# Patient Record
Sex: MALE | Race: WHITE | Employment: OTHER | URBAN - METROPOLITAN AREA
[De-identification: names, ages, dates, MRNs, and addresses within clinical notes are randomized per-mention and may not be internally consistent; named-entity substitution may affect disease eponyms.]

---

## 2017-02-14 RX ORDER — FLUOXETINE HYDROCHLORIDE 40 MG/1
CAPSULE ORAL
Qty: 90 CAPSULE | Refills: 0 | Status: SHIPPED | OUTPATIENT
Start: 2017-02-14 | End: 2017-10-31 | Stop reason: ALTCHOICE

## 2017-05-15 RX ORDER — LOSARTAN POTASSIUM AND HYDROCHLOROTHIAZIDE 12.5; 5 MG/1; MG/1
TABLET ORAL
Qty: 90 TABLET | Refills: 0 | Status: SHIPPED | OUTPATIENT
Start: 2017-05-15 | End: 2017-08-14 | Stop reason: SDUPTHER

## 2017-05-15 RX ORDER — BUPROPION HYDROCHLORIDE 300 MG/1
TABLET ORAL
Qty: 90 TABLET | Refills: 0 | Status: SHIPPED | OUTPATIENT
Start: 2017-05-15 | End: 2017-08-14 | Stop reason: SDUPTHER

## 2017-05-16 RX ORDER — FLUOXETINE HYDROCHLORIDE 40 MG/1
CAPSULE ORAL
Qty: 90 CAPSULE | Refills: 0 | Status: SHIPPED | OUTPATIENT
Start: 2017-05-16 | End: 2017-08-17 | Stop reason: SDUPTHER

## 2017-08-15 RX ORDER — LOSARTAN POTASSIUM AND HYDROCHLOROTHIAZIDE 12.5; 5 MG/1; MG/1
TABLET ORAL
Qty: 90 TABLET | Refills: 0 | Status: SHIPPED | OUTPATIENT
Start: 2017-08-15 | End: 2017-10-31 | Stop reason: SDUPTHER

## 2017-08-15 RX ORDER — BUPROPION HYDROCHLORIDE 300 MG/1
TABLET ORAL
Qty: 90 TABLET | Refills: 0 | Status: SHIPPED | OUTPATIENT
Start: 2017-08-15 | End: 2017-10-31 | Stop reason: ALTCHOICE

## 2017-08-17 RX ORDER — FLUOXETINE HYDROCHLORIDE 40 MG/1
CAPSULE ORAL
Qty: 90 CAPSULE | Refills: 0 | Status: SHIPPED | OUTPATIENT
Start: 2017-08-17 | End: 2017-10-31 | Stop reason: SDUPTHER

## 2017-10-26 ENCOUNTER — TELEPHONE (OUTPATIENT)
Dept: INTERNAL MEDICINE CLINIC | Age: 72
End: 2017-10-26

## 2017-10-26 DIAGNOSIS — Z12.5 PROSTATE CANCER SCREENING: ICD-10-CM

## 2017-10-26 DIAGNOSIS — E78.5 HYPERLIPIDEMIA, UNSPECIFIED HYPERLIPIDEMIA TYPE: ICD-10-CM

## 2017-10-26 DIAGNOSIS — I10 HYPERTENSION, UNSPECIFIED TYPE: Primary | ICD-10-CM

## 2017-10-26 NOTE — TELEPHONE ENCOUNTER
Patient called states that he has an appointment next week and would like to know if he needs blood work prior office visit.

## 2017-10-30 ENCOUNTER — TELEPHONE (OUTPATIENT)
Dept: INTERNAL MEDICINE CLINIC | Age: 72
End: 2017-10-30

## 2017-10-31 ENCOUNTER — OFFICE VISIT (OUTPATIENT)
Dept: INTERNAL MEDICINE CLINIC | Age: 72
End: 2017-10-31

## 2017-10-31 VITALS
RESPIRATION RATE: 16 BRPM | BODY MASS INDEX: 26.86 KG/M2 | SYSTOLIC BLOOD PRESSURE: 132 MMHG | HEART RATE: 80 BPM | DIASTOLIC BLOOD PRESSURE: 80 MMHG | WEIGHT: 203.6 LBS

## 2017-10-31 DIAGNOSIS — I10 ESSENTIAL HYPERTENSION: Primary | ICD-10-CM

## 2017-10-31 DIAGNOSIS — Z23 NEED FOR INFLUENZA VACCINATION: ICD-10-CM

## 2017-10-31 DIAGNOSIS — Z13.6 SCREENING FOR AAA (ABDOMINAL AORTIC ANEURYSM): ICD-10-CM

## 2017-10-31 DIAGNOSIS — F39 MOOD DISORDER (HCC): ICD-10-CM

## 2017-10-31 PROCEDURE — 1036F TOBACCO NON-USER: CPT | Performed by: INTERNAL MEDICINE

## 2017-10-31 PROCEDURE — 90662 IIV NO PRSV INCREASED AG IM: CPT | Performed by: INTERNAL MEDICINE

## 2017-10-31 PROCEDURE — G0008 ADMIN INFLUENZA VIRUS VAC: HCPCS | Performed by: INTERNAL MEDICINE

## 2017-10-31 PROCEDURE — G8482 FLU IMMUNIZE ORDER/ADMIN: HCPCS | Performed by: INTERNAL MEDICINE

## 2017-10-31 PROCEDURE — G8419 CALC BMI OUT NRM PARAM NOF/U: HCPCS | Performed by: INTERNAL MEDICINE

## 2017-10-31 PROCEDURE — 3017F COLORECTAL CA SCREEN DOC REV: CPT | Performed by: INTERNAL MEDICINE

## 2017-10-31 PROCEDURE — 99213 OFFICE O/P EST LOW 20 MIN: CPT | Performed by: INTERNAL MEDICINE

## 2017-10-31 PROCEDURE — 1123F ACP DISCUSS/DSCN MKR DOCD: CPT | Performed by: INTERNAL MEDICINE

## 2017-10-31 PROCEDURE — G8427 DOCREV CUR MEDS BY ELIG CLIN: HCPCS | Performed by: INTERNAL MEDICINE

## 2017-10-31 PROCEDURE — 4040F PNEUMOC VAC/ADMIN/RCVD: CPT | Performed by: INTERNAL MEDICINE

## 2017-10-31 RX ORDER — FLUOXETINE HYDROCHLORIDE 20 MG/1
20 CAPSULE ORAL DAILY
Qty: 50 CAPSULE | Refills: 3 | Status: SHIPPED | OUTPATIENT
Start: 2017-10-31 | End: 2017-10-31 | Stop reason: SDUPTHER

## 2017-10-31 RX ORDER — FLUOXETINE HYDROCHLORIDE 20 MG/1
20 CAPSULE ORAL DAILY
Qty: 90 CAPSULE | Refills: 3 | Status: SHIPPED | OUTPATIENT
Start: 2017-10-31 | End: 2018-03-06 | Stop reason: ALTCHOICE

## 2017-10-31 RX ORDER — DULOXETIN HYDROCHLORIDE 30 MG/1
30 CAPSULE, DELAYED RELEASE ORAL DAILY
Qty: 10 CAPSULE | Refills: 1 | Status: SHIPPED | OUTPATIENT
Start: 2017-10-31 | End: 2017-10-31 | Stop reason: SDUPTHER

## 2017-10-31 RX ORDER — DULOXETIN HYDROCHLORIDE 30 MG/1
30 CAPSULE, DELAYED RELEASE ORAL DAILY
Qty: 90 CAPSULE | Refills: 1 | Status: SHIPPED | OUTPATIENT
Start: 2017-10-31 | End: 2018-03-06 | Stop reason: ALTCHOICE

## 2017-10-31 RX ORDER — LOSARTAN POTASSIUM AND HYDROCHLOROTHIAZIDE 12.5; 5 MG/1; MG/1
TABLET ORAL
Qty: 90 TABLET | Refills: 1 | Status: SHIPPED | OUTPATIENT
Start: 2017-10-31 | End: 2018-06-11 | Stop reason: SDUPTHER

## 2017-10-31 RX ORDER — DULOXETIN HYDROCHLORIDE 60 MG/1
60 CAPSULE, DELAYED RELEASE ORAL DAILY
Qty: 90 CAPSULE | Refills: 1 | Status: SHIPPED | OUTPATIENT
Start: 2017-10-31 | End: 2018-03-06 | Stop reason: ALTCHOICE

## 2017-10-31 NOTE — PROGRESS NOTES
Sleep 90 tablet 0     No current facility-administered medications for this visit. Past Medical History:   Diagnosis Date    Adenomatous polyp of colon 1/2001    Mildly Dysplastic Tubular Adenoma    Basal cell carcinoma of cheek 12/2012    left temple; Moh's; [facility]    Basal cell carcinoma of skin of trunk 11/2010    front of neck; Kay Garduno & Juan Snow    Hyperlipidemia 2015    lipids are OK; but [place] 12%    Hypertension 10/2014    Melanoma in situ of scalp ([address] 12/2012    Vertex scalp; Van level III    Mood disorder ([facility]     Irritabilitiy    Ocular histoplasmosis syndrome of both eyes 2000    left eye greater than right eye-S/P laser treatment in [email or web address]; Avastin ; Dr Danna Grant Sleep disorder     chronic        Social History   Substance Use Topics    Smoking status: Former Smoker     Packs/day: 1.50     Years: 11.00     Start date: 7/15/1964     Quit date: 1/5/1975    Smokeless tobacco: Never Used    Alcohol use Yes      Comment: wine daily        ROS: The patient has had no headache, sore throat, fever or chills, cough, dyspnea, chest pain, nausea, vomiting or diarrhea, or edema. Objective:      /80   Pulse 80   Resp 16   Wt 203 lb 9.6 oz (92.4 kg)   BMI 26.86 kg/m²    General: in no apparent distress   The patient's neck is free of nodes. Lungs are clear. Heart is normal in rate and regular in rhythm. Legs are free of edema. No rash or erythema. labs from Oct on chart and rev'd    Assessment / Plan:      1. Essential hypertension    2.  Mood disorder ([facility] [address] [facility]    3. Screening for AAA (abdominal aortic aneurysm)            Plan   Get US of AAA       Wellbutrin 150 daily x 30 days then every other day x 10 doses    Cut prozac to 20 mg daily x 7 days, then stop    After stopping prozac completely begin cymbalta 30 mg /d x 10 d, then increase to 60 mg /d    He is leaving soon for winter in [place]  Will return in Spring

## 2017-10-31 NOTE — PATIENT INSTRUCTIONS
Wellbutrin 150 daily x 30 days then every other day x 10 doses    Cut prozac to 20 mg daily x 7 days, then stop    After stopping prozac completely begin cymbalta 30 mg /d x 10 d, then increase to 60 mg /d

## 2017-11-03 DIAGNOSIS — Z13.6 SCREENING FOR AAA (ABDOMINAL AORTIC ANEURYSM): ICD-10-CM

## 2017-12-04 RX ORDER — ZOLPIDEM TARTRATE 10 MG/1
10 TABLET ORAL NIGHTLY PRN
Qty: 90 TABLET | Refills: 0 | Status: SHIPPED | OUTPATIENT
Start: 2017-12-04 | End: 2018-09-12 | Stop reason: SDUPTHER

## 2017-12-06 DIAGNOSIS — Z12.5 PROSTATE CANCER SCREENING: ICD-10-CM

## 2018-03-06 ENCOUNTER — TELEPHONE (OUTPATIENT)
Dept: INTERNAL MEDICINE CLINIC | Age: 73
End: 2018-03-06

## 2018-03-06 RX ORDER — CITALOPRAM 20 MG/1
20 TABLET ORAL DAILY
Qty: 30 TABLET | Refills: 2 | Status: SHIPPED | OUTPATIENT
Start: 2018-03-06 | End: 2018-12-05

## 2018-03-06 RX ORDER — BUPROPION HYDROCHLORIDE 150 MG/1
150 TABLET ORAL EVERY MORNING
COMMUNITY
End: 2018-03-06 | Stop reason: SDUPTHER

## 2018-03-06 RX ORDER — CITALOPRAM 20 MG/1
20 TABLET ORAL DAILY
COMMUNITY
End: 2018-03-06 | Stop reason: SDUPTHER

## 2018-03-06 RX ORDER — BUPROPION HYDROCHLORIDE 150 MG/1
150 TABLET ORAL EVERY MORNING
Qty: 30 TABLET | Refills: 2 | Status: SHIPPED | OUTPATIENT
Start: 2018-03-06 | End: 2018-06-04 | Stop reason: SDUPTHER

## 2018-03-06 NOTE — TELEPHONE ENCOUNTER
Encounter Messages     Read Composed From To Subject   Y 3/6/2018  3:42 PM Safia Quiroz RN RE:Medication refills   Y 3/6/2018  3:41 PM Safia Quiroz, RN RE:Medication refills   Y 3/6/2018  3:37 PM Safia Quiroz, RN RE:Medication refills   Y 3/6/2018  3:31 PM SANJUANITA Leal Medication refills      Medication refills     Lissa Gomez 8 minutes ago (3:42 PM)         Part 3     I dont really want the go back on the [place] combination but hopefully you may have another suggestion. Best regards,   Alyse Coppola   [phone]   Email: [email or web address]     [id number]          Lissa Sissy   Jason 9 minutes ago (3:41 PM)               Part 2   2 There have been so many different looking capsules that it was not noticed by me.  Looked about the same.  I should have checked but didnt. I took the Cloretta [place] 150 for a little over a month until I caught the error.  I then stepped up the Cloretta Foxhome  2 capsules a day for 5 days and then restarted the DULOXETINE 60 MG   I have been back on it for about two weeks but just dont like the results.  When I was on it through the first 90 days, I saw some positive results.  My memory was much improved.  I think I took that as an improvement and didnt give enough attention to the other issues.  As I look back, I think some of the issues existing now were present then. I          Lissa Robertsonsssofy Gomez 12 minutes ago (3:37 PM)         Part 1     Valdo Garnica,   This is Zonia Singer am in [place] until the first week in April. I am sending this message to give detail to my situation to shorten and take less time when you respond. I am having some difficulty with the new anti-depressant.  I am experiencing swings and character change issues which are very concerning.    After taking it for the first prescription period, when I refilled it, either [facility] or I made a mistake and the refilled 50 of the 150 mg of WELLBUTRIN rather than the current dosage of DULOXETINE 60 MG.  I did not like the way I felt during that month.    There have been so many different looking capsules that it was not noticed by me

## 2018-06-01 ENCOUNTER — TELEPHONE (OUTPATIENT)
Dept: INTERNAL MEDICINE CLINIC | Age: 73
End: 2018-06-01

## 2018-06-05 RX ORDER — DULOXETIN HYDROCHLORIDE 30 MG/1
30 CAPSULE, DELAYED RELEASE ORAL DAILY
Qty: 90 CAPSULE | Refills: 1 | Status: SHIPPED | OUTPATIENT
Start: 2018-06-05 | End: 2018-12-05

## 2018-06-05 RX ORDER — BUPROPION HYDROCHLORIDE 150 MG/1
150 TABLET ORAL EVERY MORNING
Qty: 30 TABLET | Refills: 0 | Status: SHIPPED | OUTPATIENT
Start: 2018-06-05 | End: 2018-06-12 | Stop reason: SDUPTHER

## 2018-06-11 RX ORDER — LOSARTAN POTASSIUM AND HYDROCHLOROTHIAZIDE 12.5; 5 MG/1; MG/1
TABLET ORAL
Qty: 90 TABLET | Refills: 1 | Status: SHIPPED | OUTPATIENT
Start: 2018-06-11 | End: 2018-12-05 | Stop reason: SDUPTHER

## 2018-06-12 ENCOUNTER — TELEPHONE (OUTPATIENT)
Dept: INTERNAL MEDICINE CLINIC | Age: 73
End: 2018-06-12

## 2018-06-13 RX ORDER — BUPROPION HYDROCHLORIDE 150 MG/1
150 TABLET ORAL EVERY MORNING
Qty: 90 TABLET | Refills: 0 | Status: SHIPPED | OUTPATIENT
Start: 2018-06-13 | End: 2018-09-14 | Stop reason: SDUPTHER

## 2018-07-09 ENCOUNTER — TELEPHONE (OUTPATIENT)
Dept: INTERNAL MEDICINE CLINIC | Age: 73
End: 2018-07-09

## 2018-09-12 ENCOUNTER — TELEPHONE (OUTPATIENT)
Dept: INTERNAL MEDICINE CLINIC | Age: 73
End: 2018-09-12

## 2018-09-12 DIAGNOSIS — F51.01 PRIMARY INSOMNIA: Primary | ICD-10-CM

## 2018-09-12 RX ORDER — ZOLPIDEM TARTRATE 10 MG/1
TABLET ORAL
Qty: 90 TABLET | Refills: 0 | Status: SHIPPED | OUTPATIENT
Start: 2018-09-12 | End: 2018-12-05 | Stop reason: SDUPTHER

## 2018-09-14 RX ORDER — BUPROPION HYDROCHLORIDE 150 MG/1
150 TABLET ORAL EVERY MORNING
Qty: 90 TABLET | Refills: 0 | Status: SHIPPED | OUTPATIENT
Start: 2018-09-14 | End: 2018-12-05 | Stop reason: SDUPTHER

## 2018-11-29 ENCOUNTER — TELEPHONE (OUTPATIENT)
Dept: INTERNAL MEDICINE CLINIC | Age: 73
End: 2018-11-29

## 2018-11-29 DIAGNOSIS — I10 HYPERTENSION, UNSPECIFIED TYPE: Primary | ICD-10-CM

## 2018-11-29 DIAGNOSIS — E78.5 HYPERLIPIDEMIA, UNSPECIFIED HYPERLIPIDEMIA TYPE: ICD-10-CM

## 2018-11-29 DIAGNOSIS — Z12.5 PROSTATE CANCER SCREENING: ICD-10-CM

## 2018-11-29 NOTE — TELEPHONE ENCOUNTER
Yes:    CBC with diff  CMP  Lipid profile  PSA screening    DX htn , hyperlipidema , prostate cancer screening

## 2018-12-04 ENCOUNTER — HOSPITAL ENCOUNTER (OUTPATIENT)
Age: 73
Discharge: HOME | End: 2018-12-04
Payer: MEDICARE

## 2018-12-04 LAB
ALBUMIN SERPL-MCNC: 4.1 GM/DL (ref 3.4–5)
ALP BLD-CCNC: 59 IU/L (ref 40–128)
ALT SERPL-CCNC: 15 U/L (ref 10–40)
ANION GAP SERPL CALCULATED.3IONS-SCNC: 8 MMOL/L (ref 4–16)
AST SERPL-CCNC: 21 IU/L (ref 15–37)
BASOPHILS ABSOLUTE: 0.1 K/CU MM
BASOPHILS RELATIVE PERCENT: 1.1 % (ref 0–1)
BILIRUB SERPL-MCNC: 0.5 MG/DL (ref 0–1)
BUN BLDV-MCNC: 18 MG/DL (ref 6–23)
CALCIUM SERPL-MCNC: 9.2 MG/DL (ref 8.3–10.6)
CHLORIDE BLD-SCNC: 97 MMOL/L (ref 99–110)
CHOLESTEROL: 144 MG/DL
CO2: 28 MMOL/L (ref 21–32)
CREAT SERPL-MCNC: 1 MG/DL (ref 0.9–1.3)
DIFFERENTIAL TYPE: ABNORMAL
EOSINOPHILS ABSOLUTE: 0.3 K/CU MM
EOSINOPHILS RELATIVE PERCENT: 5.6 % (ref 0–3)
GFR AFRICAN AMERICAN: >60 ML/MIN/1.73M2
GFR NON-AFRICAN AMERICAN: >60 ML/MIN/1.73M2
GLUCOSE BLD-MCNC: 105 MG/DL (ref 70–99)
HCT VFR BLD CALC: 45.6 % (ref 42–52)
HDLC SERPL-MCNC: 73 MG/DL
HEMOGLOBIN: 14.6 GM/DL (ref 13.5–18)
IMMATURE NEUTROPHIL %: 0.4 % (ref 0–0.43)
LDL CHOLESTEROL DIRECT: 74 MG/DL
LYMPHOCYTES ABSOLUTE: 1.2 K/CU MM
LYMPHOCYTES RELATIVE PERCENT: 22.2 % (ref 24–44)
MCH RBC QN AUTO: 31.7 PG (ref 27–31)
MCHC RBC AUTO-ENTMCNC: 32 % (ref 32–36)
MCV RBC AUTO: 99.1 FL (ref 78–100)
MONOCYTES ABSOLUTE: 0.9 K/CU MM
MONOCYTES RELATIVE PERCENT: 16.8 % (ref 0–4)
NUCLEATED RBC %: 0 %
PDW BLD-RTO: 12.3 % (ref 11.7–14.9)
PLATELET # BLD: 183 K/CU MM (ref 140–440)
PMV BLD AUTO: 11.3 FL (ref 7.5–11.1)
POTASSIUM SERPL-SCNC: 5 MMOL/L (ref 3.5–5.1)
PROSTATE SPECIFIC ANTIGEN: 1.71 NG/ML (ref 0–4)
RBC # BLD: 4.6 M/CU MM (ref 4.6–6.2)
SEGMENTED NEUTROPHILS ABSOLUTE COUNT: 3 K/CU MM
SEGMENTED NEUTROPHILS RELATIVE PERCENT: 53.9 % (ref 36–66)
SODIUM BLD-SCNC: 133 MMOL/L (ref 135–145)
TOTAL IMMATURE NEUTOROPHIL: 0.02 K/CU MM
TOTAL NUCLEATED RBC: 0 K/CU MM
TOTAL PROTEIN: 6.4 GM/DL (ref 6.4–8.2)
TRIGL SERPL-MCNC: 34 MG/DL
WBC # BLD: 5.6 K/CU MM (ref 4–10.5)

## 2018-12-04 PROCEDURE — 80053 COMPREHEN METABOLIC PANEL: CPT

## 2018-12-04 PROCEDURE — 36415 COLL VENOUS BLD VENIPUNCTURE: CPT

## 2018-12-04 PROCEDURE — 80061 LIPID PANEL: CPT

## 2018-12-04 PROCEDURE — 85025 COMPLETE CBC W/AUTO DIFF WBC: CPT

## 2018-12-04 PROCEDURE — 83721 ASSAY OF BLOOD LIPOPROTEIN: CPT

## 2018-12-04 PROCEDURE — G0103 PSA SCREENING: HCPCS

## 2018-12-05 ENCOUNTER — ANESTHESIA EVENT (OUTPATIENT)
Dept: OPERATING ROOM | Age: 73
End: 2018-12-05
Payer: MEDICARE

## 2018-12-05 ENCOUNTER — OFFICE VISIT (OUTPATIENT)
Dept: INTERNAL MEDICINE CLINIC | Age: 73
End: 2018-12-05
Payer: MEDICARE

## 2018-12-05 VITALS
OXYGEN SATURATION: 98 % | SYSTOLIC BLOOD PRESSURE: 128 MMHG | WEIGHT: 206 LBS | RESPIRATION RATE: 16 BRPM | HEIGHT: 73 IN | HEART RATE: 93 BPM | BODY MASS INDEX: 27.3 KG/M2 | DIASTOLIC BLOOD PRESSURE: 80 MMHG

## 2018-12-05 DIAGNOSIS — H32 OCULAR HISTOPLASMOSIS SYNDROME OF BOTH EYES: ICD-10-CM

## 2018-12-05 DIAGNOSIS — I10 HYPERTENSION, UNSPECIFIED TYPE: Primary | ICD-10-CM

## 2018-12-05 DIAGNOSIS — F39 MOOD DISORDER (HCC): ICD-10-CM

## 2018-12-05 DIAGNOSIS — B39.9 OCULAR HISTOPLASMOSIS SYNDROME OF BOTH EYES: ICD-10-CM

## 2018-12-05 DIAGNOSIS — F51.01 PRIMARY INSOMNIA: ICD-10-CM

## 2018-12-05 PROCEDURE — 1101F PT FALLS ASSESS-DOCD LE1/YR: CPT | Performed by: INTERNAL MEDICINE

## 2018-12-05 PROCEDURE — 4040F PNEUMOC VAC/ADMIN/RCVD: CPT | Performed by: INTERNAL MEDICINE

## 2018-12-05 PROCEDURE — G8510 SCR DEP NEG, NO PLAN REQD: HCPCS | Performed by: INTERNAL MEDICINE

## 2018-12-05 PROCEDURE — 3017F COLORECTAL CA SCREEN DOC REV: CPT | Performed by: INTERNAL MEDICINE

## 2018-12-05 PROCEDURE — G8419 CALC BMI OUT NRM PARAM NOF/U: HCPCS | Performed by: INTERNAL MEDICINE

## 2018-12-05 PROCEDURE — 1123F ACP DISCUSS/DSCN MKR DOCD: CPT | Performed by: INTERNAL MEDICINE

## 2018-12-05 PROCEDURE — G8427 DOCREV CUR MEDS BY ELIG CLIN: HCPCS | Performed by: INTERNAL MEDICINE

## 2018-12-05 PROCEDURE — G8482 FLU IMMUNIZE ORDER/ADMIN: HCPCS | Performed by: INTERNAL MEDICINE

## 2018-12-05 PROCEDURE — 99214 OFFICE O/P EST MOD 30 MIN: CPT | Performed by: INTERNAL MEDICINE

## 2018-12-05 PROCEDURE — 1036F TOBACCO NON-USER: CPT | Performed by: INTERNAL MEDICINE

## 2018-12-05 RX ORDER — DULOXETIN HYDROCHLORIDE 30 MG/1
30 CAPSULE, DELAYED RELEASE ORAL DAILY
COMMUNITY
End: 2018-12-05 | Stop reason: SDUPTHER

## 2018-12-05 RX ORDER — DULOXETIN HYDROCHLORIDE 30 MG/1
30 CAPSULE, DELAYED RELEASE ORAL DAILY
Qty: 90 CAPSULE | Refills: 1 | Status: SHIPPED | OUTPATIENT
Start: 2018-12-05 | End: 2019-06-18 | Stop reason: SDUPTHER

## 2018-12-05 RX ORDER — BUPROPION HYDROCHLORIDE 300 MG/1
300 TABLET ORAL EVERY MORNING
Qty: 90 TABLET | Refills: 1 | Status: SHIPPED | OUTPATIENT
Start: 2018-12-05 | End: 2019-09-12 | Stop reason: SDUPTHER

## 2018-12-05 RX ORDER — ZOLPIDEM TARTRATE 10 MG/1
10 TABLET ORAL NIGHTLY PRN
Qty: 90 TABLET | Refills: 0 | Status: SHIPPED | OUTPATIENT
Start: 2018-12-05 | End: 2018-12-23 | Stop reason: SDUPTHER

## 2018-12-05 RX ORDER — BUPROPION HYDROCHLORIDE 300 MG/1
300 TABLET ORAL EVERY MORNING
COMMUNITY
End: 2018-12-05 | Stop reason: SDUPTHER

## 2018-12-05 RX ORDER — LOSARTAN POTASSIUM AND HYDROCHLOROTHIAZIDE 12.5; 5 MG/1; MG/1
TABLET ORAL
Qty: 90 TABLET | Refills: 1 | Status: SHIPPED | OUTPATIENT
Start: 2018-12-05 | End: 2019-06-18 | Stop reason: SDUPTHER

## 2018-12-05 ASSESSMENT — PATIENT HEALTH QUESTIONNAIRE - PHQ9
1. LITTLE INTEREST OR PLEASURE IN DOING THINGS: 0
SUM OF ALL RESPONSES TO PHQ9 QUESTIONS 1 & 2: 0
2. FEELING DOWN, DEPRESSED OR HOPELESS: 0
SUM OF ALL RESPONSES TO PHQ QUESTIONS 1-9: 0
SUM OF ALL RESPONSES TO PHQ QUESTIONS 1-9: 0

## 2018-12-05 NOTE — ANESTHESIA PRE PROCEDURE
Department of Anesthesiology  Preprocedure Note       Name:  Cathie Vasquez   Age:  75 y.o.  :  1945                                          MRN:  [id number]         Date:  2018      Surgeon: Jose L Ching):  Girma Gonzalez MD    Procedure: COLONOSCOPY DIAGNOSTIC OR SCREENING (N/A )    Medications prior to admission:   Prior to Admission medications    Medication Sig Start Date End Date Taking? Authorizing Provider   buPROPion (WELLBUTRIN XL) 150 MG extended release tablet Take 1 tablet by mouth every morning 18   Lashay De La Torre MD   zolpidem (AMBIEN) 10 MG tablet TAKE 1 TABLET BY MOUTH EVERY DAY AT BEDTIME FOR SLEEP 18  Lashay De La Torre MD   linaclotide [facility]) 145 MCG capsule Take 1 capsule by mouth every morning (before breakfast) 18   Lashay De La Torre MD   losartan-hydrochlorothiazide [facility]) 50-12.5 MG per tablet TAKE 1 TABLET BY MOUTH EVERY DAY 18   Lashay De La Torre MD   DULoxetine (CYMBALTA) 30 MG extended release capsule TAKE 1 CAPSULE BY MOUTH DAILY 18   Lashay De La Torre MD   citalopram (CELEXA) 20 MG tablet Take 1 tablet by mouth daily 3/6/18   Lashay De La Torre MD   tadalafil (CIALIS) 20 MG tablet Take 1 tablet by mouth as needed for Erectile Dysfunction 10/18/16   Lashay De La Torre MD       Current medications:    No current facility-administered medications for this encounter.       Current Outpatient Prescriptions   Medication Sig Dispense Refill    buPROPion (WELLBUTRIN XL) 150 MG extended release tablet Take 1 tablet by mouth every morning 90 tablet 0    zolpidem (AMBIEN) 10 MG tablet TAKE 1 TABLET BY MOUTH EVERY DAY AT BEDTIME FOR SLEEP 90 tablet 0    linaclotide (LINZESS) 145 MCG capsule Take 1 capsule by mouth every morning (before breakfast) 30 capsule 5    losartan-hydrochlorothiazide (HYZAAR) 50-12.5 MG per tablet TAKE 1 TABLET BY MOUTH EVERY DAY 90 tablet 1    DULoxetine (CYMBALTA) 30 MG extended release capsule TAKE 1 CAPSULE BY MOUTH DAILY 90

## 2018-12-06 ENCOUNTER — TELEPHONE (OUTPATIENT)
Dept: INTERNAL MEDICINE CLINIC | Age: 73
End: 2018-12-06

## 2018-12-06 RX ORDER — SILDENAFIL CITRATE 20 MG/1
20 TABLET ORAL PRN
Qty: 10 TABLET | Refills: 5 | Status: ON HOLD | OUTPATIENT
Start: 2018-12-06 | End: 2018-12-07 | Stop reason: ALTCHOICE

## 2018-12-06 RX ORDER — SILDENAFIL CITRATE 20 MG/1
20 TABLET ORAL PRN
COMMUNITY
End: 2018-12-06 | Stop reason: SDUPTHER

## 2018-12-07 ENCOUNTER — HOSPITAL ENCOUNTER (OUTPATIENT)
Age: 73
Setting detail: OUTPATIENT SURGERY
Discharge: HOME | End: 2018-12-07
Attending: SPECIALIST | Admitting: SPECIALIST
Payer: MEDICARE

## 2018-12-07 ENCOUNTER — ANESTHESIA (OUTPATIENT)
Dept: OPERATING ROOM | Age: 73
End: 2018-12-07
Payer: MEDICARE

## 2018-12-07 VITALS
SYSTOLIC BLOOD PRESSURE: 121 MMHG | TEMPERATURE: 97 F | BODY MASS INDEX: 27.3 KG/M2 | RESPIRATION RATE: 16 BRPM | DIASTOLIC BLOOD PRESSURE: 63 MMHG | WEIGHT: 206 LBS | OXYGEN SATURATION: 97 % | HEIGHT: 73 IN | HEART RATE: 51 BPM

## 2018-12-07 VITALS — SYSTOLIC BLOOD PRESSURE: 93 MMHG | OXYGEN SATURATION: 99 % | DIASTOLIC BLOOD PRESSURE: 54 MMHG

## 2018-12-07 PROCEDURE — 3609010600 HC COLONOSCOPY POLYPECTOMY SNARE/COLD BIOPSY: Performed by: SPECIALIST

## 2018-12-07 PROCEDURE — 3700000001 HC ADD 15 MINUTES (ANESTHESIA): Performed by: SPECIALIST

## 2018-12-07 PROCEDURE — 3700000000 HC ANESTHESIA ATTENDED CARE: Performed by: SPECIALIST

## 2018-12-07 PROCEDURE — 7100000010 HC PHASE II RECOVERY - FIRST 15 MIN: Performed by: SPECIALIST

## 2018-12-07 PROCEDURE — 88305 TISSUE EXAM BY PATHOLOGIST: CPT

## 2018-12-07 PROCEDURE — 2580000003 HC RX 258: Performed by: SPECIALIST

## 2018-12-07 PROCEDURE — 2500000003 HC RX 250 WO HCPCS: Performed by: NURSE ANESTHETIST, CERTIFIED REGISTERED

## 2018-12-07 PROCEDURE — 7100000011 HC PHASE II RECOVERY - ADDTL 15 MIN: Performed by: SPECIALIST

## 2018-12-07 PROCEDURE — 6360000002 HC RX W HCPCS: Performed by: NURSE ANESTHETIST, CERTIFIED REGISTERED

## 2018-12-07 PROCEDURE — 2709999900 HC NON-CHARGEABLE SUPPLY: Performed by: SPECIALIST

## 2018-12-07 RX ORDER — LIDOCAINE HYDROCHLORIDE 20 MG/ML
INJECTION, SOLUTION INFILTRATION; PERINEURAL PRN
Status: DISCONTINUED | OUTPATIENT
Start: 2018-12-07 | End: 2018-12-07 | Stop reason: SDUPTHER

## 2018-12-07 RX ORDER — PROPOFOL 10 MG/ML
INJECTION, EMULSION INTRAVENOUS PRN
Status: DISCONTINUED | OUTPATIENT
Start: 2018-12-07 | End: 2018-12-07 | Stop reason: SDUPTHER

## 2018-12-07 RX ORDER — SODIUM CHLORIDE, SODIUM LACTATE, POTASSIUM CHLORIDE, CALCIUM CHLORIDE 600; 310; 30; 20 MG/100ML; MG/100ML; MG/100ML; MG/100ML
INJECTION, SOLUTION INTRAVENOUS CONTINUOUS
Status: DISCONTINUED | OUTPATIENT
Start: 2018-12-07 | End: 2018-12-07 | Stop reason: HOSPADM

## 2018-12-07 RX ADMIN — PROPOFOL 30 MG: 10 INJECTION, EMULSION INTRAVENOUS at 12:17

## 2018-12-07 RX ADMIN — LIDOCAINE HYDROCHLORIDE 100 MG: 20 INJECTION, SOLUTION INFILTRATION; PERINEURAL at 12:11

## 2018-12-07 RX ADMIN — PROPOFOL 50 MG: 10 INJECTION, EMULSION INTRAVENOUS at 12:11

## 2018-12-07 RX ADMIN — PROPOFOL 30 MG: 10 INJECTION, EMULSION INTRAVENOUS at 12:34

## 2018-12-07 RX ADMIN — PROPOFOL 30 MG: 10 INJECTION, EMULSION INTRAVENOUS at 12:38

## 2018-12-07 RX ADMIN — PROPOFOL 30 MG: 10 INJECTION, EMULSION INTRAVENOUS at 12:20

## 2018-12-07 RX ADMIN — PROPOFOL 20 MG: 10 INJECTION, EMULSION INTRAVENOUS at 12:28

## 2018-12-07 RX ADMIN — PROPOFOL 20 MG: 10 INJECTION, EMULSION INTRAVENOUS at 12:46

## 2018-12-07 RX ADMIN — SODIUM CHLORIDE, POTASSIUM CHLORIDE, SODIUM LACTATE AND CALCIUM CHLORIDE: 600; 310; 30; 20 INJECTION, SOLUTION INTRAVENOUS at 10:57

## 2018-12-07 RX ADMIN — PROPOFOL 30 MG: 10 INJECTION, EMULSION INTRAVENOUS at 12:23

## 2018-12-07 RX ADMIN — PROPOFOL 50 MG: 10 INJECTION, EMULSION INTRAVENOUS at 12:12

## 2018-12-07 RX ADMIN — PROPOFOL 30 MG: 10 INJECTION, EMULSION INTRAVENOUS at 12:15

## 2018-12-07 RX ADMIN — PROPOFOL 30 MG: 10 INJECTION, EMULSION INTRAVENOUS at 12:43

## 2018-12-07 RX ADMIN — PROPOFOL 30 MG: 10 INJECTION, EMULSION INTRAVENOUS at 12:40

## 2018-12-07 RX ADMIN — PROPOFOL 20 MG: 10 INJECTION, EMULSION INTRAVENOUS at 12:31

## 2018-12-07 ASSESSMENT — PAIN SCALES - GENERAL
PAINLEVEL_OUTOF10: 0
PAINLEVEL_OUTOF10: 0

## 2018-12-07 ASSESSMENT — PAIN - FUNCTIONAL ASSESSMENT: PAIN_FUNCTIONAL_ASSESSMENT: 0-10

## 2018-12-23 DIAGNOSIS — F51.01 PRIMARY INSOMNIA: ICD-10-CM

## 2018-12-26 RX ORDER — ZOLPIDEM TARTRATE 10 MG/1
TABLET ORAL
Qty: 90 TABLET | Refills: 0 | Status: SHIPPED | OUTPATIENT
Start: 2018-12-26 | End: 2019-03-26

## 2019-03-01 ENCOUNTER — PATIENT MESSAGE (OUTPATIENT)
Dept: INTERNAL MEDICINE CLINIC | Age: 74
End: 2019-03-01

## 2019-03-01 RX ORDER — AZITHROMYCIN 250 MG/1
250 TABLET, FILM COATED ORAL SEE ADMIN INSTRUCTIONS
Qty: 6 TABLET | Refills: 0 | Status: SHIPPED | OUTPATIENT
Start: 2019-03-01 | End: 2019-03-06

## 2019-06-18 RX ORDER — LOSARTAN POTASSIUM AND HYDROCHLOROTHIAZIDE 12.5; 5 MG/1; MG/1
TABLET ORAL
Qty: 90 TABLET | Refills: 0 | Status: SHIPPED | OUTPATIENT
Start: 2019-06-18 | End: 2019-09-12 | Stop reason: SDUPTHER

## 2019-06-18 RX ORDER — DULOXETIN HYDROCHLORIDE 30 MG/1
CAPSULE, DELAYED RELEASE ORAL
Qty: 90 CAPSULE | Refills: 0 | Status: SHIPPED | OUTPATIENT
Start: 2019-06-18 | End: 2019-09-12 | Stop reason: SDUPTHER

## 2019-06-20 RX ORDER — BUPROPION HYDROCHLORIDE 300 MG/1
TABLET ORAL
Qty: 90 TABLET | Refills: 0 | Status: SHIPPED | OUTPATIENT
Start: 2019-06-20 | End: 2019-10-07

## 2019-09-15 RX ORDER — BUPROPION HYDROCHLORIDE 300 MG/1
300 TABLET ORAL EVERY MORNING
Qty: 90 TABLET | Refills: 1 | Status: SHIPPED | OUTPATIENT
Start: 2019-09-15 | End: 2019-12-14 | Stop reason: SDUPTHER

## 2019-09-15 RX ORDER — LOSARTAN POTASSIUM AND HYDROCHLOROTHIAZIDE 12.5; 5 MG/1; MG/1
TABLET ORAL
Qty: 90 TABLET | Refills: 0 | Status: SHIPPED | OUTPATIENT
Start: 2019-09-15 | End: 2019-12-13 | Stop reason: SDUPTHER

## 2019-09-15 RX ORDER — DULOXETIN HYDROCHLORIDE 30 MG/1
CAPSULE, DELAYED RELEASE ORAL
Qty: 90 CAPSULE | Refills: 0 | Status: SHIPPED | OUTPATIENT
Start: 2019-09-15 | End: 2019-10-07

## 2019-09-15 RX ORDER — DULOXETIN HYDROCHLORIDE 30 MG/1
30 CAPSULE, DELAYED RELEASE ORAL DAILY
Qty: 90 CAPSULE | Refills: 0 | Status: SHIPPED | OUTPATIENT
Start: 2019-09-15 | End: 2020-06-01

## 2019-10-07 ENCOUNTER — OFFICE VISIT (OUTPATIENT)
Dept: INTERNAL MEDICINE CLINIC | Age: 74
End: 2019-10-07
Payer: MEDICARE

## 2019-10-07 VITALS
WEIGHT: 206 LBS | HEART RATE: 76 BPM | DIASTOLIC BLOOD PRESSURE: 74 MMHG | SYSTOLIC BLOOD PRESSURE: 132 MMHG | OXYGEN SATURATION: 95 % | BODY MASS INDEX: 27.18 KG/M2

## 2019-10-07 DIAGNOSIS — I10 ESSENTIAL HYPERTENSION: Primary | ICD-10-CM

## 2019-10-07 DIAGNOSIS — F39 MOOD DISORDER (HCC): ICD-10-CM

## 2019-10-07 DIAGNOSIS — E78.2 MIXED HYPERLIPIDEMIA: ICD-10-CM

## 2019-10-07 DIAGNOSIS — F48.9 MOOD PROBLEM: ICD-10-CM

## 2019-10-07 DIAGNOSIS — Z12.5 PROSTATE CANCER SCREENING: ICD-10-CM

## 2019-10-07 PROCEDURE — G8427 DOCREV CUR MEDS BY ELIG CLIN: HCPCS | Performed by: INTERNAL MEDICINE

## 2019-10-07 PROCEDURE — 99213 OFFICE O/P EST LOW 20 MIN: CPT | Performed by: INTERNAL MEDICINE

## 2019-10-07 PROCEDURE — 1036F TOBACCO NON-USER: CPT | Performed by: INTERNAL MEDICINE

## 2019-10-07 PROCEDURE — G8419 CALC BMI OUT NRM PARAM NOF/U: HCPCS | Performed by: INTERNAL MEDICINE

## 2019-10-07 PROCEDURE — 1123F ACP DISCUSS/DSCN MKR DOCD: CPT | Performed by: INTERNAL MEDICINE

## 2019-10-07 PROCEDURE — 3017F COLORECTAL CA SCREEN DOC REV: CPT | Performed by: INTERNAL MEDICINE

## 2019-10-07 PROCEDURE — G8484 FLU IMMUNIZE NO ADMIN: HCPCS | Performed by: INTERNAL MEDICINE

## 2019-10-07 PROCEDURE — 4040F PNEUMOC VAC/ADMIN/RCVD: CPT | Performed by: INTERNAL MEDICINE

## 2019-10-07 ASSESSMENT — PATIENT HEALTH QUESTIONNAIRE - PHQ9
SUM OF ALL RESPONSES TO PHQ9 QUESTIONS 1 & 2: 0
2. FEELING DOWN, DEPRESSED OR HOPELESS: 0
1. LITTLE INTEREST OR PLEASURE IN DOING THINGS: 0
SUM OF ALL RESPONSES TO PHQ QUESTIONS 1-9: 0
SUM OF ALL RESPONSES TO PHQ QUESTIONS 1-9: 0

## 2019-12-13 RX ORDER — LOSARTAN POTASSIUM AND HYDROCHLOROTHIAZIDE 12.5; 5 MG/1; MG/1
TABLET ORAL
Qty: 90 TABLET | Refills: 0 | Status: SHIPPED | OUTPATIENT
Start: 2019-12-13 | End: 2020-03-16

## 2019-12-16 RX ORDER — BUPROPION HYDROCHLORIDE 300 MG/1
300 TABLET ORAL EVERY MORNING
Qty: 90 TABLET | Refills: 0 | Status: SHIPPED | OUTPATIENT
Start: 2019-12-16 | End: 2020-06-01

## 2019-12-16 RX ORDER — DULOXETIN HYDROCHLORIDE 30 MG/1
CAPSULE, DELAYED RELEASE ORAL
Qty: 90 CAPSULE | Refills: 0 | Status: SHIPPED | OUTPATIENT
Start: 2019-12-16 | End: 2020-06-01

## 2019-12-17 ENCOUNTER — HOSPITAL ENCOUNTER (OUTPATIENT)
Age: 74
Discharge: HOME | End: 2019-12-17
Payer: MEDICARE

## 2019-12-17 LAB
ALBUMIN SERPL-MCNC: 4.4 GM/DL (ref 3.4–5)
ALP BLD-CCNC: 61 IU/L (ref 40–128)
ALT SERPL-CCNC: 13 U/L (ref 10–40)
ANION GAP SERPL CALCULATED.3IONS-SCNC: 10 MMOL/L (ref 4–16)
AST SERPL-CCNC: 21 IU/L (ref 15–37)
BACTERIA: ABNORMAL /HPF
BASOPHILS ABSOLUTE: 0 K/CU MM
BASOPHILS RELATIVE PERCENT: 0.8 % (ref 0–1)
BILIRUB SERPL-MCNC: 0.6 MG/DL (ref 0–1)
BILIRUBIN URINE: NEGATIVE MG/DL
BLOOD, URINE: NEGATIVE
BUN BLDV-MCNC: 18 MG/DL (ref 6–23)
CALCIUM SERPL-MCNC: 9.2 MG/DL (ref 8.3–10.6)
CHLORIDE BLD-SCNC: 98 MMOL/L (ref 99–110)
CHOLESTEROL: 142 MG/DL
CLARITY: CLEAR
CO2: 28 MMOL/L (ref 21–32)
COLOR: YELLOW
CREAT SERPL-MCNC: 1.1 MG/DL (ref 0.9–1.3)
DIFFERENTIAL TYPE: ABNORMAL
EOSINOPHILS ABSOLUTE: 0.3 K/CU MM
EOSINOPHILS RELATIVE PERCENT: 5.2 % (ref 0–3)
GFR AFRICAN AMERICAN: >60 ML/MIN/1.73M2
GFR NON-AFRICAN AMERICAN: >60 ML/MIN/1.73M2
GLUCOSE BLD-MCNC: 105 MG/DL (ref 70–99)
GLUCOSE, URINE: NEGATIVE MG/DL
HCT VFR BLD CALC: 43.4 % (ref 42–52)
HDLC SERPL-MCNC: 70 MG/DL
HEMOGLOBIN: 14.3 GM/DL (ref 13.5–18)
IMMATURE NEUTROPHIL %: 0.4 % (ref 0–0.43)
KETONES, URINE: NEGATIVE MG/DL
LDL CHOLESTEROL DIRECT: 73 MG/DL
LEUKOCYTE ESTERASE, URINE: NEGATIVE
LYMPHOCYTES ABSOLUTE: 1.2 K/CU MM
LYMPHOCYTES RELATIVE PERCENT: 22.2 % (ref 24–44)
MCH RBC QN AUTO: 32.1 PG (ref 27–31)
MCHC RBC AUTO-ENTMCNC: 32.9 % (ref 32–36)
MCV RBC AUTO: 97.3 FL (ref 78–100)
MONOCYTES ABSOLUTE: 0.9 K/CU MM
MONOCYTES RELATIVE PERCENT: 16.4 % (ref 0–4)
MUCUS: ABNORMAL HPF
NITRITE URINE, QUANTITATIVE: NEGATIVE
NUCLEATED RBC %: 0 %
PDW BLD-RTO: 12.3 % (ref 11.7–14.9)
PH, URINE: 7 (ref 5–8)
PLATELET # BLD: 194 K/CU MM (ref 140–440)
PMV BLD AUTO: 11.5 FL (ref 7.5–11.1)
POTASSIUM SERPL-SCNC: 5 MMOL/L (ref 3.5–5.1)
PROSTATE SPECIFIC ANTIGEN: 2.05 NG/ML (ref 0–4)
PROTEIN UA: NEGATIVE MG/DL
RBC # BLD: 4.46 M/CU MM (ref 4.6–6.2)
RBC URINE: <1 /HPF (ref 0–3)
SEGMENTED NEUTROPHILS ABSOLUTE COUNT: 2.8 K/CU MM
SEGMENTED NEUTROPHILS RELATIVE PERCENT: 55 % (ref 36–66)
SODIUM BLD-SCNC: 136 MMOL/L (ref 135–145)
SPECIFIC GRAVITY UA: 1.02 (ref 1–1.03)
TOTAL IMMATURE NEUTOROPHIL: 0.02 K/CU MM
TOTAL NUCLEATED RBC: 0 K/CU MM
TOTAL PROTEIN: 6.5 GM/DL (ref 6.4–8.2)
TRICHOMONAS: ABNORMAL /HPF
TRIGL SERPL-MCNC: 38 MG/DL
UROBILINOGEN, URINE: NORMAL MG/DL (ref 0.2–1)
WBC # BLD: 5.2 K/CU MM (ref 4–10.5)
WBC UA: 1 /HPF (ref 0–2)

## 2019-12-17 PROCEDURE — 85025 COMPLETE CBC W/AUTO DIFF WBC: CPT

## 2019-12-17 PROCEDURE — 80061 LIPID PANEL: CPT

## 2019-12-17 PROCEDURE — 80053 COMPREHEN METABOLIC PANEL: CPT

## 2019-12-17 PROCEDURE — G0103 PSA SCREENING: HCPCS

## 2019-12-17 PROCEDURE — 83721 ASSAY OF BLOOD LIPOPROTEIN: CPT

## 2019-12-17 PROCEDURE — 81001 URINALYSIS AUTO W/SCOPE: CPT

## 2019-12-17 PROCEDURE — 36415 COLL VENOUS BLD VENIPUNCTURE: CPT

## 2019-12-18 ENCOUNTER — PATIENT MESSAGE (OUTPATIENT)
Dept: INTERNAL MEDICINE CLINIC | Age: 74
End: 2019-12-18

## 2019-12-18 DIAGNOSIS — F51.01 PRIMARY INSOMNIA: Primary | ICD-10-CM

## 2019-12-23 RX ORDER — ZOLPIDEM TARTRATE 10 MG/1
10 TABLET ORAL NIGHTLY PRN
Qty: 90 TABLET | Refills: 0 | Status: SHIPPED | OUTPATIENT
Start: 2019-12-23 | End: 2020-01-06

## 2020-03-16 RX ORDER — LOSARTAN POTASSIUM AND HYDROCHLOROTHIAZIDE 12.5; 5 MG/1; MG/1
TABLET ORAL
Qty: 90 TABLET | Refills: 0 | Status: SHIPPED | OUTPATIENT
Start: 2020-03-16 | End: 2020-03-23

## 2020-03-23 RX ORDER — HYDROCHLOROTHIAZIDE 12.5 MG/1
12.5 CAPSULE, GELATIN COATED ORAL DAILY
Qty: 90 CAPSULE | Refills: 1 | Status: SHIPPED | OUTPATIENT
Start: 2020-03-23 | End: 2020-07-28

## 2020-03-23 RX ORDER — LOSARTAN POTASSIUM 50 MG/1
50 TABLET ORAL DAILY
Qty: 90 TABLET | Refills: 1 | Status: SHIPPED | OUTPATIENT
Start: 2020-03-23 | End: 2020-03-24

## 2020-03-23 RX ORDER — HYDROCHLOROTHIAZIDE 12.5 MG/1
12.5 CAPSULE, GELATIN COATED ORAL EVERY MORNING
Qty: 90 CAPSULE | Refills: 1 | Status: CANCELLED | OUTPATIENT
Start: 2020-03-23

## 2020-03-23 RX ORDER — LOSARTAN POTASSIUM 50 MG/1
50 TABLET ORAL DAILY
Qty: 90 TABLET | Refills: 1 | Status: CANCELLED | OUTPATIENT
Start: 2020-03-23

## 2020-03-23 RX ORDER — LOSARTAN POTASSIUM 50 MG/1
50 TABLET ORAL DAILY
Qty: 30 TABLET | Refills: 5 | Status: SHIPPED | OUTPATIENT
Start: 2020-03-23 | End: 2020-03-23

## 2020-03-23 RX ORDER — LOSARTAN POTASSIUM AND HYDROCHLOROTHIAZIDE 12.5; 5 MG/1; MG/1
TABLET ORAL
Qty: 90 TABLET | Refills: 0 | Status: SHIPPED | OUTPATIENT
Start: 2020-03-23 | End: 2020-03-23 | Stop reason: CLARIF

## 2020-03-23 RX ORDER — HYDROCHLOROTHIAZIDE 12.5 MG/1
12.5 CAPSULE, GELATIN COATED ORAL DAILY
Qty: 30 CAPSULE | Refills: 5 | Status: SHIPPED | OUTPATIENT
Start: 2020-03-23 | End: 2020-03-23

## 2020-03-24 RX ORDER — LOSARTAN POTASSIUM 50 MG/1
50 TABLET ORAL DAILY
Qty: 90 TABLET | Refills: 1 | Status: SHIPPED | OUTPATIENT
Start: 2020-03-24 | End: 2020-07-28

## 2020-03-24 RX ORDER — LOSARTAN POTASSIUM 50 MG/1
50 TABLET ORAL DAILY
Qty: 90 TABLET | Refills: 1 | Status: SHIPPED | OUTPATIENT
Start: 2020-03-24 | End: 2020-07-28 | Stop reason: ALTCHOICE

## 2020-06-01 RX ORDER — BUPROPION HYDROCHLORIDE 300 MG/1
300 TABLET ORAL EVERY MORNING
Qty: 90 TABLET | Refills: 0 | Status: SHIPPED | OUTPATIENT
Start: 2020-06-01 | End: 2020-09-02

## 2020-06-01 RX ORDER — DULOXETIN HYDROCHLORIDE 30 MG/1
CAPSULE, DELAYED RELEASE ORAL
Qty: 90 CAPSULE | Refills: 0 | Status: SHIPPED | OUTPATIENT
Start: 2020-06-01 | End: 2020-09-02

## 2020-06-01 RX ORDER — LOSARTAN POTASSIUM AND HYDROCHLOROTHIAZIDE 12.5; 5 MG/1; MG/1
TABLET ORAL
Qty: 90 TABLET | Refills: 0 | Status: SHIPPED | OUTPATIENT
Start: 2020-06-01 | End: 2020-09-02

## 2020-06-08 RX ORDER — ZOLPIDEM TARTRATE 10 MG/1
TABLET ORAL
Qty: 90 TABLET | Refills: 0 | Status: SHIPPED | OUTPATIENT
Start: 2020-06-08 | End: 2020-09-06

## 2020-07-28 ENCOUNTER — OFFICE VISIT (OUTPATIENT)
Dept: INTERNAL MEDICINE CLINIC | Age: 75
End: 2020-07-28
Payer: MEDICARE

## 2020-07-28 VITALS
HEART RATE: 76 BPM | BODY MASS INDEX: 27.5 KG/M2 | OXYGEN SATURATION: 98 % | HEIGHT: 72 IN | SYSTOLIC BLOOD PRESSURE: 120 MMHG | DIASTOLIC BLOOD PRESSURE: 78 MMHG | WEIGHT: 203 LBS | TEMPERATURE: 97.3 F

## 2020-07-28 PROCEDURE — 99214 OFFICE O/P EST MOD 30 MIN: CPT | Performed by: FAMILY MEDICINE

## 2020-07-28 PROCEDURE — 4040F PNEUMOC VAC/ADMIN/RCVD: CPT | Performed by: FAMILY MEDICINE

## 2020-07-28 PROCEDURE — 1123F ACP DISCUSS/DSCN MKR DOCD: CPT | Performed by: FAMILY MEDICINE

## 2020-07-28 PROCEDURE — 1036F TOBACCO NON-USER: CPT | Performed by: FAMILY MEDICINE

## 2020-07-28 PROCEDURE — G8417 CALC BMI ABV UP PARAM F/U: HCPCS | Performed by: FAMILY MEDICINE

## 2020-07-28 PROCEDURE — G8427 DOCREV CUR MEDS BY ELIG CLIN: HCPCS | Performed by: FAMILY MEDICINE

## 2020-07-28 ASSESSMENT — ENCOUNTER SYMPTOMS
DIARRHEA: 0
SORE THROAT: 0
ABDOMINAL PAIN: 0
VOMITING: 0
CHEST TIGHTNESS: 0
CONSTIPATION: 0
COLOR CHANGE: 0
SHORTNESS OF BREATH: 0

## 2020-07-28 ASSESSMENT — PATIENT HEALTH QUESTIONNAIRE - PHQ9
2. FEELING DOWN, DEPRESSED OR HOPELESS: 0
SUM OF ALL RESPONSES TO PHQ QUESTIONS 1-9: 0
SUM OF ALL RESPONSES TO PHQ QUESTIONS 1-9: 0
SUM OF ALL RESPONSES TO PHQ9 QUESTIONS 1 & 2: 0
1. LITTLE INTEREST OR PLEASURE IN DOING THINGS: 0

## 2020-07-28 NOTE — PROGRESS NOTES
Years: 11.00     Pack years: 16.50     Start date: 7/15/1964     Last attempt to quit: 1975     Years since quittin.7    Smokeless tobacco: Never Used   Substance Use Topics    Alcohol use: Yes     Comment: wine daily        Review of Systems   Constitutional: Negative for activity change, appetite change, chills, fever and unexpected weight change. HENT: Negative for congestion and sore throat. Respiratory: Negative for chest tightness and shortness of breath. Cardiovascular: Negative for chest pain and palpitations. Gastrointestinal: Negative for abdominal pain, constipation, diarrhea and vomiting. Genitourinary: Negative for dysuria. Skin: Negative for color change. Neurological: Negative for dizziness and light-headedness. Psychiatric/Behavioral: Negative for dysphoric mood. The patient is not nervous/anxious. Prior to Visit Medications    Medication Sig Taking? Authorizing Provider   zolpidem (AMBIEN) 10 MG tablet TAKE 1 TABLET BY MOUTH NIGHTLY AS NEEDED FOR SLEEP Yes Eun Villafana MD   buPROPion (WELLBUTRIN XL) 300 MG extended release tablet TAKE 1 TABLET BY MOUTH EVERY MORNING Yes Eun Villafana MD   losartan-hydroCHLOROthiazide (HYZAAR) 50-12.5 MG per tablet TAKE 1 TABLET BY MOUTH EVERY DAY Yes Eun Villafana MD   DULoxetine (CYMBALTA) 30 MG extended release capsule TAKE 1 CAPSULE BY MOUTH DAILY AS DIRECTED Yes Eun Villafana MD   linaclotide (LINZESS) 145 MCG capsule Take 1 capsule by mouth every morning (before breakfast) Yes Simeon Toney MD          Objective:      /78   Pulse 76   Temp 97.3 °F (36.3 °C)   Ht 5' 11.75\" (1.822 m)   Wt 203 lb (92.1 kg)   SpO2 98%   BMI 27.72 kg/m²      Physical Exam  Vitals signs and nursing note reviewed. Constitutional:       General: He is not in acute distress. Appearance: Normal appearance. He is not ill-appearing or toxic-appearing. HENT:      Head: Normocephalic and atraumatic. Right Ear: External ear normal.      Left Ear: External ear normal.      Nose: Nose normal.      Mouth/Throat:      Pharynx: Oropharynx is clear. Eyes:      General: No scleral icterus. Right eye: No discharge. Left eye: No discharge. Extraocular Movements: Extraocular movements intact. Conjunctiva/sclera: Conjunctivae normal.   Neck:      Musculoskeletal: Normal range of motion and neck supple. No neck rigidity. Cardiovascular:      Rate and Rhythm: Normal rate and regular rhythm. Heart sounds: Normal heart sounds. Pulmonary:      Effort: Pulmonary effort is normal.      Breath sounds: Normal breath sounds. No wheezing or rales. Musculoskeletal:         General: No deformity. Skin:     General: Skin is warm and dry. Findings: No rash. Neurological:      General: No focal deficit present. Mental Status: He is alert. Mental status is at baseline. Motor: No weakness. Psychiatric:         Mood and Affect: Mood normal.         Behavior: Behavior normal.            Assessment / Plan:      1. Essential hypertension  Stable, well controlled. Continue current meds. - CBC Auto Differential; Future  - Comprehensive Metabolic Panel; Future    2. Mood disorder ([address]  Well controlled with cymbalta and wellbutrin. Can continue Ambien for sleep as needed. 3. Hyperlipidemia, unspecified hyperlipidemia type  Currently diet controlled. Due for labs- will discuss statin at follow up per results. - Lipid Panel; Future          Patient voiced understanding and agreement with plan. All questions/concerns were addressed, risks/side effects of medications were reviewed. Return precautions and after visit summary were provided. Return in about 6 months (around 1/28/2021). or earlier as needed.       Sarah Alvarado MD

## 2020-09-03 RX ORDER — LOSARTAN POTASSIUM AND HYDROCHLOROTHIAZIDE 12.5; 5 MG/1; MG/1
TABLET ORAL
Qty: 90 TABLET | Refills: 1 | Status: SHIPPED | OUTPATIENT
Start: 2020-09-03

## 2020-09-03 RX ORDER — BUPROPION HYDROCHLORIDE 300 MG/1
300 TABLET ORAL EVERY MORNING
Qty: 90 TABLET | Refills: 1 | Status: SHIPPED | OUTPATIENT
Start: 2020-09-03 | End: 2021-06-04

## 2020-09-03 RX ORDER — DULOXETIN HYDROCHLORIDE 30 MG/1
CAPSULE, DELAYED RELEASE ORAL
Qty: 90 CAPSULE | Refills: 1
Start: 2020-09-03 | End: 2020-09-14

## 2020-09-11 NOTE — TELEPHONE ENCOUNTER
Duloxetine did not go through E- prescribe  With other medications called in prescription to pharmacy

## 2020-09-14 RX ORDER — DULOXETIN HYDROCHLORIDE 30 MG/1
CAPSULE, DELAYED RELEASE ORAL
Qty: 90 CAPSULE | Refills: 1 | Status: SHIPPED | OUTPATIENT
Start: 2020-09-14

## 2021-06-04 RX ORDER — VENLAFAXINE HYDROCHLORIDE 150 MG/1
CAPSULE, EXTENDED RELEASE ORAL
COMMUNITY
Start: 2021-05-28

## 2021-06-04 RX ORDER — BUPROPION HYDROCHLORIDE 150 MG/1
TABLET ORAL
COMMUNITY
Start: 2021-05-28

## 2021-06-17 DIAGNOSIS — Z86.010 HX OF COLONIC POLYP: Primary | ICD-10-CM

## 2021-06-17 DIAGNOSIS — Z86.010 HISTORY OF COLONIC POLYPS: ICD-10-CM

## 2021-07-01 NOTE — PROGRESS NOTES
Surgery 07/08/21 @ 0845 arrive @ 0730               1. Do not eat or drink anything after midnight - unless instructed by your doctor prior to surgery. This includes                   no water, chewing gum or mints. 2. Follow your directions as prescribed by the doctor for your procedure and medications. 3. Check with your Doctor regarding stopping Plavix, Coumadin, Lovenox,Effient,Pradaxa,Xarelto, Fragmin or other blood thinners and                   follow their instructions. 4. Do not smoke, and do not drink any alcoholic beverages 24 hours prior to surgery. This includes NA Beer. 5. You may brush your teeth and gargle the morning of surgery. DO NOT SWALLOW WATER   6. You MUST make arrangements for a responsible adult to take you home after your surgery and be able to check on you every couple                   hours for the day. You will not be allowed to leave alone or drive yourself home. It is strongly suggested someone stay with you the first 24                   hrs. Your surgery will be cancelled if you do not have a ride home. 7. Please wear simple, loose fitting clothing to the hospital.  [place] not bring valuables (money, credit cards, checkbooks, etc.) Do not wear any                   makeup (including no eye makeup) or nail polish on your fingers or toes. 8. DO NOT wear any jewelry or piercings on day of surgery. All body piercing jewelry must be removed. 9. If you have dentures, they will be removed before going to the OR; we will provide you a container. If you wear contact lenses or glasses,                  they will be removed; please bring a case for them. 10. If you  have a Living Will and Durable Power of  for Healthcare, please bring in a copy. 11. Please bring picture ID,  insurance card, paperwork from the doctors office    (H & P, Consent, & card for implantable devices).            12. Take a shower the night before or morning of your procedure, do not apply any lotion, oil or powder. 13. Wear a mask covering your nose & mouth when entering the hospital. Have your covid-19 test performed within 10 days of your                  Surgery. Pt states he is fully vaccinated, will bring card day of procedure. Quarantine yourself after the test until after your surgery.

## 2021-07-06 ENCOUNTER — ANESTHESIA EVENT (OUTPATIENT)
Dept: OPERATING ROOM | Age: 76
End: 2021-07-06
Payer: MEDICARE

## 2021-07-06 NOTE — ANESTHESIA PRE PROCEDURE
Department of Anesthesiology  Preprocedure Note       Name:  Dara Browne   Age:  67 y.o.  :  1945                                          MRN:  [id number]         Date:  2021      Surgeon: Thomas Rosales):  Brice Awad MD    Procedure: Procedure(s):  COLONOSCOPY DIAGNOSTIC    Medications prior to admission:   Prior to Admission medications    Medication Sig Start Date End Date Taking? Authorizing Provider   losartan-hydroCHLOROthiazide (HYZAAR) 50-12.5 MG per tablet TAKE 1 TABLET BY MOUTH EVERY DAY 9/3/20  Yes Madhuri Morgan MD   buPROPion (WELLBUTRIN XL) 150 MG extended release tablet  21   Historical Provider, MD   venlafaxine (EFFEXOR XR) 150 MG extended release capsule  21   Historical Provider, MD   DULoxetine (CYMBALTA) 30 MG extended release capsule TAKE 1 CAPSULE BY MOUTH DAILY AS DIRECTED 20   Madhuri Morgan MD   linaclotide [facility]) 145 MCG capsule Take 1 capsule by mouth every morning (before breakfast) 9/15/19   Marley Carlson MD       Current medications:    No current facility-administered medications for this encounter.      Current Outpatient Medications   Medication Sig Dispense Refill    losartan-hydroCHLOROthiazide (HYZAAR) 50-12.5 MG per tablet TAKE 1 TABLET BY MOUTH EVERY DAY 90 tablet 1    buPROPion (WELLBUTRIN XL) 150 MG extended release tablet       venlafaxine (EFFEXOR XR) 150 MG extended release capsule       DULoxetine (CYMBALTA) 30 MG extended release capsule TAKE 1 CAPSULE BY MOUTH DAILY AS DIRECTED 90 capsule 1    linaclotide (LINZESS) 145 MCG capsule Take 1 capsule by mouth every morning (before breakfast) 90 capsule 1       Allergies:  No Known Allergies    Problem List:    Patient Active Problem List   Diagnosis Code    Mood disorder (HCC) F39    Adenomatous polyp of colon D12.6    Ocular histoplasmosis syndrome of both eyes B39.9, H32    Hypertension I10    Medication management Z79.899    Hyperlipidemia E78.5    Sleep disorder G47.9       Past Medical History:        Diagnosis Date    Adenomatous polyp of colon 2001    Mildly Dysplastic Tubular Adenoma    Basal cell carcinoma of cheek 2012    left temple; Moh's; [facility]    Basal cell carcinoma of skin of trunk 2010    front of neck; Kay Garduno & Krystal Estrada    Hyperlipidemia     lipids are OK; but [place] 12%    Hypertension 10/2014    Melanoma in situ of scalp ([facility] 2012    Vertex scalp; Van level III    Mood disorder ([address]     Irritabilitiy    Ocular histoplasmosis syndrome of both eyes     left eye greater than right eye-S/P laser treatment in [facility] [place] [employer];  Avastin ; Dr Sam Peterson Sleep disorder     chronic       Past Surgical History:        Procedure Laterality Date    CARPAL TUNNEL RELEASE Right 2008    COLONOSCOPY      COLONOSCOPY      COLONOSCOPY  2018    polyps 4, call next week for pathology results    COLONOSCOPY N/A 2018    COLONOSCOPY POLYPECTOMY SNARE/COLD BIOPSY performed by Briana Marie MD at [facility] ARTHROSCOPY Right 2015    Dr Oly Bull      L4-L5    [address]  2012    left temple: [facility]; [address] [place] [address]  SKIN CANCER EXCISION  2012    Melanoma; scalp       Social History:    Social History     Tobacco Use    Smoking status: Former Smoker     Packs/day: 1.50     Years: 11.00     Pack years: 16.50     Start date: 7/15/1964     Quit date: 1975     Years since quittin.11    Smokeless tobacco: Never Used   Substance Use Topics    Alcohol use: Yes     Comment: wine daily                                Counseling given: Not Answered      Vital Signs (Current):   Vitals:    21 1125   Weight: 195 lb (88.5 kg)   Height: 6' (1.829 m)                                              BP Readings from Last 3 Encounters:   20 120/78   10/07/19 132/74   18 (!) 93/54       NPO Status: Blocker         Neuro/Psych:   (+) psychiatric history:            GI/Hepatic/Renal:   (+) bowel prep,           Endo/Other:    (+) malignancy/cancer. ROS comment: Basal cell carcinoma of skin of trunk Abdominal:             Vascular: negative vascular ROS. Other Findings:           Anesthesia Plan      MAC     ASA 2       Induction: intravenous. Anesthetic plan and risks discussed with patient. NABEEL Khan CRNA   7/6/2021      Pre Anesthesia Evaluation complete. Anesthesia plan, risks, benefits, alternatives, and personnel discussed with patient and/or legal guardian. Patient and/or legal guardian verbalized an understanding and agreed to proceed. Anesthesia plan discussed with care team members and agreed upon.   NABEEL Khan CRNA  7/8/2021  COVID vaccjessicarted

## 2021-07-08 ENCOUNTER — HOSPITAL ENCOUNTER (OUTPATIENT)
Age: 76
Setting detail: OUTPATIENT SURGERY
Discharge: HOME | End: 2021-07-08
Attending: SPECIALIST | Admitting: SPECIALIST
Payer: MEDICARE

## 2021-07-08 ENCOUNTER — ANESTHESIA (OUTPATIENT)
Dept: OPERATING ROOM | Age: 76
End: 2021-07-08
Payer: MEDICARE

## 2021-07-08 VITALS — SYSTOLIC BLOOD PRESSURE: 100 MMHG | OXYGEN SATURATION: 100 % | DIASTOLIC BLOOD PRESSURE: 63 MMHG

## 2021-07-08 VITALS
BODY MASS INDEX: 26.41 KG/M2 | OXYGEN SATURATION: 100 % | DIASTOLIC BLOOD PRESSURE: 77 MMHG | TEMPERATURE: 96.8 F | HEIGHT: 72 IN | SYSTOLIC BLOOD PRESSURE: 143 MMHG | HEART RATE: 60 BPM | WEIGHT: 195 LBS | RESPIRATION RATE: 16 BRPM

## 2021-07-08 PROCEDURE — 7100000010 HC PHASE II RECOVERY - FIRST 15 MIN: Performed by: SPECIALIST

## 2021-07-08 PROCEDURE — 3700000000 HC ANESTHESIA ATTENDED CARE: Performed by: SPECIALIST

## 2021-07-08 PROCEDURE — 45385 COLONOSCOPY W/LESION REMOVAL: CPT | Performed by: SPECIALIST

## 2021-07-08 PROCEDURE — 3700000001 HC ADD 15 MINUTES (ANESTHESIA): Performed by: SPECIALIST

## 2021-07-08 PROCEDURE — 88305 TISSUE EXAM BY PATHOLOGIST: CPT | Performed by: PATHOLOGY

## 2021-07-08 PROCEDURE — 2709999900 HC NON-CHARGEABLE SUPPLY: Performed by: SPECIALIST

## 2021-07-08 PROCEDURE — 6360000002 HC RX W HCPCS: Performed by: NURSE ANESTHETIST, CERTIFIED REGISTERED

## 2021-07-08 PROCEDURE — 2580000003 HC RX 258: Performed by: SPECIALIST

## 2021-07-08 PROCEDURE — 3609010600 HC COLONOSCOPY POLYPECTOMY SNARE/COLD BIOPSY: Performed by: SPECIALIST

## 2021-07-08 PROCEDURE — 7100000011 HC PHASE II RECOVERY - ADDTL 15 MIN: Performed by: SPECIALIST

## 2021-07-08 RX ORDER — LIDOCAINE HYDROCHLORIDE 20 MG/ML
INJECTION, SOLUTION INTRAVENOUS PRN
Status: DISCONTINUED | OUTPATIENT
Start: 2021-07-08 | End: 2021-07-08 | Stop reason: SDUPTHER

## 2021-07-08 RX ORDER — PHENYLEPHRINE HYDROCHLORIDE 10 MG/ML
INJECTION INTRAVENOUS PRN
Status: DISCONTINUED | OUTPATIENT
Start: 2021-07-08 | End: 2021-07-08 | Stop reason: SDUPTHER

## 2021-07-08 RX ORDER — PROPOFOL 10 MG/ML
INJECTION, EMULSION INTRAVENOUS PRN
Status: DISCONTINUED | OUTPATIENT
Start: 2021-07-08 | End: 2021-07-08 | Stop reason: SDUPTHER

## 2021-07-08 RX ORDER — SODIUM CHLORIDE, SODIUM LACTATE, POTASSIUM CHLORIDE, CALCIUM CHLORIDE 600; 310; 30; 20 MG/100ML; MG/100ML; MG/100ML; MG/100ML
INJECTION, SOLUTION INTRAVENOUS CONTINUOUS
Status: DISCONTINUED | OUTPATIENT
Start: 2021-07-08 | End: 2021-07-08 | Stop reason: HOSPADM

## 2021-07-08 RX ADMIN — PHENYLEPHRINE HYDROCHLORIDE 50 MCG: 10 INJECTION INTRAVENOUS at 09:20

## 2021-07-08 RX ADMIN — PHENYLEPHRINE HYDROCHLORIDE 50 MCG: 10 INJECTION INTRAVENOUS at 09:28

## 2021-07-08 RX ADMIN — SODIUM CHLORIDE, POTASSIUM CHLORIDE, SODIUM LACTATE AND CALCIUM CHLORIDE: 600; 310; 30; 20 INJECTION, SOLUTION INTRAVENOUS at 08:08

## 2021-07-08 RX ADMIN — PROPOFOL 300 MG: 10 INJECTION, EMULSION INTRAVENOUS at 08:56

## 2021-07-08 RX ADMIN — PHENYLEPHRINE HYDROCHLORIDE 50 MCG: 10 INJECTION INTRAVENOUS at 09:12

## 2021-07-08 RX ADMIN — PHENYLEPHRINE HYDROCHLORIDE 50 MCG: 10 INJECTION INTRAVENOUS at 09:14

## 2021-07-08 RX ADMIN — SODIUM CHLORIDE, POTASSIUM CHLORIDE, SODIUM LACTATE AND CALCIUM CHLORIDE: 600; 310; 30; 20 INJECTION, SOLUTION INTRAVENOUS at 09:28

## 2021-07-08 RX ADMIN — LIDOCAINE HYDROCHLORIDE 100 MG: 20 INJECTION, SOLUTION INTRAVENOUS at 08:56

## 2021-07-08 ASSESSMENT — PAIN SCALES - GENERAL
PAINLEVEL_OUTOF10: 0
PAINLEVEL_OUTOF10: 0

## 2021-07-08 ASSESSMENT — PAIN - FUNCTIONAL ASSESSMENT: PAIN_FUNCTIONAL_ASSESSMENT: 0-10

## 2021-07-08 NOTE — PROGRESS NOTES
0091 Pt received from [facility] and report received from [facility]. Pt lying on left side and denies c/o. Call light in reach. Wife to beside. Pt declined beverage offer. 21  Pt discharged to home to wife's vehicle.

## 2021-07-08 NOTE — ANESTHESIA POSTPROCEDURE EVALUATION
Department of Anesthesiology  Postprocedure Note    Patient: Oly Garcia  MRN: [id number]  YOB: 1945  Date of evaluation: 7/8/2021  Time:  9:39 AM     Procedure Summary     Date: 07/08/21 Room / Location: [address]    Anesthesia Start: 0377 Anesthesia Stop: 4673    Procedure: COLONOSCOPY POLYPECTOMY SNARE/COLD BIOPSY (N/A ) Diagnosis: (HX OF POLYPS)    Surgeons: Wilbert Reeves MD Responsible Provider: NABEEL Hay CRNA    Anesthesia Type: MAC ASA Status: 2          Anesthesia Type: MAC    Branden Phase I:  10    Branden Phase II:  10    Last vitals: Reviewed and per EMR flowsheets.        Anesthesia Post Evaluation    Patient location during evaluation: bedside  Patient participation: complete - patient participated  Level of consciousness: awake and alert  Pain score: 0  Airway patency: patent  Nausea & Vomiting: no nausea and no vomiting  Complications: no  Cardiovascular status: hemodynamically stable  Respiratory status: acceptable, room air, spontaneous ventilation and nonlabored ventilation  Hydration status: euvolemic

## 2021-07-08 NOTE — BRIEF OP NOTE
BRIEF COLONOSCOPY REPORT:   Photos and full colonoscopy report available by going to \"chart review\" then \"procedures\" then  \"colonoscopy\" then \"View Report\"      IMPRESSION :   1) 5 mm polyp removed from the hepatic flexure with the cold snare  2) two 6 mm polyps removed from the upper descending colon with the cold snare  3) small internal hemorrhoids  4) otherwise normal colon    PLAN : Colonoscopy in 3 years

## 2021-07-08 NOTE — H&P
Original H &P in soft chart. I have examined the patient immediately before the procedure and there is no change in the previous history and physical exam, which has been reviewed. There is no history of sleep apnea, snoring, or stridor. There has been no  previous adverse experience with sedation/anesthesia. There is no increased risk for aspiration of gastric contents. The patient has been instructed that all resuscitative measures (during the operative and immediate perioperative period) will be instituted in the unlikely event that they will be needed. The patient has no pertinent past surgical or family history other than listed in the original H&P. The patient was counseled about the risks of aris Covid-19 during their perioperative period and any recovery window from their procedure. The patient was made aware that aris Covid-19  may worsen their prognosis for recovering from their procedure  and lend to a higher morbidity and/or mortality risk. All material risks, benefits, and reasonable alternatives including postponing the procedure were discussed. The patient does wish to proceed with the procedure at this time.     ASA Class: 2  AIRWAY Class: 1

## 2021-09-03 ENCOUNTER — HOSPITAL ENCOUNTER (OUTPATIENT)
Dept: NUCLEAR MEDICINE | Age: 76
Discharge: HOME | End: 2021-09-03
Payer: MEDICARE

## 2021-09-03 DIAGNOSIS — C61 MALIGNANT NEOPLASM OF PROSTATE (HCC): ICD-10-CM

## 2021-09-03 PROCEDURE — 78306 BONE IMAGING WHOLE BODY: CPT

## 2021-09-03 PROCEDURE — 3430000000 HC RX DIAGNOSTIC RADIOPHARMACEUTICAL: Performed by: SPECIALIST

## 2021-09-03 PROCEDURE — A9503 TC99M MEDRONATE: HCPCS | Performed by: SPECIALIST

## 2021-09-03 RX ORDER — TC 99M MEDRONATE 20 MG/10ML
25 INJECTION, POWDER, LYOPHILIZED, FOR SOLUTION INTRAVENOUS
Status: COMPLETED | OUTPATIENT
Start: 2021-09-03 | End: 2021-09-03

## 2021-09-03 RX ADMIN — TC 99M MEDRONATE 25 MILLICURIE: 20 INJECTION, POWDER, LYOPHILIZED, FOR SOLUTION INTRAVENOUS at 10:00

## 2022-08-22 ENCOUNTER — TELEPHONE (OUTPATIENT)
Dept: CARDIOLOGY CLINIC | Age: 77
End: 2022-08-22

## 2022-08-22 NOTE — TELEPHONE ENCOUNTER
Spoke with patients wife per Lamar Anglin patient needs to see Dr Nona Jaimes this week. Patient has ppm and has been having syncopal episodes and possible TIAs. Keane Salt called [facility].

## 2022-08-23 ENCOUNTER — TELEPHONE (OUTPATIENT)
Dept: NEUROLOGY | Age: 77
End: 2022-08-23

## 2022-08-23 NOTE — TELEPHONE ENCOUNTER
[address] [facility]. at Cardiology reached out regarding a request for patient to be seen by neurology. I reviewed the record but was struggling to understand what neurological symptoms the patient was having. I contacted the wife and spoke with her over the phone regarding the patients condition. Patient had an episode in which he became weak, fell and wife felt like he appeared that he was losing consciousness. He recently was worked up and a pacemaker was placed. Wife feels like he is still having issues. She complains of weakness and personality changes. Dr. Deborah Lopes did order an MRI but it was postponed b.c he had the pacemaker. I reached out to him and asked that he place a referral for neurology and inquired on the MRI. MRI has been approve by insurance and sent to [facility]. I will call patient and update them that they should move forward with trying to get the MRI scheduled and reach back out once we receive the referral from the PCP.

## 2022-08-26 ENCOUNTER — INITIAL CONSULT (OUTPATIENT)
Dept: CARDIOLOGY CLINIC | Age: 77
End: 2022-08-26
Payer: MEDICARE

## 2022-08-26 VITALS
HEIGHT: 72 IN | HEART RATE: 78 BPM | DIASTOLIC BLOOD PRESSURE: 86 MMHG | SYSTOLIC BLOOD PRESSURE: 146 MMHG | BODY MASS INDEX: 30.61 KG/M2 | WEIGHT: 226 LBS | RESPIRATION RATE: 18 BRPM

## 2022-08-26 DIAGNOSIS — I63.9 CEREBROVASCULAR ACCIDENT (CVA), UNSPECIFIED MECHANISM (HCC): ICD-10-CM

## 2022-08-26 DIAGNOSIS — Z95.0 PACEMAKER: Primary | ICD-10-CM

## 2022-08-26 DIAGNOSIS — I10 HYPERTENSION, UNSPECIFIED TYPE: ICD-10-CM

## 2022-08-26 PROCEDURE — G8417 CALC BMI ABV UP PARAM F/U: HCPCS | Performed by: INTERNAL MEDICINE

## 2022-08-26 PROCEDURE — 1036F TOBACCO NON-USER: CPT | Performed by: INTERNAL MEDICINE

## 2022-08-26 PROCEDURE — 93280 PM DEVICE PROGR EVAL DUAL: CPT | Performed by: INTERNAL MEDICINE

## 2022-08-26 PROCEDURE — 1123F ACP DISCUSS/DSCN MKR DOCD: CPT | Performed by: INTERNAL MEDICINE

## 2022-08-26 PROCEDURE — G8427 DOCREV CUR MEDS BY ELIG CLIN: HCPCS | Performed by: INTERNAL MEDICINE

## 2022-08-26 PROCEDURE — 99204 OFFICE O/P NEW MOD 45 MIN: CPT | Performed by: INTERNAL MEDICINE

## 2022-08-26 RX ORDER — LOSARTAN POTASSIUM 50 MG/1
TABLET ORAL
COMMUNITY
Start: 2022-06-03

## 2022-08-26 RX ORDER — ESZOPICLONE 3 MG/1
TABLET, FILM COATED ORAL
COMMUNITY
Start: 2022-07-20

## 2022-08-26 RX ORDER — TIMOLOL MALEATE 5 MG/ML
SOLUTION/ DROPS OPHTHALMIC
COMMUNITY
Start: 2021-09-14

## 2022-08-26 RX ORDER — ZOLPIDEM TARTRATE 10 MG/1
10 TABLET ORAL DAILY PRN
COMMUNITY

## 2022-08-26 RX ORDER — FLUOXETINE HYDROCHLORIDE 20 MG/1
CAPSULE ORAL
COMMUNITY
Start: 2022-05-19

## 2022-08-26 NOTE — PROGRESS NOTES
Electrophysiology Consult Note      Reason for consultation: TIA / Pacemaker    Chief complaint : Left lip droop, fatigure    Referring physician: Tamara Strong      Primary care physician: Linda Hanks MD      History of Present Illness:     Patient is a 65 yr old male with hx of HTN, HLD, Pacemaker here to discuss about pacemaker and establish care here. Patient reportedly needed MRI for stroke evaluation but [facility] denied it because of his pacemaker. Patient had a pacemaker done in May at [facility] after questionable syncopal episode. Patient wife reports in March she had an episode where he was not responding for few seconds and then later he had another episode in May and when he went to the hospital they recommended either loop recorder or pacemaker and because of the low heart rate. Patient wife and daughter are concerned because he may have a risk of stroke because he is left side of the lip is pulling to the side which was not there before. Pastmedical history:   Past Medical History:   Diagnosis Date    Adenomatous polyp of colon 1/2001    Mildly Dysplastic Tubular Adenoma    Basal cell carcinoma of cheek 12/2012    left temple; Moh's; [facility]    Basal cell carcinoma of skin of trunk 11/2010    front of neck; [facility]    Hyperlipidemia 2015    lipids are OK; but [place] 12%    Hypertension 10/2014    Melanoma in situ of scalp ([address] 12/2012    Vertex scalp; [place] level III    Mood disorder ([address]     Irritabilitiy    Ocular histoplasmosis syndrome of both eyes 2000    left eye greater than right eye-S/P laser treatment in [place];  Avastin ; Dr Gibson [place]    Sleep disorder     chronic       Surgical history :   Past Surgical History:   Procedure Laterality Date    CARPAL TUNNEL RELEASE Right 07/02/2008    COLONOSCOPY  2015    COLONOSCOPY  2011    COLONOSCOPY  12/07/2018    polyps 4, call next week for pathology results COLONOSCOPY N/A 12/7/2018    COLONOSCOPY POLYPECTOMY SNARE/COLD BIOPSY performed by Alex Baron MD at [address] N/A 7/8/2021    COLONOSCOPY POLYPECTOMY SNARE/COLD BIOPSY performed by Alex Baron MD at [id number] [facility] ARTHROSCOPY Right 9/2015    Dr Cherri Arriaga      L4-L5    MOHS SURGERY  12/2012    left temple: [address]; [facility]    SKIN CANCER EXCISION  11/2012    Melanoma; scalp       Family history:   Family History   Problem Relation Age of Onset    Heart Disease Mother         d. age 68    Cancer Father         d. age 46 of lymphoma         Social history :  reports that he quit smoking about 47 years ago. He started smoking about 58 years ago. He has a 16.50 pack-year smoking history. He has never used smokeless tobacco. He reports current alcohol use. He reports that he does not use drugs. No Known Allergies    Current Outpatient Medications on File Prior to Visit   Medication Sig Dispense Refill    eszopiclone (LUNESTA) 3 MG TABS TAKE 1 TABLET BY MOUTH AT BEDTIME      losartan (COZAAR) 50 MG tablet       timolol (TIMOPTIC) 0.5 % ophthalmic solution INSTILL 1 DROP IN LEFT EYE DAILY IN THE MORNING      FLUoxetine (PROZAC) 20 MG capsule TAKE 1 CAPSULE BY MOUTH once daily IN THE EVENING      zolpidem (AMBIEN) 10 MG tablet Take 10 mg by mouth daily as needed.       buPROPion (WELLBUTRIN XL) 150 MG extended release tablet  (Patient not taking: Reported on 8/26/2022)      venlafaxine (EFFEXOR XR) 150 MG extended release capsule  (Patient not taking: Reported on 8/26/2022)      DULoxetine (CYMBALTA) 30 MG extended release capsule TAKE 1 CAPSULE BY MOUTH DAILY AS DIRECTED (Patient not taking: Reported on 8/26/2022) 90 capsule 1    losartan-hydroCHLOROthiazide (HYZAAR) 50-12.5 MG per tablet TAKE 1 TABLET BY MOUTH EVERY DAY (Patient not taking: Reported on 8/26/2022) 90 tablet 1    linaclotide (LINZESS) 145 MCG capsule Take 1 capsule by mouth every morning (before breakfast) (Patient not taking: Reported on 8/26/2022) 90 capsule 1     No current facility-administered medications on file prior to visit. Review of Systems:   Review of Systems   Constitutional:  Positive for fatigue. Negative for activity change, chills and fever. HENT:  Negative for congestion, ear pain and tinnitus. Eyes:  Negative for photophobia, pain and visual disturbance. Respiratory:  Negative for cough, chest tightness, shortness of breath and wheezing. Cardiovascular:  Negative for chest pain, palpitations and leg swelling. Gastrointestinal:  Negative for abdominal pain, blood in stool, constipation, diarrhea, nausea and vomiting. Endocrine: Negative for cold intolerance and heat intolerance. Genitourinary:  Negative for dysuria, flank pain and hematuria. Musculoskeletal:  Positive for arthralgias. Negative for back pain, myalgias and neck stiffness. Skin:  Negative for color change and rash. Allergic/Immunologic: Negative for food allergies. Neurological:  Negative for dizziness, light-headedness, numbness and headaches. Hematological:  Does not bruise/bleed easily. Psychiatric/Behavioral:  Negative for agitation, behavioral problems and confusion. Examination:      Vitals:    08/26/22 1053   BP: (!) 150/90   Site: Left Upper Arm   Position: Sitting   Cuff Size: Large Adult   Pulse: 78   Resp: 18   Weight: 226 lb (102.5 kg)   Height: 6' (1.829 m)        Body mass index is 30.65 kg/m². Physical Exam  Constitutional:       Appearance: Normal appearance. He is not ill-appearing. HENT:      Head: Normocephalic and atraumatic. Mouth/Throat:      Mouth: Mucous membranes are moist.   Eyes:      Conjunctiva/sclera: Conjunctivae normal.   Cardiovascular:      Rate and Rhythm: Normal rate and regular rhythm. Heart sounds: No murmur heard. Pulmonary:      Effort: Pulmonary effort is normal.      Breath sounds: No rales. Abdominal:      General: Abdomen is flat. Palpations: Abdomen is soft. Musculoskeletal:         General: No tenderness. Normal range of motion. Cervical back: Normal range of motion. Right lower leg: No edema. Left lower leg: No edema. Skin:     General: Skin is warm and dry. Neurological:      General: No focal deficit present. Mental Status: He is alert and oriented to person, place, and time. CBC:   Lab Results   Component Value Date/Time    WBC 5.2 12/17/2019 07:24 AM    HGB 14.3 12/17/2019 07:24 AM    HCT 43.4 12/17/2019 07:24 AM     12/17/2019 07:24 AM     Lipids:  Lab Results   Component Value Date    CHOL 142 12/17/2019    TRIG 38 12/17/2019    HDL 70 12/17/2019    LDLCALC 72 10/27/2017    LDLDIRECT 73 12/17/2019     PT/INR: No results found for: INR     BMP:    Lab Results   Component Value Date     12/17/2019    K 5.0 12/17/2019    CL 98 (L) 12/17/2019    CO2 28 12/17/2019    BUN 18 12/17/2019     CMP:   Lab Results   Component Value Date    AST 21 12/17/2019    PROT 6.5 12/17/2019    BILITOT 0.6 12/17/2019    ALKPHOS 61 12/17/2019     TSH:  No results found for: TSH, T4    EKGINTERPRETATION - EKG Interpretation:        Device Assessment:      The device is Medtronic pacemaker - Dual Chamber chamber      MRI Compatible : yes    Device interrogation was performed. Mode: AAIR --- DDDR     Sensing is normal. Impedence is normal.  Threshold is normal.     There has not been interval changes. Estimated battery life is 12.1 years     The underlying rhythm is AP, VS.  71.2 % atrial paced; 0.1 % ventricular paced. Atrial Arrhythmia : No    Non sustained VT episodes : No    Sustained VT episodes : No    Patient activity reported 2.5 hrs/day    The patient is mostly atrial pacemaker dependent. Interpreted by    Shanda Payan M.D             IMPRESSION / RECOMMENDATIONS:     Symptomatic bradycardia sp pacemaker  ? TIA  HTN  HLD    Patient had a pacemaker done in May at [facility] after questionable syncopal episode. Patient wife reports in March she had an episode where he was not responding for few seconds and then later he had another episode in May and when he went to the hospital they recommended either loop recorder or pacemaker and because of the low heart rate. Patient wife and daughter are concerned because he may have a risk of stroke because he is left side of the lip is pulling to the side which was not there before. Interestingly there has not seen a neurologist before. They were supposed to get an MRI which was ordered by Dr. Deepak Ragland but [facility] refused because he has a pacemaker    Not sure why it was refused because pacemaker is compatible for an MRI. Not sure if the rep was not there and that is the reason why  they did not want to do it. I discussed with them that they can have an MRI in [facility] if it is ordered. I also recommended to see the neurologist.      Patient is not on aspirin I discussed with starting aspirin and if it was concerning but they did not want to do that because he bruises easily until it is absolutely needed. Refer to neurologist for assessment of stroke  Okay to do MRI from my standpoint    Blood pressure was mildly elevated patient reports that his losartan was actually decreased because he was having low blood pressure now is at systolic of 393. I told the patient that if it is consistently about 150 we should increase his medication. Recommended patient to take blood pressure twice a day at home and if it is consistently above 140 to call us or the primary care to adjust his medication    On device no arrhythmia noted    Thanks again for allowing me to participate in care of this patient. Please call me if you have any questions. With best regards.       Raul Payan MD, 8/26/2022 11:03 AM     Please note this report has been partially produced using speech recognition software

## 2022-08-28 ENCOUNTER — APPOINTMENT (OUTPATIENT)
Dept: GENERAL RADIOLOGY | Age: 77
End: 2022-08-28
Payer: MEDICARE

## 2022-08-28 ENCOUNTER — HOSPITAL ENCOUNTER (EMERGENCY)
Age: 77
Discharge: ANOTHER HOSPITAL | End: 2022-08-28
Attending: EMERGENCY MEDICINE
Payer: MEDICARE

## 2022-08-28 ENCOUNTER — APPOINTMENT (OUTPATIENT)
Dept: CT IMAGING | Age: 77
End: 2022-08-28
Payer: MEDICARE

## 2022-08-28 VITALS
BODY MASS INDEX: 30.07 KG/M2 | OXYGEN SATURATION: 96 % | SYSTOLIC BLOOD PRESSURE: 152 MMHG | HEIGHT: 72 IN | WEIGHT: 222 LBS | RESPIRATION RATE: 19 BRPM | HEART RATE: 68 BPM | TEMPERATURE: 98.5 F | DIASTOLIC BLOOD PRESSURE: 93 MMHG

## 2022-08-28 DIAGNOSIS — R41.82 ALTERED MENTAL STATUS, UNSPECIFIED ALTERED MENTAL STATUS TYPE: Primary | ICD-10-CM

## 2022-08-28 DIAGNOSIS — G93.89 BRAIN MASS: ICD-10-CM

## 2022-08-28 LAB
ALBUMIN SERPL-MCNC: 4.4 GM/DL (ref 3.4–5)
ALP BLD-CCNC: 66 IU/L (ref 40–129)
ALT SERPL-CCNC: 14 U/L (ref 10–40)
ANION GAP SERPL CALCULATED.3IONS-SCNC: 9 MMOL/L (ref 4–16)
APTT: 24.5 SECONDS (ref 25.1–37.1)
AST SERPL-CCNC: 17 IU/L (ref 15–37)
BASOPHILS ABSOLUTE: 0 K/CU MM
BASOPHILS RELATIVE PERCENT: 0.5 % (ref 0–1)
BILIRUB SERPL-MCNC: 0.6 MG/DL (ref 0–1)
BUN BLDV-MCNC: 19 MG/DL (ref 6–23)
CALCIUM SERPL-MCNC: 9 MG/DL (ref 8.3–10.6)
CHLORIDE BLD-SCNC: 100 MMOL/L (ref 99–110)
CO2: 25 MMOL/L (ref 21–32)
CREAT SERPL-MCNC: 0.9 MG/DL (ref 0.9–1.3)
DIFFERENTIAL TYPE: ABNORMAL
EKG ATRIAL RATE: 61 BPM
EKG DIAGNOSIS: NORMAL
EKG P AXIS: 47 DEGREES
EKG P-R INTERVAL: 132 MS
EKG Q-T INTERVAL: 404 MS
EKG QRS DURATION: 98 MS
EKG QTC CALCULATION (BAZETT): 406 MS
EKG R AXIS: 4 DEGREES
EKG T AXIS: 46 DEGREES
EKG VENTRICULAR RATE: 61 BPM
EOSINOPHILS ABSOLUTE: 0.1 K/CU MM
EOSINOPHILS RELATIVE PERCENT: 2.1 % (ref 0–3)
GFR AFRICAN AMERICAN: >60 ML/MIN/1.73M2
GFR NON-AFRICAN AMERICAN: >60 ML/MIN/1.73M2
GLUCOSE BLD-MCNC: 106 MG/DL (ref 70–99)
HCT VFR BLD CALC: 45.4 % (ref 42–52)
HEMOGLOBIN: 15.5 GM/DL (ref 13.5–18)
IMMATURE NEUTROPHIL %: 0.3 % (ref 0–0.43)
INR BLD: 0.92 INDEX
LYMPHOCYTES ABSOLUTE: 0.6 K/CU MM
LYMPHOCYTES RELATIVE PERCENT: 9.3 % (ref 24–44)
MAGNESIUM: 2 MG/DL (ref 1.8–2.4)
MCH RBC QN AUTO: 32.4 PG (ref 27–31)
MCHC RBC AUTO-ENTMCNC: 34.1 % (ref 32–36)
MCV RBC AUTO: 95 FL (ref 78–100)
MONOCYTES ABSOLUTE: 0.8 K/CU MM
MONOCYTES RELATIVE PERCENT: 12.3 % (ref 0–4)
NUCLEATED RBC %: 0 %
PDW BLD-RTO: 12.3 % (ref 11.7–14.9)
PLATELET # BLD: 179 K/CU MM (ref 140–440)
PMV BLD AUTO: 11 FL (ref 7.5–11.1)
POTASSIUM SERPL-SCNC: 4.4 MMOL/L (ref 3.5–5.1)
PRO-BNP: 265.2 PG/ML
PROTHROMBIN TIME: 11.9 SECONDS (ref 11.7–14.5)
RBC # BLD: 4.78 M/CU MM (ref 4.6–6.2)
SEGMENTED NEUTROPHILS ABSOLUTE COUNT: 5 K/CU MM
SEGMENTED NEUTROPHILS RELATIVE PERCENT: 75.5 % (ref 36–66)
SODIUM BLD-SCNC: 134 MMOL/L (ref 135–145)
TOTAL IMMATURE NEUTOROPHIL: 0.02 K/CU MM
TOTAL NUCLEATED RBC: 0 K/CU MM
TOTAL PROTEIN: 6.9 GM/DL (ref 6.4–8.2)
TROPONIN T: <0.01 NG/ML
TSH HIGH SENSITIVITY: 2.27 UIU/ML (ref 0.27–4.2)
WBC # BLD: 6.6 K/CU MM (ref 4–10.5)

## 2022-08-28 PROCEDURE — 85610 PROTHROMBIN TIME: CPT

## 2022-08-28 PROCEDURE — 84443 ASSAY THYROID STIM HORMONE: CPT

## 2022-08-28 PROCEDURE — 83735 ASSAY OF MAGNESIUM: CPT

## 2022-08-28 PROCEDURE — 93005 ELECTROCARDIOGRAM TRACING: CPT | Performed by: PHYSICIAN ASSISTANT

## 2022-08-28 PROCEDURE — 96365 THER/PROPH/DIAG IV INF INIT: CPT

## 2022-08-28 PROCEDURE — 85730 THROMBOPLASTIN TIME PARTIAL: CPT

## 2022-08-28 PROCEDURE — 93010 ELECTROCARDIOGRAM REPORT: CPT | Performed by: INTERNAL MEDICINE

## 2022-08-28 PROCEDURE — 96375 TX/PRO/DX INJ NEW DRUG ADDON: CPT

## 2022-08-28 PROCEDURE — 84484 ASSAY OF TROPONIN QUANT: CPT

## 2022-08-28 PROCEDURE — 85025 COMPLETE CBC W/AUTO DIFF WBC: CPT

## 2022-08-28 PROCEDURE — 6360000002 HC RX W HCPCS: Performed by: PHYSICIAN ASSISTANT

## 2022-08-28 PROCEDURE — 6370000000 HC RX 637 (ALT 250 FOR IP): Performed by: PHYSICIAN ASSISTANT

## 2022-08-28 PROCEDURE — 99285 EMERGENCY DEPT VISIT HI MDM: CPT

## 2022-08-28 PROCEDURE — 83880 ASSAY OF NATRIURETIC PEPTIDE: CPT

## 2022-08-28 PROCEDURE — 71045 X-RAY EXAM CHEST 1 VIEW: CPT

## 2022-08-28 PROCEDURE — 80053 COMPREHEN METABOLIC PANEL: CPT

## 2022-08-28 PROCEDURE — 70450 CT HEAD/BRAIN W/O DYE: CPT

## 2022-08-28 PROCEDURE — 2580000003 HC RX 258: Performed by: PHYSICIAN ASSISTANT

## 2022-08-28 RX ORDER — ACETAMINOPHEN 500 MG
1000 TABLET ORAL ONCE
Status: COMPLETED | OUTPATIENT
Start: 2022-08-28 | End: 2022-08-28

## 2022-08-28 RX ORDER — DEXAMETHASONE SODIUM PHOSPHATE 10 MG/ML
10 INJECTION, SOLUTION INTRAMUSCULAR; INTRAVENOUS ONCE
Status: COMPLETED | OUTPATIENT
Start: 2022-08-28 | End: 2022-08-28

## 2022-08-28 RX ADMIN — ACETAMINOPHEN 1000 MG: 500 TABLET ORAL at 15:16

## 2022-08-28 RX ADMIN — DEXAMETHASONE SODIUM PHOSPHATE 10 MG: 10 INJECTION, SOLUTION INTRAMUSCULAR; INTRAVENOUS at 15:16

## 2022-08-28 RX ADMIN — LEVETIRACETAM 1500 MG: 100 INJECTION, SOLUTION INTRAVENOUS at 15:20

## 2022-08-28 ASSESSMENT — PAIN - FUNCTIONAL ASSESSMENT: PAIN_FUNCTIONAL_ASSESSMENT: NONE - DENIES PAIN

## 2022-08-28 ASSESSMENT — PAIN SCALES - GENERAL: PAINLEVEL_OUTOF10: 8

## 2022-08-28 NOTE — ED PROVIDER NOTES
ATTENDING NOTE:    I discussed this patient's history and physical findings and reviewed the PA's findings with them, as well as performed an independent assessment and coordinated care with them. My additional findings are:    HISTORY OF PRESENT ILLNESS:  Chief Complaint   Patient presents with    Altered Mental Status     spouse states that he has been asking repetitive questions starting this morning; Aox4. Family states he has increased in confusion and shuffling since March, was suppose to have Mri 3 weeks ago   . Lea Jacobs is a 65 y.o. male who presents with 76-year-old gentleman who has been showing some signs of confusion over the last 5 months who is presenting with worsening confusion over the last week. PHYSICAL EXAM:  VITAL SIGNS:   ED Triage Vitals [08/28/22 1008]   Enc Vitals Group      BP (!) 152/93      Heart Rate 68      Resp 19      Temp 98.5 °F (36.9 °C)      Temp Source Oral      SpO2 96 %      Weight 222 lb (100.7 kg)      Height 6' (1.829 m)      Head Circumference       Peak Flow       Pain Score       Pain Loc       Pain Edu? Excl. in [address]? Vitals during ED course were reviewed and are as charted. Key Physical Exam Findings:    Constitutional: Minimal distress, Non-toxic appearance    Eyes:  Conjunctiva normal, No discharge, PERRL, EOMI    HENT: Normocephalic, Atraumatic    Neck: Supple, no stridor, no grossly visible or palpable masses    Cardiovascular: Regular rate and rhythm, No murmurs, No rubs, No gallops    Pulmonary/Chest:  Normal breath sounds, No respiratory distress or accessory muscle use, No wheezing, crackles or rhonchi. Abdomen:  Soft, nondistended and nonrigid, No tenderness or peritoneal signs, No masses, normal bowel sounds    Back:  No midline point tenderness, No paraspinous muscle tenderness.   No CVA tenderness    Extremities:  No gross deformities, no edema, no tenderness    Neurologic: Cranial nerves II through XII grossly intact as tested, normal motor function, Normal sensory function, No focal deficits    Skin:  Warm, Dry, No erythema, No rash, No cyanosis, No mottling    Lymphatic:  No lymphadenopathy in the following location(s): cervical    Psychiatric:  Alert and oriented to person and place      EKG Interpretation    Interpreted by emergency department physician    Rhythm: normal sinus   Rate: normal  Axis: normal  Ectopy: none  Conduction: normal  ST Segments: normal  T Waves: normal  Q Waves: none    Clinical Impression: Normal EKG          RADIOLOGY/PROCEDURES/LABS/MEDICATIONS ADMINISTERED:    I have reviewed and interpreted all of the currently available lab results from this visit (if applicable):  Results for orders placed or performed during the hospital encounter of 08/28/22   EKG 12 Lead   Result Value Ref Range    Ventricular Rate 61 BPM    Atrial Rate 61 BPM    P-R Interval 132 ms    QRS Duration 98 ms    Q-T Interval 404 ms    QTc Calculation (Bazett) 406 ms    P Axis 47 degrees    R Axis 4 degrees    T Axis 46 degrees    Diagnosis       *Suspect unspecified pacemaker failure  Normal sinus rhythm  Normal ECG  No previous ECGs available            ABNORMAL LABS:  Labs Reviewed   CBC WITH AUTO DIFFERENTIAL - Abnormal; Notable for the following components:       Result Value    MCH 32.4 (*)     Segs Relative 75.5 (*)     Lymphocytes % 9.3 (*)     Monocytes % 12.3 (*)     All other components within normal limits   COMPREHENSIVE METABOLIC PANEL - Abnormal; Notable for the following components:    Sodium 134 (*)     Glucose 106 (*)     All other components within normal limits   PROTIME/INR & PTT - Abnormal; Notable for the following components:    aPTT 24.5 (*)     All other components within normal limits   TROPONIN   BRAIN NATRIURETIC PEPTIDE   TSH   MAGNESIUM   URINALYSIS WITH MICROSCOPIC         IMAGING STUDIES ORDERED:  XR CHEST PORTABLE  CT HEAD WO CONTRAST  ED NURSING COMMUNICATION  PULSE OXIMETRY  SALINE LOCK IV  IP CONSULT TO NEUROSURGERY  IP CONSULT TO NEUROSURGERY    I have personally viewed the imaging studies. The radiologist interpretation is:   XR CHEST PORTABLE   Final Result   No acute cardiopulmonary process. 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 Recommend neurosurgical consultation and MR brain with and without contrast   for further evaluation. Findings were discussed with Dr. Zena Salinas at 12:18 pm on 8/28/2022. MEDICATIONS ADMINISTERED:  Medications   dexamethasone (PF) (DECADRON) injection 10 mg (10 mg IntraVENous Given 8/28/22 1516)   levETIRAcetam (KEPPRA) 1,500 mg in sodium chloride 0.9 % 100 mL IVPB (0 mg IntraVENous Stopped 8/28/22 1546)   acetaminophen (TYLENOL) tablet 1,000 mg (1,000 mg Oral Given 8/28/22 1516)         PLAN/ED COURSE:  Last Vitals: BP (!) 152/93   Pulse 68   Temp 98.5 °F (36.9 °C) (Oral)   Resp 19   Ht 6' (1.829 m)   Wt 222 lb (100.7 kg)   SpO2 96%   BMI 30.11 kg/m²     44-year-old male with large brain mass who has been exhibiting some progressively worsening confusion over the last 5 months. There is evidence to suggest mass-effect and some herniation. This likely has been a slowly developing process. Case was discussed with our neurosurgeon who is advised transfer. Patient will be transferred to [facility] for further evaluation and treatment. Please see the physician assistant's note for further details. Clinical Impression:  1. Altered mental status, unspecified altered mental status type    2. Brain mass        Disposition referral (if applicable):  No follow-up provider specified.     Disposition medications (if applicable):  Discharge Medication List as of 8/28/2022  8:31 PM          ED Provider Disposition

## 2022-08-28 NOTE — PROGRESS NOTES
Assumed care at [address]. Dr. Tala Sinclair spoke with Dr. Ed Kelly regarding this patient. Dr. Ed Kelly recommended patient be transferred to [facility] for further management.     CEM Oconnell PA-C Lewis Edmondson  Outcome: Progressing

## 2022-08-28 NOTE — ED NOTES
Accepted at [facility] ICU room number [id number]. [phone] for nurse to nurse report     Kurt Campoverde  08/28/22 [phone]

## 2022-08-31 NOTE — ED PROVIDER NOTES
[address] ENCOUNTER        Pt Name: Tran Silva  MRN: [id number]  Arabellagfroxanna 1945  Date of evaluation: 8/28/2022  Provider: ROSE England  PCP: Rodrigo Mccullough MD     I have seen and evaluated this patient with my supervising physicianDr Bias      Triage CHIEF COMPLAINT       Chief Complaint   Patient presents with    Altered Mental Status     spouse states that he has been asking repetitive questions starting this morning; Aox4. Family states he has increased in confusion and shuffling since March, was suppose to have Mri 3 weeks ago         [address] [place] [address]      Chief Complaint: Altered mental status, confusion, left-sided facial droop    Tran Silva is a 65 y.o. male who presents to the emergency department today with family members with intermittent episodes of confusion that is intensifying/worsening, left-sided facial droop. Feels like he was more confused at this morning so they brought him to the ER. They states that patient had a \"incident\" back in May. Symptomatic bradycardia and had a syncopal episode. This led to eventual pacemaker placement. He does have a history of prostate cancer and melanoma. Not anticoagulated. No history of stroke. Patient was seen by electrophysiologist on Friday, they ordered an outpatient MRI and neurologic evaluation, the family felt that his symptoms were declining so they brought him to the emerged part for evaluation. No new neurologic symptoms during the initial evaluation. No other significant metabolic issues. He has no active complaints. Nursing Notes were all reviewed and agreed with or any disagreements were addressed in the HPI.     REVIEW OF SYSTEMS     Constitutional:   Denies fever, chills, weight loss or weakness   HENT:   Denies sore throat or ear pain   Cardiovascular:   Denies chest pain, palpitations   Respiratory:  Denies cough or timolol (TIMOPTIC) 0.5 % ophthalmic solution INSTILL 1 DROP IN LEFT EYE DAILY IN THE MORNINGHistorical Med      FLUoxetine (PROZAC) 20 MG capsule TAKE 1 CAPSULE BY MOUTH once daily IN THE EVENINGHistorical Med      zolpidem (AMBIEN) 10 MG tablet Take 10 mg by mouth daily as needed. Historical Med      buPROPion (WELLBUTRIN XL) 150 MG extended release tablet Historical Med      venlafaxine (EFFEXOR XR) 150 MG extended release capsule Historical Med      DULoxetine (CYMBALTA) 30 MG extended release capsule TAKE 1 CAPSULE BY MOUTH DAILY AS DIRECTED, Disp-90 capsule, R-1Normal      losartan-hydroCHLOROthiazide (HYZAAR) 50-12.5 MG per tablet TAKE 1 TABLET BY MOUTH EVERY DAY, Disp-90 tablet, R-1Normal      linaclotide (LINZESS) 145 MCG capsule Take 1 capsule by mouth every morning (before breakfast), Disp-90 capsule, R-1Normal             ALLERGIES     Patient has no known allergies. FAMILYHISTORY       Family History   Problem Relation Age of Onset    Heart Disease Mother         d. age 68    Cancer Father         d. age 46 of lymphoma        SOCIAL HISTORY       Social History     Socioeconomic History    Marital status:      Spouse name: None    Number of children: None    Years of education: None    Highest education level: None   Tobacco Use    Smoking status: Former     Packs/day: 1.50     Years: 11.00     Pack years: 16.50     Types: Cigarettes     Start date: 7/15/1964     Quit date: 1975     Years since quittin.6    Smokeless tobacco: Never   Substance and Sexual Activity    Alcohol use: Yes     Comment: wine daily    Drug use: No       SCREENINGS           PHYSICAL EXAM       ED Triage Vitals [22 1008]   BP Temp Temp Source Heart Rate Resp SpO2 Height Weight   (!) 152/93 98.5 °F (36.9 °C) Oral 68 19 96 % 6' (1.829 m) 222 lb (100.7 kg)      Constitutional:  Well developed, Well nourished. No distress  HENT:  Normocephalic, Atraumatic, PERRL. EOMI. Sclera clear. Conjunctiva normal, No discharge. Neck/Lymphatics: supple, no JVD, no swollen nodes  Cardiovascular:   RRR,  no murmurs/rubs/gallops. Respiratory:   Nonlabored breathing. Normal breath sounds, No wheezing  Abdomen: Bowel sounds normal, Soft, No tenderness, no masses. Musculoskeletal:    There is no edema, asymmetry, or calf / thigh tenderness bilaterally. No cyanosis. No cool or pale-appearing limb. Distal cap refill and pulses intact bilateral upper and lower extremities  Bilateral upper and lower extremity ROM intact without pain or obvious deficit  Integument:   Warm, Dry  Neurologic:  Alert & oriented , questionable left-sided facial droop? .   Cranial nerves II through XII grossly intact. Normal gross motor coordination & motor strength bilateral upper and lower extremities  Sensation intact.   Psychiatric:  Affect normal, Mood normal.     ----------------------------------------------------------------------------------------------------------------------      NIH Stroke Scale  (Total score at bottom)      (1A) LOC  (Alert?):  0 - alert; keenly responsive    (1B) LOC Questions (Month / Age):  0 - answers both questions correctly     (1C) LOC Command  (Close eyes, squeeze my hands):  0 - performs both tasks correctly    (2) Best Gaze  (Eyes open-patient follows finger or face):  0 - normal    (3) Visual  (Introduce visual stimulus to patient's visual field quadrants):  0 - no visual loss    (4)  Facial palsy  (Show teeth, raise eyebrows and squeeze eyes shut):  1 - minor paralysis (flattened nasolabial fold, asymmetric on smiling)    (5) Motor arms  (Elevate extremity to 90° in sitting or 45° if supine -  score drift/movement)       (5A)  motor arm LEFT :  0 - no drift, limb holds 90 (or 45) degrees for full 10 seconds       (5B) motor arm RIGHT:   0 - no drift, limb holds 90 (or 45) degrees for full 10 seconds      (6) Motor legs  (Elevate extremity to 30° while supine and score drift/movement)       (6A)  motor leg LEFT:   0 - no drift; leg holds 30 degree position for full 5 seconds       (6B) motor leg RIGHT:  0 - no drift; leg holds 30 degree position for full 5 seconds      (7)  Limb Ataxia  (Finger-nose, heel-shin):  0 - absent    (8) Sensory  (face, arms trunk, and legs-compare side to side):  0 - normal; no sensory loss    (9)  Best language  (Name items, describes a picture, read sentence-see attached testing cards):  0 - no aphasia, normal    (10)  Dysarthria  (Evaluate speech clarity by patient repeating listed words):  0 - normal    (11)  Extinction and inattention  (can feel \"left, right, or both sides\" of face, arms, legs w/ closed eyes) (can see moving index finger in \"left, right, or both\":  0 - no abnormality        Total NIHSS:  1      ----------------------------------------------------------------------------------------------------------------------    DIAGNOSTIC RESULTS   LABS:    Labs Reviewed   CBC WITH AUTO DIFFERENTIAL - Abnormal; Notable for the following components:       Result Value    MCH 32.4 (*)     Segs Relative 75.5 (*)     Lymphocytes % 9.3 (*)     Monocytes % 12.3 (*)     All other components within normal limits   COMPREHENSIVE METABOLIC PANEL - Abnormal; Notable for the following components:    Sodium 134 (*)     Glucose 106 (*)     All other components within normal limits   PROTIME/INR & PTT - Abnormal; Notable for the following components:    aPTT 24.5 (*)     All other components within normal limits   TROPONIN   BRAIN NATRIURETIC PEPTIDE   TSH   MAGNESIUM   URINALYSIS WITH MICROSCOPIC       When ordered, only abnormal lab results are displayed. All other labs were within normal range or not returned as of this dictation. EKG: When ordered, EKG's are interpreted by the Emergency Department Physician in the absence of a cardiologist.  Please see their note for interpretation of EKG.     RADIOLOGY:   Non-plain film images such as CT, Ultrasound and MRI are read by the radiologist. Plain radiographic images are visualized and preliminarily interpreted by the  ED Provider with the below findings:    Interpretation perthe Radiologist below, if available at the time of this note:    XR CHEST PORTABLE   Final Result   No acute cardiopulmonary process. 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 Recommend neurosurgical consultation and MR brain with and without contrast   for further evaluation. Findings were discussed with Dr. Adilia Mcconnell at 12:18 pm on 8/28/2022. CT HEAD WO CONTRAST    Result Date: 8/28/2022  EXAMINATION: CT OF THE HEAD WITHOUT CONTRAST  8/28/2022 11:04 am TECHNIQUE: CT of the head was performed without the administration of intravenous contrast. Automated exposure control, iterative reconstruction, and/or weight based adjustment of the mA/kV was utilized to reduce the radiation dose to as low as reasonably achievable. COMPARISON: None. HISTORY: ORDERING SYSTEM PROVIDED HISTORY: Altered mental status, worsening confusion, intermittent left-sided facial droop from an incident back in May FINDINGS: BRAIN/VENTRICLES: There is a large cystic intra-axial mass involving the right frontal lobe, insula and basal ganglia extending caudally into the temporal lobe with localized surrounding subcortical and periventricular low attenuation. The mass measures approximately 8.7 x 4.9 cm in oblique transaxial dimensions. There is resultant marked compression of the right lateral ventricle, 11 mm leftward midline shift at the level of the septum pellucidum with subfalcine herniation and early suspected uncal herniation. No acute intracranial hemorrhage or extra-axial fluid collection. There is suspected early entrapment of the left temporal horn.  ORBITS: The visualized portion of the orbits demonstrate no acute abnormality. SINUSES: The visualized paranasal sinuses and mastoid air cells demonstrate no acute abnormality. SOFT TISSUES/SKULL:  No acute abnormality of the visualized skull or soft tissues. Large cystic intra-axial mass involving the right frontal lobe, insula, basal ganglia and temporal lobe with resultant subfalcine herniation, early uncal herniation and mild left ventricular entrapment. Recommend neurosurgical consultation and MR brain with and without contrast for further evaluation. Findings were discussed with Dr. Scarlet Johnson at 12:18 pm on 8/28/2022. XR CHEST PORTABLE    Result Date: 8/28/2022  EXAMINATION: ONE XRAY VIEW OF THE CHEST 8/28/2022 11:13 am COMPARISON: None. HISTORY: ORDERING SYSTEM PROVIDED HISTORY: AMS TECHNOLOGIST PROVIDED HISTORY: Reason for exam:->AMS Reason for Exam: AMS FINDINGS: The cardiomediastinal silhouette is normal in size. The lungs are clear. No pleural effusion or pneumothorax is present. No acute cardiopulmonary process. PROCEDURES   Unless otherwise noted below, none       CRITICAL CARE   CRITICAL CARE NOTE:   N/A    CONSULTS:  IP CONSULT TO NEUROSURGERY  IP CONSULT TO NEUROSURGERY      EMERGENCY DEPARTMENT COURSE and MDM:   Vitals:    Vitals:    08/28/22 1008   BP: (!) 152/93   Pulse: 68   Resp: 19   Temp: 98.5 °F (36.9 °C)   TempSrc: Oral   SpO2: 96%   Weight: 222 lb (100.7 kg)   Height: 6' (1.829 m)       Patient was given thefollowing medications:  Medications   dexamethasone (PF) (DECADRON) injection 10 mg (10 mg IntraVENous Given 8/28/22 1516)   levETIRAcetam (KEPPRA) 1,500 mg in sodium chloride 0.9 % 100 mL IVPB (0 mg IntraVENous Stopped 8/28/22 1546)   acetaminophen (TYLENOL) tablet 1,000 mg (1,000 mg Oral Given 8/28/22 1516)         Is this patient to be included in the SEP-1 Core Measure due to severe sepsis or septic shock?    No   Exclusion criteria - the patient is NOT to be included for SEP-1 Core Measure due to:  Infection is not suspected    MDM:  Patient presents as above. Emergent etiologies considered. Patient seen and examined. Work-up initiated secondary to presentation, physical exam findings, vital signs and medical chart review. In brief, 78-year-old male presented emergency department today with increasing confusion, facial droop, recent evaluation for neurologic evaluation. Saw electrophysiology secondary to a syncopal episode and pacemaker placement, they recommended further neurological evaluation, MRI as an outpatient. Presenting today because having worsening confusion, altered mental status. He denies stroke scale is 1, he has the faintest of left-sided facial droop. Otherwise neurologically intact. A work-up ensued. Patient was found to have a large brain mass. After talking with family they would like to go to [facility]. We did reach out to [facility], we spoke to the transfer center, patient was excepted at the [facility] in their neuro ICU. We will be sent there via ambulance for further evaluation and care. Patient was seen evaluate attending physician, see their note for any further details. CLINICAL IMPRESSION      1. Altered mental status, unspecified altered mental status type    2. Brain mass        DISPOSITION/PLAN   DISPOSITION Decision To Transfer 08/28/2022 04:03:23 PM       (Please note that portions ofthis note were completed with a voice recognition program.  Efforts were made to edit the dictations but occasionally words are mis-transcribed. )    ROSE Dailey (electronically signed)            ROSE Marrero  08/31/22 Luz Elena Calle

## 2022-09-25 ASSESSMENT — ENCOUNTER SYMPTOMS
EYE PAIN: 0
VOMITING: 0
PHOTOPHOBIA: 0
COLOR CHANGE: 0
DIARRHEA: 0
WHEEZING: 0
NAUSEA: 0
ABDOMINAL PAIN: 0
CHEST TIGHTNESS: 0
SHORTNESS OF BREATH: 0
CONSTIPATION: 0
BLOOD IN STOOL: 0
BACK PAIN: 0
COUGH: 0

## 2022-11-09 ENCOUNTER — TELEPHONE (OUTPATIENT)
Dept: CARDIOLOGY CLINIC | Age: 77
End: 2022-11-09

## 2022-11-09 NOTE — TELEPHONE ENCOUNTER
Patients daughter advised that per Dr Charleen Cano he can take Riddilen with him having a pacemaker.  She voiced understanding

## 2022-11-09 NOTE — TELEPHONE ENCOUNTER
's at [facility] want to prescribe Riddilen to bring up energy levels. They are wanting to know if this is Ok because of the pacemaker.

## 2023-03-03 ENCOUNTER — HOSPITAL ENCOUNTER (OUTPATIENT)
Dept: CT IMAGING | Age: 78
Discharge: HOME | End: 2023-03-03
Payer: MEDICARE

## 2023-03-03 DIAGNOSIS — S06.5XAA SUBDURAL HEMATOMA: ICD-10-CM

## 2023-03-03 PROCEDURE — 70450 CT HEAD/BRAIN W/O DYE: CPT

## 2023-03-06 ENCOUNTER — TRANSCRIBE ORDERS (OUTPATIENT)
Dept: ADMINISTRATIVE | Age: 78
End: 2023-03-06

## 2023-03-06 DIAGNOSIS — C71.9 GLIOBLASTOMA (HCC): ICD-10-CM

## 2023-03-06 DIAGNOSIS — S06.5XAA SUBDURAL HEMATOMA: Primary | ICD-10-CM

## 2023-03-07 ENCOUNTER — HOSPITAL ENCOUNTER (OUTPATIENT)
Dept: CT IMAGING | Age: 78
Discharge: HOME | End: 2023-03-07
Payer: MEDICARE

## 2023-03-07 DIAGNOSIS — S06.5XAA SUBDURAL HEMATOMA: ICD-10-CM

## 2023-03-07 DIAGNOSIS — C71.9 GLIOBLASTOMA (HCC): ICD-10-CM

## 2023-03-07 PROCEDURE — 70450 CT HEAD/BRAIN W/O DYE: CPT

## 2023-03-15 ENCOUNTER — APPOINTMENT (OUTPATIENT)
Dept: CT IMAGING | Age: 78
End: 2023-03-15
Payer: MEDICARE

## 2023-03-15 ENCOUNTER — HOSPITAL ENCOUNTER (EMERGENCY)
Age: 78
Discharge: ANOTHER HOSPITAL | End: 2023-03-15
Attending: EMERGENCY MEDICINE
Payer: MEDICARE

## 2023-03-15 ENCOUNTER — APPOINTMENT (OUTPATIENT)
Dept: GENERAL RADIOLOGY | Age: 78
End: 2023-03-15
Payer: MEDICARE

## 2023-03-15 VITALS
SYSTOLIC BLOOD PRESSURE: 123 MMHG | WEIGHT: 220 LBS | TEMPERATURE: 97.8 F | RESPIRATION RATE: 15 BRPM | HEART RATE: 62 BPM | OXYGEN SATURATION: 98 % | BODY MASS INDEX: 29.84 KG/M2 | DIASTOLIC BLOOD PRESSURE: 66 MMHG

## 2023-03-15 DIAGNOSIS — R41.82 ALTERED MENTAL STATUS, UNSPECIFIED ALTERED MENTAL STATUS TYPE: Primary | ICD-10-CM

## 2023-03-15 DIAGNOSIS — R53.1 GENERALIZED WEAKNESS: ICD-10-CM

## 2023-03-15 LAB
ALBUMIN SERPL-MCNC: 3.7 GM/DL (ref 3.4–5)
ALP BLD-CCNC: 53 IU/L (ref 40–129)
ALT SERPL-CCNC: 20 U/L (ref 10–40)
ANION GAP SERPL CALCULATED.3IONS-SCNC: 4 MMOL/L (ref 4–16)
AST SERPL-CCNC: 15 IU/L (ref 15–37)
BASOPHILS ABSOLUTE: 0 K/CU MM
BASOPHILS RELATIVE PERCENT: 0.2 % (ref 0–1)
BILIRUB SERPL-MCNC: 0.5 MG/DL (ref 0–1)
BILIRUBIN URINE: NEGATIVE MG/DL
BLOOD, URINE: NEGATIVE
BUN SERPL-MCNC: 23 MG/DL (ref 6–23)
CALCIUM SERPL-MCNC: 9 MG/DL (ref 8.3–10.6)
CHLORIDE BLD-SCNC: 96 MMOL/L (ref 99–110)
CHP ED QC CHECK: YES
CLARITY: CLEAR
CO2: 28 MMOL/L (ref 21–32)
COLOR: YELLOW
COMMENT UA: NORMAL
CREAT SERPL-MCNC: 0.8 MG/DL (ref 0.9–1.3)
DIFFERENTIAL TYPE: ABNORMAL
EOSINOPHILS ABSOLUTE: 0.1 K/CU MM
EOSINOPHILS RELATIVE PERCENT: 1 % (ref 0–3)
GFR SERPL CREATININE-BSD FRML MDRD: >60 ML/MIN/1.73M2
GLUCOSE BLD-MCNC: 116 MG/DL
GLUCOSE BLD-MCNC: 116 MG/DL (ref 70–99)
GLUCOSE SERPL-MCNC: 111 MG/DL (ref 70–99)
GLUCOSE, URINE: NEGATIVE MG/DL
HCT VFR BLD CALC: 39.6 % (ref 42–52)
HEMOGLOBIN: 13.6 GM/DL (ref 13.5–18)
IMMATURE NEUTROPHIL %: 0.8 % (ref 0–0.43)
INR BLD: 0.97 INDEX
KETONES, URINE: NEGATIVE MG/DL
LEUKOCYTE ESTERASE, URINE: NEGATIVE
LYMPHOCYTES ABSOLUTE: 0.7 K/CU MM
LYMPHOCYTES RELATIVE PERCENT: 13.4 % (ref 24–44)
MCH RBC QN AUTO: 33.6 PG (ref 27–31)
MCHC RBC AUTO-ENTMCNC: 34.3 % (ref 32–36)
MCV RBC AUTO: 97.8 FL (ref 78–100)
MONOCYTES ABSOLUTE: 0.7 K/CU MM
MONOCYTES RELATIVE PERCENT: 12.8 % (ref 0–4)
NITRITE URINE, QUANTITATIVE: NEGATIVE
PDW BLD-RTO: 13.7 % (ref 11.7–14.9)
PH, URINE: 7 (ref 5–8)
PLATELET # BLD: 142 K/CU MM (ref 140–440)
PMV BLD AUTO: 9.3 FL (ref 7.5–11.1)
POTASSIUM SERPL-SCNC: 4.3 MMOL/L (ref 3.5–5.1)
PROTEIN UA: NEGATIVE MG/DL
PROTHROMBIN TIME: 12.6 SECONDS (ref 11.7–14.5)
RBC # BLD: 4.05 M/CU MM (ref 4.6–6.2)
SEGMENTED NEUTROPHILS ABSOLUTE COUNT: 3.7 K/CU MM
SEGMENTED NEUTROPHILS RELATIVE PERCENT: 71.8 % (ref 36–66)
SODIUM BLD-SCNC: 128 MMOL/L (ref 136–145)
SPECIFIC GRAVITY UA: 1.01 (ref 1–1.03)
TOTAL IMMATURE NEUTOROPHIL: 0.04 K/CU MM
TOTAL PROTEIN: 6.2 GM/DL (ref 6.4–8.2)
TROPONIN T: <0.01 NG/ML
UROBILINOGEN, URINE: 0.2 MG/DL (ref 0.2–1)
WBC # BLD: 5.2 K/CU MM (ref 4–10.5)

## 2023-03-15 PROCEDURE — 85025 COMPLETE CBC W/AUTO DIFF WBC: CPT

## 2023-03-15 PROCEDURE — 80053 COMPREHEN METABOLIC PANEL: CPT

## 2023-03-15 PROCEDURE — 70498 CT ANGIOGRAPHY NECK: CPT

## 2023-03-15 PROCEDURE — 85610 PROTHROMBIN TIME: CPT

## 2023-03-15 PROCEDURE — 71045 X-RAY EXAM CHEST 1 VIEW: CPT

## 2023-03-15 PROCEDURE — 81003 URINALYSIS AUTO W/O SCOPE: CPT

## 2023-03-15 PROCEDURE — 6360000004 HC RX CONTRAST MEDICATION: Performed by: EMERGENCY MEDICINE

## 2023-03-15 PROCEDURE — 82962 GLUCOSE BLOOD TEST: CPT

## 2023-03-15 PROCEDURE — 93005 ELECTROCARDIOGRAM TRACING: CPT | Performed by: EMERGENCY MEDICINE

## 2023-03-15 PROCEDURE — 84484 ASSAY OF TROPONIN QUANT: CPT

## 2023-03-15 PROCEDURE — 2580000003 HC RX 258: Performed by: EMERGENCY MEDICINE

## 2023-03-15 PROCEDURE — 99285 EMERGENCY DEPT VISIT HI MDM: CPT

## 2023-03-15 PROCEDURE — 70450 CT HEAD/BRAIN W/O DYE: CPT

## 2023-03-15 RX ORDER — SODIUM CHLORIDE 0.9 % (FLUSH) 0.9 %
20 SYRINGE (ML) INJECTION
Status: COMPLETED | OUTPATIENT
Start: 2023-03-15 | End: 2023-03-15

## 2023-03-15 RX ADMIN — IOPAMIDOL 100 ML: 755 INJECTION, SOLUTION INTRAVENOUS at 09:28

## 2023-03-15 RX ADMIN — SODIUM CHLORIDE, PRESERVATIVE FREE 20 ML: 5 INJECTION INTRAVENOUS at 09:28

## 2023-03-15 NOTE — ED NOTES
Repositioned pt so he can drink his coffee, call light within reach, family at bedside.       Darrell Marie RN  03/15/23 [id number]

## 2023-03-15 NOTE — ED NOTES
Updated vitals, and updated pt and family [facility] E[facility]. Pt resting drinking coffee, call light within reach. no swallowing issues.  Swallow screen prior to coffee , pass     Vikki Mederos RN  03/15/23 [address] [facility], RN  03/15/23 [facility], RN  03/15/23 [id number]

## 2023-03-15 NOTE — ED NOTES
Wife reports was riding in the car this am on the way to eye doctor and pt began mumbling. Pt alert and oriented x 4 on arrival. Pt answers all questions appropriately. Pt needed help getting out of car , wife reports normal left sided weakness from brain cancer.  Wife reports increased weakness this am. [facility] score 0     Viviana Harrell, RN  03/15/23 [address] RN  03/15/23 [id number]

## 2023-03-15 NOTE — ED PROVIDER NOTES
EMERGENCY DEPARTMENT ENCOUNTER      CHIEF COMPLAINT:   ***    HPI: Fabian Arnold is a 77 y.o. male who presents ***    REVIEW OF SYSTEMS:   Constitutional:  Denies fever or chills  Eyes:  Denies change in visual acuity  HENT:  Denies nasal congestion or sore throat  Respiratory:  Denies cough or shortness of breath  Cardiovascular:  Denies chest pain or edema  GI:  Denies abdominal pain, nausea, vomiting, bloody stools or diarrhea  :  Denies dysuria  Musculoskeletal:  Denies back pain or joint pain  Integument:  Denies rash  Neurologic:  Denies headache, focal weakness or sensory changes  \"Remaining review of systems reviewed and negative. I have reviewed the nursing triage documentation and agree unless otherwise noted below. \"      PAST MEDICAL HISTORY:   Past Medical History:   Diagnosis Date    Adenomatous polyp of colon 1/2001    Mildly Dysplastic Tubular Adenoma    Basal cell carcinoma of cheek 12/2012    left temple; Moh's; [facility]    Basal cell carcinoma of skin of trunk 11/2010    front of neck; [facility]    Hyperlipidemia 2015    lipids are OK; but [place] 12%    Hypertension 10/2014    Melanoma in situ of scalp ([facility] [address] 12/2012    Vertex scalp; [place] level III    Mood disorder ([facility] [address]     Irritabilitiy    Ocular histoplasmosis syndrome of both eyes 2000    left eye greater than right eye-S/P laser treatment in [place]; [facility] ; Dr [facility]    Sleep disorder     chronic       CURRENT MEDICATIONS:   Home medications reviewed.     SURGICAL HISTORY:   Past Surgical History:   Procedure Laterality Date    CARPAL TUNNEL RELEASE Right 07/02/2008    COLONOSCOPY  2015    COLONOSCOPY  2011    COLONOSCOPY  12/07/2018    polyps 4, call next week for pathology results    COLONOSCOPY N/A 12/7/2018    COLONOSCOPY POLYPECTOMY SNARE/COLD BIOPSY performed by Tiffanie Pa MD at [facility] N/A 7/8/2021    COLONOSCOPY POLYPECTOMY SNARE/COLD BIOPSY performed by Tiffanie Pa MD at [address] [facility] ARTHROSCOPY Right 2015    Dr Rauhl Medrano      L4-L5    [address]  2012    left temple: [address]; [facility]    SKIN CANCER EXCISION  2012    Melanoma; scalp       FAMILY HISTORY:   Family History   Problem Relation Age of Onset    Heart Disease Mother         d. age 68    Cancer Father         d. age 46 of lymphoma       SOCIAL HISTORY:   Social History     Socioeconomic History    Marital status:      Spouse name: Not on file    Number of children: Not on file    Years of education: Not on file    Highest education level: Not on file   Occupational History    Not on file   Tobacco Use    Smoking status: Former     Packs/day: 1.50     Years: 11.00     Pack years: 16.50     Types: Cigarettes     Start date: 7/15/1964     Quit date: 1975     Years since quittin.2    Smokeless tobacco: Never   Substance and Sexual Activity    Alcohol use: Yes     Comment: wine daily    Drug use: No    Sexual activity: Not on file   Other Topics Concern    Not on file   Social History Narrative    Not on file     Social Determinants of Health     Financial Resource Strain: Not on file   Food Insecurity: Not on file   Transportation Needs: Not on file   Physical Activity: Not on file   Stress: Not on file   Social Connections: Not on file   Intimate Partner Violence: Not on file   Housing Stability: Not on file       ALLERGIES: Patient has no known allergies. PHYSICAL EXAM:  VITAL SIGNS:   ED Triage Vitals [03/15/23 0808]   Enc Vitals Group      BP (!) 140/88      Heart Rate 65      Resp 16      Temp 97.8 °F (36.6 °C)      Temp src       SpO2 99 %      Weight 220 lb (99.8 kg)      Height       Head Circumference       Peak Flow       Pain Score       Pain Loc       Pain Edu? Excl. in [address]?       Constitutional:  Non-toxic appearance  HENT: Normocephalic, Atraumatic, Bilateral external ears normal, Oropharynx moist, No oral exudates, Nose normal.  Eyes:  PERRL, EOMI, Conjunctiva normal, No discharge. Neck: Normal range of motion, No tenderness, Supple, No stridor, No lymphadenopathy. Cardiovascular:  Normal heart rate, Normal rhythm  Pulmonary/Chest:  Normal breath sounds, No respiratory distress, No wheezing  Abdomen: Bowel sounds normal, Soft, No tenderness, No masses, No pulsatile masses  Back:  No tenderness, No CVA tenderness  Extremities:  Normal range of motion, Intact distal pulses, No edema, No tenderness  Neurologic:  Alert & oriented x 3, Normal motor function, Sensation intact to light touch throughout, No focal deficits  Skin:  Warm, Dry, No erythema, No rash      EKG Interpretation  Interpreted by me  Compared to 8/28/2022  Rhythm: normal sinus  Rate: normal 66  Axis: normal  Ectopy: none  Conduction: normal  ST Segments: no acute change  T Waves: no acute change  Clinical Impression: normal sinus rhythm    Cardiac Monitor Strip Interpretation  Interpreted by me  Monitor strip interpreted for greater than 10 seconds  Rhythm: normal sinus  Rate: normal  Ectopy: none  ST Segments: normal      Radiology / Procedures:  ***      ED COURSE & MEDICAL DECISION MAKING:  Nakul Stovall is a 77 y.o. male who presents to the Emergency Department complaining of ***. Please see HPI for details. {History from (Optional):25482}    {Limitations to history (Optional):02757}    Chronic conditions affecting care: ***    {Social Determinants (Optional):07217}    {Records Reviewed (Optional):70766}    On exam, the patient is afebrile and nontoxic appearing. *** is hemodynamically stable and neurologically intact. Physical exam is significant for ***. EKG shows a normal sinus rhythm with no ST elevation or depression. Labs are obtained and are significant for ***. Imaging was obtained and shows ***. The patient was treated with medications as below and felt significantly better.      Patient was given the following medications:  Medications - No data to display    Independent Imaging Interpretation by me: ***    Diagnosis and Differential Diagnosis:  I suspect that the patient has ***. I have a low suspicion for ***. Disposition Considerations (tests considered but not done, Shared Decision Making, Pt Expectation of Test or Tx.): ***  {Escalation of care, including admission/OBS considered:23303}    I feel that the patient is stable for outpatient management with follow up in 2-3 days. Prescriptions for ***will be prescribed as below. The patient is given return precautions. The patient verbalized understanding, was agreeable with plan, and the patient was discharged home in stable condition. I recommended admission to the hospital and the patient was agreeable. I discussed the case with Dr. Seymour Vasquez who will admit the patient for further treatment and care. The patient is currently in stable condition awaiting admission. I am the Primary Clinician of Record. Clinical Impression:  No diagnosis found. Disposition referral (if applicable):  No follow-up provider specified. Disposition medications (if applicable):  New Prescriptions    No medications on file         Comment: Please note this report has been produced using speech recognition software and may contain errors related to that system including errors in grammar, punctuation, and spelling, as well as words and phrases that may be inappropriate. If there are any questions or concerns please feel free to contact the dictating provider for clarification. not enlarged. The lungs are   underinflated, resulting in vascular crowding and subsegmental atelectasis. Bibasilar opacities favor atelectasis. No discrete pleural effusion or   pneumothorax. Osseous structures are grossly unchanged. Labs Reviewed   CBC WITH AUTO DIFFERENTIAL - Abnormal; Notable for the following components:       Result Value    RBC 4.05 (*)     Hematocrit 39.6 (*)     MCH 33.6 (*)     Segs Relative 71.8 (*)     Lymphocytes % 13.4 (*)     Monocytes % 12.8 (*)     Immature Neutrophil % 0.8 (*)     All other components within normal limits   COMPREHENSIVE METABOLIC PANEL W/ REFLEX TO MG FOR LOW K - Abnormal; Notable for the following components:    Sodium 128 (*)     Chloride 96 (*)     Creatinine 0.8 (*)     Glucose 111 (*)     Total Protein 6.2 (*)     All other components within normal limits   POCT GLUCOSE - Abnormal; Notable for the following components:    POC Glucose 116 (*)     All other components within normal limits   POCT GLUCOSE - Normal   TROPONIN   PROTIME-INR   URINALYSIS       ED COURSE & MEDICAL DECISION MAKING:  Cathy Martines is a 77 y.o. male with a past medical history of hypertension, hyperlipidemia and glioblastoma who presents to the emergency department for evaluation after he became generally weak and had mumbling speech in the car on the way to the eye doctor this morning. Family drove him directly here. Family at bedside help provide the history. Wife states that he seemed to have more weakness than usual this morning. He has chronic left-sided weakness since being diagnosed with glioblastoma and having surgery for it last August.  It seemed to be worse this morning. While in the car, he started having mumbling speech. This has since resolved. He currently has no complaints. Denies fevers, chills, chest pain, shortness of breath, nausea, vomiting or any other complaints.     History from : Patient and Family at bedside    Limitations to history :

## 2023-03-15 NOTE — ED NOTES
Pt changed into a gown. Family at bedside. Dr Caroline Lester at bedside.       Lang Mena RN  03/15/23 3230       Lang Mena RN  03/15/23 4521

## 2023-03-15 NOTE — ED NOTES
Assisted pt to use urinal, pants removed , placed in bag. Call light within reach, family at bedside.      Krystal Calvo RN  03/15/23 [id number]

## 2023-03-17 LAB
EKG ATRIAL RATE: 66 BPM
EKG DIAGNOSIS: NORMAL
EKG P AXIS: 22 DEGREES
EKG P-R INTERVAL: 150 MS
EKG Q-T INTERVAL: 394 MS
EKG QRS DURATION: 88 MS
EKG QTC CALCULATION (BAZETT): 413 MS
EKG R AXIS: -2 DEGREES
EKG T AXIS: 67 DEGREES
EKG VENTRICULAR RATE: 66 BPM

## 2023-03-17 PROCEDURE — 93010 ELECTROCARDIOGRAM REPORT: CPT | Performed by: INTERNAL MEDICINE

## 2023-03-18 ENCOUNTER — HOSPITAL ENCOUNTER (INPATIENT)
Age: 78
DRG: 057 | End: 2023-03-18
Attending: PHYSICAL MEDICINE & REHABILITATION | Admitting: PHYSICAL MEDICINE & REHABILITATION
Payer: MEDICARE

## 2023-03-18 PROBLEM — I62.00 SUBDURAL HEMATOMA, NONTRAUMATIC (HCC): Status: ACTIVE | Noted: 2023-03-18

## 2023-03-18 PROBLEM — C71.9 GLIOBLASTOMA (HCC): Status: ACTIVE | Noted: 2023-03-18

## 2023-03-18 PROCEDURE — 6370000000 HC RX 637 (ALT 250 FOR IP): Performed by: PHYSICAL MEDICINE & REHABILITATION

## 2023-03-18 PROCEDURE — 1280000000 HC REHAB R&B

## 2023-03-18 RX ORDER — DOCUSATE SODIUM 100 MG/1
100 CAPSULE, LIQUID FILLED ORAL EVERY 12 HOURS
Status: DISCONTINUED | OUTPATIENT
Start: 2023-03-18 | End: 2023-03-19

## 2023-03-18 RX ORDER — METHYLPHENIDATE HYDROCHLORIDE 10 MG/1
10 TABLET ORAL 2 TIMES DAILY
COMMUNITY

## 2023-03-18 RX ORDER — LOSARTAN POTASSIUM 25 MG/1
50 TABLET ORAL DAILY
Status: DISCONTINUED | OUTPATIENT
Start: 2023-03-19 | End: 2023-03-28 | Stop reason: HOSPADM

## 2023-03-18 RX ORDER — FAMOTIDINE 10 MG
10 TABLET ORAL 2 TIMES DAILY
COMMUNITY

## 2023-03-18 RX ORDER — POLYETHYLENE GLYCOL 3350 17 G/17G
17 POWDER, FOR SOLUTION ORAL DAILY PRN
Status: DISCONTINUED | OUTPATIENT
Start: 2023-03-18 | End: 2023-03-22

## 2023-03-18 RX ORDER — SODIUM CHLORIDE 1000 MG
1 TABLET, SOLUBLE MISCELLANEOUS 3 TIMES DAILY
COMMUNITY

## 2023-03-18 RX ORDER — SODIUM CHLORIDE 1000 MG
1 TABLET, SOLUBLE MISCELLANEOUS
Status: DISCONTINUED | OUTPATIENT
Start: 2023-03-18 | End: 2023-03-28 | Stop reason: HOSPADM

## 2023-03-18 RX ORDER — AMOXICILLIN 250 MG
1 CAPSULE ORAL DAILY
COMMUNITY

## 2023-03-18 RX ORDER — ACETAMINOPHEN 325 MG/1
650 TABLET ORAL EVERY 4 HOURS PRN
Status: DISCONTINUED | OUTPATIENT
Start: 2023-03-18 | End: 2023-03-19 | Stop reason: SDUPTHER

## 2023-03-18 RX ORDER — FLUOXETINE 10 MG/1
10 CAPSULE ORAL DAILY
Status: DISCONTINUED | OUTPATIENT
Start: 2023-03-19 | End: 2023-03-28 | Stop reason: HOSPADM

## 2023-03-18 RX ORDER — FAMOTIDINE 20 MG/1
20 TABLET, FILM COATED ORAL 2 TIMES DAILY
Status: DISCONTINUED | OUTPATIENT
Start: 2023-03-18 | End: 2023-03-28 | Stop reason: HOSPADM

## 2023-03-18 RX ORDER — ACETAMINOPHEN 325 MG/1
650 TABLET ORAL EVERY 4 HOURS PRN
Status: DISCONTINUED | OUTPATIENT
Start: 2023-03-18 | End: 2023-03-18

## 2023-03-18 RX ORDER — LEVETIRACETAM 500 MG/1
500 TABLET ORAL 2 TIMES DAILY
Status: DISCONTINUED | OUTPATIENT
Start: 2023-03-18 | End: 2023-03-28 | Stop reason: HOSPADM

## 2023-03-18 RX ORDER — LEVETIRACETAM 500 MG/1
500 TABLET ORAL 2 TIMES DAILY
COMMUNITY

## 2023-03-18 RX ORDER — METHYLPHENIDATE HYDROCHLORIDE 5 MG/1
10 TABLET ORAL 2 TIMES DAILY WITH MEALS
Status: DISCONTINUED | OUTPATIENT
Start: 2023-03-18 | End: 2023-03-19

## 2023-03-18 RX ORDER — DOCUSATE SODIUM 100 MG/1
100 CAPSULE, LIQUID FILLED ORAL 2 TIMES DAILY
COMMUNITY

## 2023-03-18 RX ORDER — LANOLIN ALCOHOL/MO/W.PET/CERES
3 CREAM (GRAM) TOPICAL DAILY
COMMUNITY

## 2023-03-18 RX ORDER — LANOLIN ALCOHOL/MO/W.PET/CERES
6 CREAM (GRAM) TOPICAL NIGHTLY
Status: DISCONTINUED | OUTPATIENT
Start: 2023-03-18 | End: 2023-03-28 | Stop reason: HOSPADM

## 2023-03-18 RX ORDER — DEXAMETHASONE SODIUM PHOSPHATE 4 MG/ML
4 INJECTION, SOLUTION INTRA-ARTICULAR; INTRALESIONAL; INTRAMUSCULAR; INTRAVENOUS; SOFT TISSUE DAILY
Status: DISCONTINUED | OUTPATIENT
Start: 2023-03-19 | End: 2023-03-19

## 2023-03-18 RX ORDER — POLYETHYLENE GLYCOL 3350 17 G/17G
17 POWDER, FOR SOLUTION ORAL DAILY PRN
Status: DISCONTINUED | OUTPATIENT
Start: 2023-03-18 | End: 2023-03-18

## 2023-03-18 RX ADMIN — SODIUM CHLORIDE 1 G: 1 TABLET ORAL at 13:00

## 2023-03-18 RX ADMIN — SODIUM CHLORIDE 1 G: 1 TABLET ORAL at 17:32

## 2023-03-18 RX ADMIN — Medication 6 MG: at 20:21

## 2023-03-18 RX ADMIN — DOCUSATE SODIUM 100 MG: 100 CAPSULE, LIQUID FILLED ORAL at 20:21

## 2023-03-18 RX ADMIN — LEVETIRACETAM 500 MG: 500 TABLET, FILM COATED ORAL at 20:21

## 2023-03-18 RX ADMIN — FAMOTIDINE 20 MG: 20 TABLET ORAL at 20:21

## 2023-03-18 NOTE — PROGRESS NOTES
from getting my medications  [] B.  Yes, it has kept me from non-medical meetings, appointments, work, or from getting things that I need  [x] C.  No  [] X. Patient unable to respond  [] Y. Patient declines to respond    Hearing  Ability to hear (with hearing aid or hearing appliances if normally used)  [x]  0. Adequate - no difficulty in normal conversation, social interaction, listening to TV  []  1. Minimal difficulty - difficulty in some environments (e.g. when person speaks softly or setting is noisy)  []  2. Moderate difficulty - speaker has to increase volume and speak distinctly   []  3. Highly impaired - absence of useful hearing    Vision  Ability to see in adequate light (with glasses or other visual appliances)  [x]  0. Adequate - sees fine detail, such as regular print in newspapers/books  []  1. Impaired - sees large print, but not regular print in newspapers/books  []  2. Moderately impaired - limited vision; not able to see newspaper headlines but can identify objects  []  3. Highly impaired - object identification in question, but eyes appear to follow objects  []  4. Severely impaired - no vision or sees only light, colors, or shapes; eyes do not appear to follow objects    Health Literacy  \"How often do you need to have someone help you when you read instructions, pamphlets, or other written material from your doctor or pharmacy? \"  [x]  0. Never  []  1. Rarely  []  2. Sometimes  []  3. Often  []  4. Always  []  8. Patient unable to respond    Signs and Symptoms of Delirium  A. Acute Onset Mental Status Change - Is there evidence of an acute change in mental status from the patient's baseline? [x] 0. No  [] 1. Yes    B. Inattention - Did the patient have difficulty focusing attention, for example being easily distractible or having difficulty keeping track of what was being said? [x]  0. Behavior not present  []  1. Behavior continuously present, does not fluctuate  []  2. \"social isolation\" question and continue**     Trouble falling or staying asleep, or sleeping too much   [] 0. No  [] 1. Yes  [] 9. No Response [] 0. Never or one day  [] 1.  2-6 days (several days)  [] 2.  7-11 days (half or more of days)  [] 3.  12-14 days (nearly every day   Feeling tired or having little energy   [] 0. No  [] 1. Yes  [] 9. No Response [] 0. Never or one day  [] 1.  2-6 days (several days)  [] 2.  7-11 days (half or more of days)  [] 3.  12-14 days (nearly every day   Poor appetite or overeating     [] 0. No  [] 1. Yes  [] 9. No Response [] 0. Never or one day  [] 1.  2-6 days (several days)  [] 2.  7-11 days (half or more of days)  [] 3.  12-14 days (nearly every day   Feeling bad about yourself - or that you are a failure or have let yourself or your family down   [] 0. No  [] 1. Yes  [] 9. No Response [] 0. Never or one day  [] 1.  2-6 days (several days)  [] 2.  7-11 days (half or more of days)  [] 3.  12-14 days (nearly every day   Trouble concentrating on things, such as reading the newspaper or watching television   [] 0. No  [] 1. Yes  [] 9. No Response [] 0. Never or one day  [] 1.  2-6 days (several days)  [] 2.  7-11 days (half or more of days)  [] 3.  12-14 days (nearly every day   Moving or speaking so slowly that other people could have noticed. Or the opposite- being so fidgety or restless that you have been moving around a lot more than usual.   [] 0. No  [] 1. Yes  [] 9. No Response [] 0. Never or one day  [] 1.  2-6 days (several days)  [] 2.  7-11 days (half or more of days)  [] 3.  12-14 days (nearly every day   Thoughts that you would be better off dead, or of hurting yourself in some way.   [] 0. No  [] 1. Yes  [] 9. No Response [] 0. Never or one day  [] 1.  2-6 days (several days)  [] 2.  7-11 days (half or more of days)  [] 3.  12-14 days (nearly every day     Social Isolation  \"How often do you feel lonely or isolated from those around you? \"  [x] 0.

## 2023-03-18 NOTE — PROGRESS NOTES
4 Eyes Skin Assessment     NAME:  Jaida Franklin  YOB: 1945  MEDICAL RECORD NUMBER:  [id number]    The patient is being assessed for  Admission    I agree that One RN has performed a thorough Head to Toe Skin Assessment on the patient. ALL assessment sites listed below have been assessed. Areas assessed by both nurses:    Head, Face, Ears, Shoulders, Back, Chest, Arms, Elbows, Hands, Sacrum. Buttock, Coccyx, Ischium, Legs. Feet and Heels, and Other non        Does the Patient have a Wound?  C/o of a tender area on tail bone reddened a blachable- coccyx barrier applied       Nakul Prevention initiated by RN: NA   Wound Care Orders initiated by RN: NA    Pressure Injury (Stage 3,4, Unstageable, DTI, NWPT, and Complex wounds) if present, place referral order by RN under : NA    New and Established Ostomies, if present place, referral order under : NA      Nurse 1 eSignature: Electronically signed by Ehsan Vinson RN on 3/18/23 at 12:21 PM EDT    **SHARE this note so that the co-signing nurse can place an eSignature**    Nurse 2 eSignature: Electronically signed by Rodríguez Hurd RN on 3/18/23 at 12:45 PM EDT

## 2023-03-18 NOTE — PLAN OF CARE
ARU Interdisciplinary Plan of Care (IPOC)  [facility]  1st [address]  Kathrin Moreau, [address]  [phone]  Fax: [phone]    Cl Pop    : 1945  Acct #: [de-identified]  MRN: [id number]   PHYSICIAN:  Rocio Randolph MD  Primary Active Problems:   Active Hospital Problems    Diagnosis Date Noted    Glioblastoma ([address] [C71.9] 2023     Priority: Medium    Subdural hematoma, nontraumatic ([address] [I62.00] 2023     Priority: Medium     Rehabilitation Diagnosis:     Nontraumatic subdural hemorrhage, unspecified [I62.00]  Glioblastoma ([address] [C71.9]  Subdural hematoma, nontraumatic ([address] [I62.00]      CARE PLAN     NURSING:  Cl Pop while on this unit will:      Bowel and Bladder   [] Be continent of bowel and bladder      [] Have an adequate number of bowel movements   [] Urinate with no urinary retention >300ml in bladder   [] Bladder Scan: (details)   [] Complete bladder protocol with forman removal   [] Initiate Bladder Program to toilet every ___ hours   [] Initiate Bowel Program to toilet every ___hours   [] Bladder training    [] Bowel training  Pulmonary   [x] Maintain O2 SATs at 92% or greater  Pain Management   [x] Have pain managed while on ARU        [] Be pain free by discharge    [] Medication Management and Education  Maintenance of Skin Integrity/Wound Management   [] Have no skin breakdown while on ARU   [] Have improved skin integrity via wound measurements   [] Have no signs/symptoms of infection via infection protection and monitoring at the          wound site  Fall Prevention   [x] Be free from injury during hospitalization via fall prevention measures     [] Disease management and Education  Precautions   [] Weight Bearing Precautions   [] Swallowing Precautions   [] Monitoring of Risks of Complications   [] DVT Prophylaxis    [] Fluid/electrolyte/Nutrition Management    [] Complete education with patient/family with reintegration, bed mobility, w/c mobility and training, self care, home mgmt, cognitive training, energy conservation,dysphagia tx, speech/language/communication therapy, group therapy, and patient/family education. In addition, dietician/nutritionist may monitor calorie count as well as intake and collaboratively work with SLP on dietary upgrades. Neuropsychology/Psychology may evaluate and provide necessary support. Group therapy as appropriate to facilitate improved endurance, STR, COORD, function, safety, transfers, awareness and insight into deficits, problem solving, memory, and social interaction and engagement.     Medical issues being managed closely and that require 24 hour availability of a physician:   [] Swallowing Precautions                                     [x] Weight bearing precautions   [] Wound Care                             [x] Infection Prevention   [x] DVT Prophylaxis/assessment              [x] Monitoring for complications    [x] Fall Precautions/Prevention                         [] Fluid/Electrolyte/Nutrition Balance   [] Voice Protection                           [x] Medication Management   [x] Respiratory                   [x] Pain Mgmt   [x] Bowel/Bladder Fx    Medical Prognosis: [x] Good  [] Fair    [] Guarded   Total expected IRF days 10                                            Physician anticipated functional outcomes:  Return home with supervision to min assist  Rehab Goals:   [] Return to premorbid function of_______________________________.    [] Independent   [] Mod I  [x] Supervision  [] CGA   [x] Min A   [] Mod A  Level for ambulation []without assistive device  [x] with assistive device        [] Independent   [] Mod I  [x] Supervision [] CGA   [x] Min A   [] Mod A  Level for transfers []without assistive device  [] with assistive device         [] Independent   [] Mod I  [] Supervision [x] CGA   [x] Min A   [] Mod A Level with ADL's []without assistive device   [x] with

## 2023-03-19 PROCEDURE — 1280000000 HC REHAB R&B

## 2023-03-19 PROCEDURE — 6360000002 HC RX W HCPCS: Performed by: PHYSICAL MEDICINE & REHABILITATION

## 2023-03-19 PROCEDURE — 99223 1ST HOSP IP/OBS HIGH 75: CPT | Performed by: PHYSICAL MEDICINE & REHABILITATION

## 2023-03-19 PROCEDURE — 6370000000 HC RX 637 (ALT 250 FOR IP): Performed by: PHYSICAL MEDICINE & REHABILITATION

## 2023-03-19 PROCEDURE — 94761 N-INVAS EAR/PLS OXIMETRY MLT: CPT

## 2023-03-19 PROCEDURE — 94150 VITAL CAPACITY TEST: CPT

## 2023-03-19 RX ORDER — DEXAMETHASONE 4 MG/1
4 TABLET ORAL DAILY
Status: DISCONTINUED | OUTPATIENT
Start: 2023-03-19 | End: 2023-03-28 | Stop reason: HOSPADM

## 2023-03-19 RX ORDER — ACETAMINOPHEN 325 MG/1
650 TABLET ORAL EVERY 4 HOURS PRN
Status: DISCONTINUED | OUTPATIENT
Start: 2023-03-19 | End: 2023-03-28 | Stop reason: HOSPADM

## 2023-03-19 RX ORDER — METHYLPHENIDATE HYDROCHLORIDE 5 MG/1
10 TABLET ORAL 2 TIMES DAILY WITH MEALS
Status: DISCONTINUED | OUTPATIENT
Start: 2023-03-19 | End: 2023-03-28 | Stop reason: HOSPADM

## 2023-03-19 RX ORDER — SENNA PLUS 8.6 MG/1
1 TABLET ORAL 2 TIMES DAILY
Status: DISCONTINUED | OUTPATIENT
Start: 2023-03-19 | End: 2023-03-28 | Stop reason: HOSPADM

## 2023-03-19 RX ORDER — METHYLPHENIDATE HYDROCHLORIDE 5 MG/1
10 TABLET ORAL DAILY
Status: DISCONTINUED | OUTPATIENT
Start: 2023-03-19 | End: 2023-03-19

## 2023-03-19 RX ORDER — METHYLPHENIDATE HYDROCHLORIDE 5 MG/1
10 TABLET ORAL DAILY
Status: DISCONTINUED | OUTPATIENT
Start: 2023-03-19 | End: 2023-03-19 | Stop reason: SDUPTHER

## 2023-03-19 RX ADMIN — LEVETIRACETAM 500 MG: 500 TABLET, FILM COATED ORAL at 10:02

## 2023-03-19 RX ADMIN — LEVETIRACETAM 500 MG: 500 TABLET, FILM COATED ORAL at 20:02

## 2023-03-19 RX ADMIN — SODIUM CHLORIDE 1 G: 1 TABLET ORAL at 18:31

## 2023-03-19 RX ADMIN — FLUOXETINE HYDROCHLORIDE 10 MG: 10 CAPSULE ORAL at 10:03

## 2023-03-19 RX ADMIN — FAMOTIDINE 20 MG: 20 TABLET ORAL at 20:02

## 2023-03-19 RX ADMIN — SENNOSIDES 8.6 MG: 8.6 TABLET, COATED ORAL at 18:30

## 2023-03-19 RX ADMIN — DEXAMETHASONE 4 MG: 4 TABLET ORAL at 10:02

## 2023-03-19 RX ADMIN — SENNOSIDES 8.6 MG: 8.6 TABLET, COATED ORAL at 20:02

## 2023-03-19 RX ADMIN — Medication 6 MG: at 20:02

## 2023-03-19 RX ADMIN — DOCUSATE SODIUM 100 MG: 100 CAPSULE, LIQUID FILLED ORAL at 10:02

## 2023-03-19 RX ADMIN — LOSARTAN POTASSIUM 50 MG: 25 TABLET, FILM COATED ORAL at 10:02

## 2023-03-19 RX ADMIN — SODIUM CHLORIDE 1 G: 1 TABLET ORAL at 10:04

## 2023-03-19 RX ADMIN — FAMOTIDINE 20 MG: 20 TABLET ORAL at 10:02

## 2023-03-19 RX ADMIN — METHYLPHENIDATE HYDROCHLORIDE 10 MG: 5 TABLET ORAL at 10:02

## 2023-03-19 NOTE — H&P
Miguel Angel Johnson    : 1945  Acct #: [de-identified]  MRN: [id number]              History and physical      Admitting diagnosis: Subdural hematoma    Comorbid diagnoses impacting rehabilitation: Anxiety, depression, ocular histoplasmosis syndrome of both eyes, hypertension, sick sinus syndrome, sleep disorder, gait and ADL dysfunction    Chief complaint: Generalized weakness    History of present illness: Alvina Cruz is a 78-year-old male with a history of depression, hyperlipidemia, ocular histoplasmosis syndrome of both eyes, hypertension, sick sinus syndrome, sleep disorder and glioblastoma status post resection on 2022 along with radiation and Temodar who presented to [facility]) emergency department in [facility] with slurred speech and increased left-sided weakness compared to baseline. His daughter reports he was diagnosed with a possible seizure. He has a known subdural hematoma which was documented on prior MRI dated 3/2/2023. CT scan of the head revealed known subdural collection overlying the anterior lateral right frontal lobe. He was transferred acutely to [facility]. He was evaluated by neurooncology. It is documented that his acute confusion and left-sided weakness resolved however he does have baseline left-sided hemiplegia. Repeat MRI of the brain on 3/17 revealed no significant change. Due to continuing deficits with mobility and ADLs he is now admitted for acute inpatient rehabilitation. Review of systems: Reports constipation. Takes Senokot at home. Occasional urinary incontinence and urgency. Currently with depends in place. Denies chest pain or shortness of breath. Denies swallowing deficits deficits. Poor vision/blurriness. History of ocular histoplasmosis and recent right retinal surgery, reporting mild productive cough. .  The remainder of their review of systems was negative except as mentioned in the history of present illness.     Social History:    reports that he quit smoking about 48 years ago. He started smoking about 58 years ago. He has a 16.50 pack-year smoking history. He has never used smokeless tobacco. He reports current alcohol use. He reports that he does not use drugs. Prior (baseline) level of function: Spoke with the patient's daughter who was in the room. Her brother, her  and his mother provide assistance at home. He does require some assistance with ADLs including dressing and bathing. He does require a rolling walker for mobility and contact-guard assistance to prevent falls. They live in a 1 floor home with no steps to enter through the front. .    Current level of function: Toileting moderate assistance, upper and lower body dressing moderate assistance, sit to stand transfers moderate assistance. Allergies:  Patient has no known allergies. Past Medical History:   Past Medical History:   Diagnosis Date    Adenomatous polyp of colon 1/2001    Mildly Dysplastic Tubular Adenoma    Basal cell carcinoma of cheek 12/2012    left temple; Moh's; [facility]    Basal cell carcinoma of skin of trunk 11/2010    front of neck; [facility]    Hyperlipidemia 2015    lipids are OK; but [place] 12%    Hypertension 10/2014    Melanoma in situ of scalp ([address] 12/2012    Vertex scalp; Van level III    Mood disorder ([address]     Irritabilitiy    Ocular histoplasmosis syndrome of both eyes 2000    left eye greater than right eye-S/P laser treatment in [place];  Avastin ; Dr John Arriaza    Sleep disorder     chronic        Past Surgical History:     Past Surgical History:   Procedure Laterality Date    CARPAL TUNNEL RELEASE Right 07/02/2008    COLONOSCOPY  2015    COLONOSCOPY  2011    COLONOSCOPY  12/07/2018    polyps 4, call next week for pathology results    COLONOSCOPY N/A 12/7/2018    COLONOSCOPY POLYPECTOMY SNARE/COLD BIOPSY performed by Kurt Martinez MD at [address] [facility] N/A 7/8/2021    COLONOSCOPY POLYPECTOMY

## 2023-03-20 LAB
ANION GAP SERPL CALCULATED.3IONS-SCNC: 9 MMOL/L (ref 4–16)
BASOPHILS ABSOLUTE: 0 K/CU MM
BASOPHILS RELATIVE PERCENT: 0.2 % (ref 0–1)
BUN SERPL-MCNC: 21 MG/DL (ref 6–23)
CALCIUM SERPL-MCNC: 8.6 MG/DL (ref 8.3–10.6)
CHLORIDE BLD-SCNC: 102 MMOL/L (ref 99–110)
CO2: 24 MMOL/L (ref 21–32)
CREAT SERPL-MCNC: 0.8 MG/DL (ref 0.9–1.3)
DIFFERENTIAL TYPE: ABNORMAL
EOSINOPHILS ABSOLUTE: 0.1 K/CU MM
EOSINOPHILS RELATIVE PERCENT: 1.2 % (ref 0–3)
GFR SERPL CREATININE-BSD FRML MDRD: >60 ML/MIN/1.73M2
GLUCOSE SERPL-MCNC: 95 MG/DL (ref 70–99)
HCT VFR BLD CALC: 37.7 % (ref 42–52)
HEMOGLOBIN: 13.1 GM/DL (ref 13.5–18)
IMMATURE NEUTROPHIL %: 0.5 % (ref 0–0.43)
LYMPHOCYTES ABSOLUTE: 1 K/CU MM
LYMPHOCYTES RELATIVE PERCENT: 15.7 % (ref 24–44)
MCH RBC QN AUTO: 33.8 PG (ref 27–31)
MCHC RBC AUTO-ENTMCNC: 34.7 % (ref 32–36)
MCV RBC AUTO: 97.2 FL (ref 78–100)
MONOCYTES ABSOLUTE: 0.9 K/CU MM
MONOCYTES RELATIVE PERCENT: 14.2 % (ref 0–4)
NUCLEATED RBC %: 0 %
PDW BLD-RTO: 13.2 % (ref 11.7–14.9)
PLATELET # BLD: 125 K/CU MM (ref 140–440)
PMV BLD AUTO: 9.7 FL (ref 7.5–11.1)
POTASSIUM SERPL-SCNC: 4.1 MMOL/L (ref 3.5–5.1)
RBC # BLD: 3.88 M/CU MM (ref 4.6–6.2)
SEGMENTED NEUTROPHILS ABSOLUTE COUNT: 4.1 K/CU MM
SEGMENTED NEUTROPHILS RELATIVE PERCENT: 68.2 % (ref 36–66)
SODIUM BLD-SCNC: 135 MMOL/L (ref 135–145)
TOTAL IMMATURE NEUTOROPHIL: 0.03 K/CU MM
TOTAL NUCLEATED RBC: 0 K/CU MM
WBC # BLD: 6.1 K/CU MM (ref 4–10.5)

## 2023-03-20 PROCEDURE — 85025 COMPLETE CBC W/AUTO DIFF WBC: CPT

## 2023-03-20 PROCEDURE — 36415 COLL VENOUS BLD VENIPUNCTURE: CPT

## 2023-03-20 PROCEDURE — 97166 OT EVAL MOD COMPLEX 45 MIN: CPT

## 2023-03-20 PROCEDURE — 97535 SELF CARE MNGMENT TRAINING: CPT

## 2023-03-20 PROCEDURE — 92523 SPEECH SOUND LANG COMPREHEN: CPT

## 2023-03-20 PROCEDURE — 97530 THERAPEUTIC ACTIVITIES: CPT

## 2023-03-20 PROCEDURE — 6370000000 HC RX 637 (ALT 250 FOR IP): Performed by: PHYSICAL MEDICINE & REHABILITATION

## 2023-03-20 PROCEDURE — 6360000002 HC RX W HCPCS: Performed by: PHYSICAL MEDICINE & REHABILITATION

## 2023-03-20 PROCEDURE — 97116 GAIT TRAINING THERAPY: CPT

## 2023-03-20 PROCEDURE — 1280000000 HC REHAB R&B

## 2023-03-20 PROCEDURE — 94761 N-INVAS EAR/PLS OXIMETRY MLT: CPT

## 2023-03-20 PROCEDURE — 80048 BASIC METABOLIC PNL TOTAL CA: CPT

## 2023-03-20 PROCEDURE — 99232 SBSQ HOSP IP/OBS MODERATE 35: CPT | Performed by: PHYSICAL MEDICINE & REHABILITATION

## 2023-03-20 PROCEDURE — 97163 PT EVAL HIGH COMPLEX 45 MIN: CPT

## 2023-03-20 RX ADMIN — LEVETIRACETAM 500 MG: 500 TABLET, FILM COATED ORAL at 09:33

## 2023-03-20 RX ADMIN — SODIUM CHLORIDE 1 G: 1 TABLET ORAL at 11:46

## 2023-03-20 RX ADMIN — FAMOTIDINE 20 MG: 20 TABLET ORAL at 20:00

## 2023-03-20 RX ADMIN — FAMOTIDINE 20 MG: 20 TABLET ORAL at 09:33

## 2023-03-20 RX ADMIN — SENNOSIDES 8.6 MG: 8.6 TABLET, COATED ORAL at 09:33

## 2023-03-20 RX ADMIN — LEVETIRACETAM 500 MG: 500 TABLET, FILM COATED ORAL at 20:00

## 2023-03-20 RX ADMIN — LOSARTAN POTASSIUM 50 MG: 25 TABLET, FILM COATED ORAL at 09:32

## 2023-03-20 RX ADMIN — FLUOXETINE HYDROCHLORIDE 10 MG: 10 CAPSULE ORAL at 09:33

## 2023-03-20 RX ADMIN — METHYLPHENIDATE HYDROCHLORIDE 10 MG: 5 TABLET ORAL at 11:46

## 2023-03-20 RX ADMIN — SODIUM CHLORIDE 1 G: 1 TABLET ORAL at 09:33

## 2023-03-20 RX ADMIN — METHYLPHENIDATE HYDROCHLORIDE 10 MG: 5 TABLET ORAL at 09:33

## 2023-03-20 RX ADMIN — SODIUM CHLORIDE 1 G: 1 TABLET ORAL at 18:08

## 2023-03-20 RX ADMIN — SENNOSIDES 8.6 MG: 8.6 TABLET, COATED ORAL at 20:00

## 2023-03-20 RX ADMIN — POLYETHYLENE GLYCOL 3350 17 G: 17 POWDER, FOR SOLUTION ORAL at 05:06

## 2023-03-20 RX ADMIN — DEXAMETHASONE 4 MG: 4 TABLET ORAL at 09:33

## 2023-03-20 RX ADMIN — Medication 6 MG: at 20:00

## 2023-03-20 NOTE — CONSULTS
recommendation at this time  Coordination of Nutrition Care: Continue to monitor while inpatient, Feeding Assistance/Environment Change       Goals:     Goals: Meet at least 75% of estimated needs       Nutrition Monitoring and Evaluation:   Behavioral-Environmental Outcomes: None Identified  Food/Nutrient Intake Outcomes: Food and Nutrient Intake, Supplement Intake  Physical Signs/Symptoms Outcomes: Biochemical Data, GI Status, Meal Time Behavior, Skin, Weight    Discharge Planning:    No discharge needs at this time     Prashanth Lima, [address], LD  Contact: [phone]

## 2023-03-20 NOTE — PROGRESS NOTES
Goals: 1x/week x1 week 30 mins min  LTG: Pt and spouse will complete education on L labial facial stim and vocal hygiene with good understanding demonstrated. Goal 1: Pt will complete education and hands on training for L labial facial STR for improved facial symmetry for reduced anterior loss of saliva and vocal hygiene strategies to improve vocal fx until pt can be seen by ENT. Patient/family involved in developing goals and treatment plan: Pt and spouse are in agreement    Subjective:   Previous level of function and limitations: Humble Pena was independent with activities of daily living prior to admit. Wife assists with meds and finances. Pt has visual deficits that impact reading and writing and driving. Social/Functional History  Education: [employer]. Occupation: Retired  Type of Occupation: [employer] - [place] and [place]  Leisure & Hobbies: Pt enjoys golfing, likes to keep track of fires . Books on tape/audible. Doctors appts. No pets.   Vision  Vision Exceptions: Wears glasses at all times  Hearing  Hearing Exceptions: Hard of hearing/hearing concerns           Objective:       Oral Motor   Labial: Left droop (Wife feels it has worsened with this admission.)  Oral Hygiene: Moist  Lingual: Left deviation  Velum: No Impairment  Mandible: No impairment    Motor Speech  Apraxic Characteristics: None  Dysarthric Characteristics: None  Intelligibility: No impairment  Overall Impairment Severity: None    Auditory Comprehension  Comprehension: Within Functional Limits    Reading Comprehension  Reading Status: Exceptions to [facility] (Poor vision due to ocular histoplasmosis bilaterally)  Interfering Components: Visual acuity    Expression  Primary Mode of Expression: Verbal    Verbal Expression  Verbal Expression: Within functional limits    Written Expression  Dominant Hand: Right  Written Expression: Exceptions to [facility]  Self formulation Impairment Severity: Sentence (impacted by vision and SYMPTOMS ARE COMMON      Dementia Impairment Scale:  Mild Cognitive Impairment  38  Mild Dementia  28  Moderate Dementia  18      BCAT score for May Riedel: 39/50 indicating mild cognitive impairment. Strengths:  Ability to put names to objects  Ability to concentrate and focus  Determine how objects are similar to one another  Understand and express speech  Command and control\" cognitive activities  Awareness of self, time, place, and situation  Ability to immediately recall a word list of 4 items: banana/justice/Ritu/bridge. Weaknesses:  Understand visual processes and relationships  Pt able to complete immediate recall 4/4 and delayed recall 2/4  Ability to recall 3 pictures after 10 minute delay 1/3  Recall previously presented words over a time delay and recall elements of a story and previously presented pictures/story  Story specifics included:  Asia Speedy / borrowed / $9 / from her brother / Justine Reveles / last week. / She couldn't pay him back / because she bought /a delicious / ice cream cone / at the circus instead. Pts immediate response was 7/11 and delayed response: 7/11  Strategies that improved performance included:   [x] repetition   [x] practical application    [] spaced retrieval    [x] choice cues    [x] visual cues  Voice Evaluation  Vocal Quality: Exceptions to [facility]  Breath Support: Adequate for speech  Aphonic: No  Breathy: Mild  Hoarse: Moderate  Dysphonic: Mild  Vocal Intensity: Mildly decreased (progressive fatigue)  Maximum Phonation Time: 9 secs  Conversational Loudness Level (dB):  (reduced)    Observed pt with PT with impulsivity, reduced attn, and memory deficits in fx for safety. Prognosis:  Speech Therapy Prognosis  Prognosis: Good  Prognosis Considerations: Previous Level of Function; Family/Community Support;Participation Level; Co-Morbidities; Motivation  Individuals consulted  Consulted and agree with results and recommendations: Patient;PT;OT (wife,)    Education:     Safety

## 2023-03-20 NOTE — PROGRESS NOTES
Occupational Therapy                              [facility] ARU OCCUPATIONAL THERAPY EVALUATION    Chart Review:  Past Medical History:   Diagnosis Date    Adenomatous polyp of colon 1/2001    Mildly Dysplastic Tubular Adenoma    Basal cell carcinoma of cheek 12/2012    left temple; Moh's; [facility]    Basal cell carcinoma of skin of trunk 11/2010    front of neck; [facility]    Hyperlipidemia 2015    lipids are OK; but [place] 12%    Hypertension 10/2014    Melanoma in situ of scalp ([facility] 12/2012    Vertex scalp; Van level III    Mood disorder ([facility]     Irritabilitiy    Ocular histoplasmosis syndrome of both eyes 2000    left eye greater than right eye-S/P laser treatment in [place];  Avastin ; Dr Martha Ness    Sleep disorder     chronic     Past Surgical History:   Procedure Laterality Date    CARPAL TUNNEL RELEASE Right 07/02/2008    COLONOSCOPY  2015    COLONOSCOPY  2011    COLONOSCOPY  12/07/2018    polyps 4, call next week for pathology results    COLONOSCOPY N/A 12/7/2018    COLONOSCOPY POLYPECTOMY SNARE/COLD BIOPSY performed by Leydi Mcdowell MD at [address] [facility] N/A 7/8/2021    COLONOSCOPY POLYPECTOMY SNARE/COLD BIOPSY performed by Leydi Mcdowell MD at [id number] [place] [address] ARTHROSCOPY Right 9/2015    Dr Trinh Gutierrez      L4-L5    [address]  12/2012    left temple: [facility]; [address]  11/2012    Melanoma; scalp     Social History:  Social/Functional History  Lives With: Spouse (Lives with Deatra Forge - Wife (in good health))  Type of Home: House  Home Layout: One level  Home Access: Stairs to enter without rails  Entrance Stairs - Number of Steps: 1 step to get into back door (Pt usually goes in this entrance), level entry to front door  Bathroom Shower/Tub: Walk-in shower  Bathroom Toilet: Standard  Bathroom Equipment: Grab bars in shower, Grab bars around toilet  Bathroom Accessibility: [address] Equipment: Walker, rolling, Wheelchair-manual, Grab bars, Reacher  Has the patient had two or more falls in the past year or any fall with injury in the past year?: Yes (Pt states 4 falls in the last year without significant injury.)  Receives Help From: Family  ADL Assistance: [address]: Needs assistance  Homemaking Responsibilities: Yes  Meal Prep Responsibility: No (Wife completes.)  Laundry Responsibility: No (Wife completes.)  Cleaning Responsibility: No (Hired help.)  Bill Paying/Finance Responsibility: Secondary (Shared responsibility with wife.)  Shopping Responsibility: No (Wife completes.)  Dependent Care Responsibility: No  Health Care Management: No (Wife completes.)  Ambulation Assistance: Independent  Transfer Assistance: Independent  Active : No  Patient's  Info: Wife drives. Mode of Transportation: Car, SUV, Family  Education: [employer] and . 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 Books on tape/audible. Doctors appts. No pets. Additional Comments: Pt sleeps in regular flat bed (Pt states difficult to push up from his mattress). Pt is R hand dominant. Restrictions:  Restrictions/Precautions  Restrictions/Precautions: Fall Risk, General Precautions, Seizure (Left side neglect.)    IRF-Hearing and Speech: Hearing, Speech, and Vision  Expression of Ideas and Wants: Without difficulty  Understanding Verbal and Non-Verbal Content: Understands  Ability to Hear: Moderate difficulty (Ocular hitoplasmosis syndrom B)  Ability to See in Adequate Light: Impaired  IRF-COG:  Cognitive Patterns  Cognitive Pattern Assessment Used: BIMS  Repetition of Three Words (First Attempt): 3  Temporal Orientation: Year: Correct  Temporal Orientation: Month:  Accurate within 5 days  Temporal Orientation: Day: Correct  Able to recall \"sock: Yes, no cue required  Able to recall

## 2023-03-20 NOTE — PROGRESS NOTES
rolling, Wheelchair-manual, Grab bars, Reacher  Has the patient had two or more falls in the past year or any fall with injury in the past year?: Yes (Pt states 4 falls in the last year without significant injury.)  Receives Help From: Family  ADL Assistance: [address]: Needs assistance  Homemaking Responsibilities: Yes  Meal Prep Responsibility: No (Wife completes.)  Laundry Responsibility: No (Wife completes.)  Cleaning Responsibility: No (Hired help.)  Bill Paying/Finance Responsibility: Secondary (Shared responsibility with wife.)  Shopping Responsibility: No (Wife completes.)  Dependent Care Responsibility: No  Health Care Management: No (Wife completes.)  Ambulation Assistance: Independent  Transfer Assistance: Independent  Active : No  Patient's  Info: Wife drives. Mode of Transportation: Car, SUV, Family  Education: [employer]. 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 Books on tape/audible. Doctors appts. No pets. Additional Comments: Pt sleeps in regular flat bed (Pt states difficult to push up from his mattress). Pt is R hand dominant. Restrictions:  Restrictions/Precautions  Restrictions/Precautions: Fall Risk, General Precautions, Seizure (Left side neglect.)                    Objective:  Orientation  Overall Orientation Status: Within Functional Limits  Orientation Level: Oriented to person, Oriented to time, Oriented to place      Pt demonstrates impulsivity, decreased carryover of learned tasks, decreased safety and judgement during evaluation. Pt tends to minimize if not dismiss deficits. Vision  Vision Exceptions:  (Pt demonstrates decreased depth perception in functional tasks.  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 Observation:   Observation/Palpation  Observation: Pt in recliner upon entrance, aide in room about to transfer him back to bed, but once explained intent for eval,  pleasant and agreeable to therapy session. ROM:   PROM RLE (degrees)  RLE PROM: WFL     PROM LLE (degrees)  LLE PROM: WFL                 RLE PROM: WFL  Strength:    Strength RLE  Strength RLE: WFL  Strength LLE  Strength LLE: WFL              Bed Mobility:   Lying to Sitting on Side of Bed  Assistance Needed: Partial/moderate assistance  Comment: min assist for trunk  CARE Score: 3  Discharge Goal: Supervision or touching assistance  Roll Left and Right  Assistance Needed: Supervision or touching assistance  Comment: no bed features, increased time given, cues for depth perception to EOB to L  CARE Score: 4  Discharge Goal: Independent  Sit to Lying  Assistance Needed: Partial/moderate assistance  Comment: initial sitting balance on EOB with max assist to control trunk due to retropulsion. reset sitting balance as pt could not self right, then proceeded with  supervision with mod effort to lift legs into bed with LLE lagging, but pt self corrected. no bed features  CARE Score: 3  Discharge Goal: Independent    Transfers:    Sit to Stand  Assistance Needed: Partial/moderate assistance  Comment: min assist to 2ww due to retropulsion  CARE Score: 3  Discharge Goal: Supervision or touching assistance  Chair/Bed-to-Chair Transfer  Assistance Needed: Partial/moderate assistance  Comment: min assist for balance and mod cues for perception with use of 2ww  CARE Score: 3  Discharge Goal: Supervision or touching assistance     Car Transfer  Assistance Needed: Substantial/maximal assistance  Comment: max assist over all.  pt entered car with CGA by sitting on seat, then

## 2023-03-21 PROBLEM — R53.1 GENERALIZED WEAKNESS: Status: ACTIVE | Noted: 2023-03-21

## 2023-03-21 PROBLEM — K59.09 CHRONIC CONSTIPATION: Status: ACTIVE | Noted: 2023-03-21

## 2023-03-21 PROBLEM — R26.9 GAIT DISTURBANCE: Status: ACTIVE | Noted: 2023-03-21

## 2023-03-21 PROBLEM — F41.8 DEPRESSION WITH ANXIETY: Status: ACTIVE | Noted: 2023-03-21

## 2023-03-21 PROCEDURE — 6370000000 HC RX 637 (ALT 250 FOR IP): Performed by: PHYSICAL MEDICINE & REHABILITATION

## 2023-03-21 PROCEDURE — 97530 THERAPEUTIC ACTIVITIES: CPT

## 2023-03-21 PROCEDURE — 1280000000 HC REHAB R&B

## 2023-03-21 PROCEDURE — 92507 TX SP LANG VOICE COMM INDIV: CPT

## 2023-03-21 PROCEDURE — 97112 NEUROMUSCULAR REEDUCATION: CPT

## 2023-03-21 PROCEDURE — 6360000002 HC RX W HCPCS: Performed by: PHYSICAL MEDICINE & REHABILITATION

## 2023-03-21 PROCEDURE — 94664 DEMO&/EVAL PT USE INHALER: CPT

## 2023-03-21 PROCEDURE — 97116 GAIT TRAINING THERAPY: CPT

## 2023-03-21 PROCEDURE — 94150 VITAL CAPACITY TEST: CPT

## 2023-03-21 PROCEDURE — 97110 THERAPEUTIC EXERCISES: CPT

## 2023-03-21 PROCEDURE — 94761 N-INVAS EAR/PLS OXIMETRY MLT: CPT

## 2023-03-21 RX ADMIN — Medication 6 MG: at 21:22

## 2023-03-21 RX ADMIN — METHYLPHENIDATE HYDROCHLORIDE 10 MG: 5 TABLET ORAL at 12:40

## 2023-03-21 RX ADMIN — SENNOSIDES 8.6 MG: 8.6 TABLET, COATED ORAL at 21:21

## 2023-03-21 RX ADMIN — DEXAMETHASONE 4 MG: 4 TABLET ORAL at 10:00

## 2023-03-21 RX ADMIN — FLUOXETINE HYDROCHLORIDE 10 MG: 10 CAPSULE ORAL at 10:00

## 2023-03-21 RX ADMIN — SODIUM CHLORIDE 1 G: 1 TABLET ORAL at 09:59

## 2023-03-21 RX ADMIN — FAMOTIDINE 20 MG: 20 TABLET ORAL at 21:21

## 2023-03-21 RX ADMIN — LEVETIRACETAM 500 MG: 500 TABLET, FILM COATED ORAL at 21:21

## 2023-03-21 RX ADMIN — ACETAMINOPHEN 650 MG: 325 TABLET ORAL at 21:22

## 2023-03-21 RX ADMIN — SODIUM CHLORIDE 1 G: 1 TABLET ORAL at 17:24

## 2023-03-21 RX ADMIN — FAMOTIDINE 20 MG: 20 TABLET ORAL at 10:00

## 2023-03-21 RX ADMIN — METHYLPHENIDATE HYDROCHLORIDE 10 MG: 5 TABLET ORAL at 10:00

## 2023-03-21 RX ADMIN — LEVETIRACETAM 500 MG: 500 TABLET, FILM COATED ORAL at 10:00

## 2023-03-21 RX ADMIN — SODIUM CHLORIDE 1 G: 1 TABLET ORAL at 12:40

## 2023-03-21 RX ADMIN — SENNOSIDES 8.6 MG: 8.6 TABLET, COATED ORAL at 10:00

## 2023-03-21 RX ADMIN — LOSARTAN POTASSIUM 50 MG: 25 TABLET, FILM COATED ORAL at 10:00

## 2023-03-21 ASSESSMENT — PAIN SCALES - GENERAL: PAINLEVEL_OUTOF10: 0

## 2023-03-21 NOTE — PROGRESS NOTES
Family and patient concerned with ritalin use. They stated at [facility] they was told they could give ritalin in morning but did not need to give afternoon dose due to keeps him awake at night  showed family that orders we received has ritalin ordered BID. Informed Dr. Estela Petersen of family and patient concern.

## 2023-03-21 NOTE — PROGRESS NOTES
Physical Therapy  [x] daily progress note       [] discharge       Patient Name:  Deann Salinas   :  1945 MRN: [id number]  Room:  [address] [place] [address] Date of Admission: 3/18/2023  Rehabilitation Diagnosis:   Nontraumatic subdural hemorrhage, unspecified [I62.00]  Glioblastoma ([facility] [C71.9]  Subdural hematoma, nontraumatic ([facility] [I62.00]       Date 3/21/2023       Day of ARU Week:  4   Time IN/OUT 1100/1200, 1310/1340   Individual Tx Minutes 60,30   Group Tx Minutes    Co-Treat Minutes    Concurrent Tx Minutes    TOTAL Tx Time Mins 90   Variance Time    Variance Time []   Refusal due to:     []   Medical hold/reason:    []   Illness   []   Off Unit for test/procedure  []   Extra time needed to complete task  []   Therapeutic need  []   Other (specify):   Restrictions Restrictions/Precautions  Restrictions/Precautions: Fall Risk, General Precautions, Seizure (Left side neglect.)      Interdisciplinary communication [x]   Cleared for therapy per nursing     []   RN notified about issues during session  []   RN updated on pt performance  []   Spoke with   []   Spoke with OT  []   Spoke with MD  []   Other:    Subjective observations and cognitive status: Pt in bed, agreeable to therapy. Pt had been placed in recliner at end of am session, but was back in bed when PT entered for pm session. Wife had assisted pt back to bed with walker. Pt demonstrated increased difficulty during high distraction settings today, affecting attention and quality of mobility.       Pain level/location:   0 /10       Location:    Discharge recommendations  Anticipated discharge date:  tbd  Destination: []home alone   []home alone with assist PRN     [x] home w/ family      [] Continuous supervision  []SNF    [] Assisted living     [] Other:  Continued therapy: [x]HHC PT  []OUTPATIENT  PT   [] No Further PT  []SNF PT  Caregiver training recommended: []Yes  [] No   Equipment needs: none anticipated     Bed Mobility:           []   Pt Responsibility: No (Hired help.)  Bill Paying/Finance Responsibility: Secondary (Shared responsibility with wife.)  Shopping Responsibility: No (Wife completes.)  Dependent Care Responsibility: No  Health Care Management: No (Wife completes.)  Ambulation Assistance: Independent  Transfer Assistance: Independent  Active : No  Patient's  Info: Wife drives. Mode of Transportation: Car, SUV, Family  Education: [employer] and [employer]. 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 Books on tape/audible. Doctors appts. No pets. Additional Comments: Pt sleeps in regular flat bed (Pt states difficult to push up from his mattress). Pt is R hand dominant. Objective                                                                                    Goals:  (Update in navigator)  Short Term Goals  Time Frame for Short Term Goals: STG=LTG, 7 days  Short Term Goal 1: Pt will perform bed mobility with mod I for rolling and sit to supine, supervision for lying to sit  Short Term Goal 2: pt will perform sit to stand and w/c<>bed with supervision, car transfer with CGA  Short Term Goal 3: Pt will ambulate with 2ww 150' on level surface with SBA and 10' on unlevel surface with CGA  Short Term Goal 4: Pt will ascend/descend curb step with 2ww and CG, 4 steps with rails with CGA  Short Term Goal 5: Pt will retrieve light object with 2ww and reacher with CGA:   :        Plan of Care                                                                              Times per week: 5 days per week for a minimum of 60 minutes/day plus group as appropriate for 60 minutes.   Treatment to include Current Treatment Recommendations: Balance training, Functional mobility training, Transfer training, IADL training, ADL/Self-care training, Endurance training, Cognitive/Perceptual training, Gait training, Stair training, Neuromuscular

## 2023-03-21 NOTE — PROGRESS NOTES
Occupational Therapy  Physical Rehabilitation: OCCUPATIONAL THERAPY     [x] daily progress note       [] discharge       Patient Name:  Sharlet Ganser   :  1945 MRN: [id number]  Room:  [address] [place] [address] Date of Admission: 3/18/2023  Rehabilitation Diagnosis:   Nontraumatic subdural hemorrhage, unspecified [I62.00]  Glioblastoma ([facility] [address] [C71.9]  Subdural hematoma, nontraumatic ([facility] [address] [facility] [I62.00]       Date 3/21/2023       Day of ARU Week:  4   Time IN/OUT 1319-9326   Individual Tx Minutes 60   Group Tx Minutes    Co-Treat Minutes    Concurrent Tx Minutes    TOTAL Tx Time Mins 60   Variance Time    Variance Time []   Refusal due to:     []   Medical hold/reason:    []   Illness   []   Off Unit for test/procedure  []   Extra time needed to complete task  []   Therapeutic need  []   Other (specify):   Restrictions Restrictions/Precautions: Fall Risk, General Precautions, Seizure (Left side neglect.)         Communication with other providers: [x]   OK to see per nursing:     []   Spoke with team member regarding:      Subjective observations and cognitive status: Pt resting in bed on approach; pleasant and agreeable to therapy session. Pain level/location:    /10       Location:    Discharge recommendations  Anticipated discharge date:  TBD  Destination: []home alone   []home alone w assist prn   [] home w/ family    [] Continuous supervision       []SNF    [] Assisted living     [] Other:   Continued therapy: []C OT  []OUTPATIENT  OT   [] No Further OT  Equipment needs: Sharlet Ganser requires the assistance of a shower chair to successfully and safely complete bathing activities necessary due to reduced balance, endurance, need for ADL assist, and LE weakness and reduced ROM. These tasks cannot be safely completed without this device and would place Sharlet Ganser at greater risk of falls.       Toileting:   denied need        Toilet Transfers:   NA   Device Used:    []   Standard Toilet         []   [facility] []  Bedside Commode       []   Elevated Toilet          []   Other:        Bed Mobility:           []   Pt received out of bed   Supine --> Sit:  SBA c occasional cue, use of bed rail   Sit --> Supine:  SBA     Transfers:    Sit--> Stand:  SBA   Stand --> Sit:   SBA  Stand-Pivot:   CGA  Other:    Assistive device required for transfer:   RW       Functional Mobility:  151 ft c cues for walker safety   Assistance:  CGA   Device:   [x]   Rolling Walker     []   Standard Walker []   Wheelchair        []   [employer]       []   4-Wheeled Socorro Dawkins         []   Cardiac Walker       []   Other:        Additional Therapeutic activities/exercises completed this date:     []   ADL Training   []   Balance/Postural training     []   Bed/Transfer Training   [x]   Endurance Training: patient instructed in bilateral reciprocal patterned movement with min resistance while seated for 8 minutes with 2 rest breaks to increase endurance/activity tolerance for facilitation of both ADL and mobility tasks. Pt required mod cues to stay on task and to involve LUE. []   Neuromuscular Re-ed: To increase intrinsic hand strength, pt completed therex c red theraputty targeting gross grasp/release, in hand rotation, tip pinch, and digit extension. Pt required max cues to stay on task.   -to address LUE strength and [address] as well as attention to task, pt completed tabletop therax using a pattern and connecting cubes. Pt required max cues to stay on task and max cues to follow pattern c 50% accuracy. []   Nu-step:  Time:        Level:         #Steps:       []   Rebounder:    []  Seated     []  Standing        []   Supine Ther Ex (reps/sets):     []   Seated Ther Ex (reps/sets):     []   Standing Ther Ex (reps/sets):     []   Other:      Comments: All intervention performed to increase pt's strength, endurance, ax tolerance, and balance in prep for increased I c ADL/IADLs and functional transfers/mobility.        Patient/Caregiver Education and

## 2023-03-21 NOTE — PROGRESS NOTES
Disha Fischer    : 1945  Acct #: [de-identified]  MRN: [id number]              PM&R Progress Note      Admitting diagnosis: Subdural hematoma ( [address])     Comorbid diagnoses impacting rehabilitation: Left hemiparesis, glioblastoma, depression with anxiety, ocular histoplasmosis syndrome of both eyes, hypertension, sick sinus syndrome, sleep disorder, gait disturbance, chronic constipation    Chief complaint: Fatigue during activities. Prior (baseline) level of function: Independent. Current level of function:         Current  [facility] and Goals:   Occupational Therapy:    Short Term Goals  Time Frame for Short Term Goals: STGs=LTGs :   Long Term Goals  Time Frame for Long Term Goals : 12-14 days or until d/c. Long Term Goal 1: Pt will complete oral hygiene and grooming tasks with IND. Long Term Goal 2: Pt will complete total body bathing with AE PRN and supervision. Long Term Goal 3: Pt will complete UB dressing with supervision. Long Term Goal 4: Pt will complete LB dressing with AE PRN and supervision. Long Term Goal 5: Pt will doff/don footwear with AE PRN and Mod I. Additional Goals?: Yes  Long Term Goal 6: Pt will complete toileting with supervision. Long Term Goal 7: Pt will complete functional transfers (bed, chair, shower, toilet) with DME PRN and supervision. Long Term Goal 8: Pt will perform therex/therax to facilitate an increase in strength/endurance with emphasis on dynamic standing balance/tolerance > 8 mins with supervision. :                                       Eating: Eating  Assistance Needed: Independent  Comment: Pt able open packages/containers and eat breakfast IND. CARE Score: 6  Discharge Goal: Independent       Oral Hygiene: Oral Hygiene  Assistance Needed: Supervision or touching assistance  Comment: CGA in stance to brush teeth; Pt required assist finding toothbrush and toothpaste in his bag d/t vision deficits.   CARE Score: 4  Discharge Goal: Independent    UB/LB seizures. Verbal cues and moderate physical assistance needed for transfers. DVT prophylaxis: Chemoprophylaxis is contraindicated due to his intracranial bleeding. SCDs and daily weightbearing are the primary strategy. Closely monitoring for thrombosis. Essential hypertension: Cozaar is required to control systolic blood pressure. Ritalin can aggravate his blood pressure and we must monitor him for signs of intolerance to the medication. Target systolic blood pressures 872-119. Vital signs are checked at rest and with activity. 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 Sodium chloride tablets. Monitoring his chemistries periodically. Depression with anxiety: Treating him in a calm and consistent environment working on sleep regulation. He requires Prozac. Assessing his carryover of new information. Chronic constipation: MiraLAX as needed. Encouraging food choices known to help maintain regularity.

## 2023-03-21 NOTE — PROGRESS NOTES
[facility]  ACUTE REHAB UNIT  SPEECH/LANGUAGE PATHOLOGY      [x] Daily           [x] Discharge    Patient:Jarad Camarillo      :1945  D:[id number]  Rehab Dx/Hx: Nontraumatic subdural hemorrhage, unspecified [I62.00]  Glioblastoma ([facility] [address] [C71.9]  Subdural hematoma, nontraumatic ([facility] [address] [I62.00]   No Known Allergies  Precautions:  Restrictions/Precautions: Fall Risk, General Precautions, Seizure (Left side neglect.)          Home Situation/IADL:   Social/Functional History  Lives With: Spouse (Lives with Miroslava Henry - Wife (in good health))  Type of Home: House  Home Layout: One level  Home Access: Stairs to enter without rails  Entrance Stairs - Number of Steps: 1 step to get into back door (Pt usually goes in this entrance), level entry to front door  Bathroom Shower/Tub: Walk-in shower  Bathroom Toilet: Standard  Bathroom Equipment: Grab bars in shower, Grab bars around toilet  Bathroom Accessibility: Accessible  Home Equipment: Walker, rolling, Wheelchair-manual, Grab bars, Reacher  Has the patient had two or more falls in the past year or any fall with injury in the past year?: Yes (Pt states 4 falls in the last year without significant injury.)  Receives Help From: Family  ADL Assistance: [address]: Needs assistance  Homemaking Responsibilities: Yes  Meal Prep Responsibility: No (Wife completes.)  Laundry Responsibility: No (Wife completes.)  Cleaning Responsibility: No (Hired help.)  Bill Paying/Finance Responsibility: Secondary (Shared responsibility with wife.)  Shopping Responsibility: No (Wife completes.)  Dependent Care Responsibility: No  Health Care Management: No (Wife completes.)  Ambulation Assistance: Independent  Transfer Assistance: Independent  Active : No  Patient's  Info: Wife drives. Mode of Transportation: Car, SUV, Family  Education: [employer] and [employer].   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 Books on tape/audible. Doctors appts. No pets. Additional Comments: Pt sleeps in regular flat bed (Pt states difficult to push up from his mattress). Pt is R hand dominant. Date of Admit: 3/18/2023  Room #: 1019/1019-A     ST Number of Minutes/Billable Intervention  Cog/Memory Deficits     Aphasia/Language     Dysarthria/Speech 30    Apraxia/Speech     Dysphagia/Swallowing     Group     Other    TOTAL Minutes Billed  30    Variance          Date: 3/21/2023  Day of ARU Week:  4       SLP Individual Minutes  Time In: 1020  Time Out: 1050  Minutes: 30         Variance/Reason:  [] Refusal due to   [] Medical hold/reason  [] Illness   [] Off Unit for test/procedure  [] Extra time needed to complete task  [] Other (specify)    Activity completed: Education via demonstration and repetition for facial muscle STR and vocal hygiene    Pain: denies  Current Diet: ADULT DIET; Regular  ADULT ORAL NUTRITION SUPPLEMENT; Dinner; Low Calorie/High Protein Oral Supplement  Subjective: alert and cooperative; some perseverations on questions      Goals and POC: Co-treats where appropriate with PT or OT to facilitate patient goals in functional tasks. LTG                           Short Term Goals  Time Frame for Short Term Goals: 1x/week x1 week 30 mins min  LTG: Pt and spouse will complete education on L labial facial stim and vocal hygiene with good understanding demonstrated. Goal met--discontinue  Goal 1: Pt will complete education and hands on training for L labial facial STR for improved facial symmetry for reduced anterior loss of saliva and vocal hygiene strategies to improve vocal fx until pt can be seen by ENT. Completed education on vocal hygiene strategies and incorporated for pts daily activities. Handout was provided after review.   Pt likes to drink caffiene and occasional alcohol so recommended increased H2O and decreased caffiene and

## 2023-03-22 PROCEDURE — 6370000000 HC RX 637 (ALT 250 FOR IP): Performed by: PHYSICAL MEDICINE & REHABILITATION

## 2023-03-22 PROCEDURE — 6360000002 HC RX W HCPCS: Performed by: PHYSICAL MEDICINE & REHABILITATION

## 2023-03-22 PROCEDURE — 1280000000 HC REHAB R&B

## 2023-03-22 PROCEDURE — 97116 GAIT TRAINING THERAPY: CPT

## 2023-03-22 PROCEDURE — 97150 GROUP THERAPEUTIC PROCEDURES: CPT

## 2023-03-22 PROCEDURE — 97112 NEUROMUSCULAR REEDUCATION: CPT

## 2023-03-22 PROCEDURE — 97530 THERAPEUTIC ACTIVITIES: CPT

## 2023-03-22 RX ORDER — DOCUSATE SODIUM 100 MG/1
100 CAPSULE, LIQUID FILLED ORAL 2 TIMES DAILY
Status: DISCONTINUED | OUTPATIENT
Start: 2023-03-22 | End: 2023-03-22

## 2023-03-22 RX ORDER — POLYETHYLENE GLYCOL 3350 17 G/17G
17 POWDER, FOR SOLUTION ORAL DAILY
Status: DISCONTINUED | OUTPATIENT
Start: 2023-03-22 | End: 2023-03-28

## 2023-03-22 RX ORDER — DOCUSATE SODIUM 100 MG/1
100 CAPSULE, LIQUID FILLED ORAL DAILY
Status: DISCONTINUED | OUTPATIENT
Start: 2023-03-22 | End: 2023-03-28 | Stop reason: HOSPADM

## 2023-03-22 RX ADMIN — FAMOTIDINE 20 MG: 20 TABLET ORAL at 09:49

## 2023-03-22 RX ADMIN — DOCUSATE SODIUM 100 MG: 100 CAPSULE, LIQUID FILLED ORAL at 09:49

## 2023-03-22 RX ADMIN — FAMOTIDINE 20 MG: 20 TABLET ORAL at 20:27

## 2023-03-22 RX ADMIN — LEVETIRACETAM 500 MG: 500 TABLET, FILM COATED ORAL at 09:49

## 2023-03-22 RX ADMIN — SODIUM CHLORIDE 1 G: 1 TABLET ORAL at 09:55

## 2023-03-22 RX ADMIN — SODIUM CHLORIDE 1 G: 1 TABLET ORAL at 12:20

## 2023-03-22 RX ADMIN — Medication 6 MG: at 20:26

## 2023-03-22 RX ADMIN — LOSARTAN POTASSIUM 50 MG: 25 TABLET, FILM COATED ORAL at 09:49

## 2023-03-22 RX ADMIN — SENNOSIDES 8.6 MG: 8.6 TABLET, COATED ORAL at 20:26

## 2023-03-22 RX ADMIN — LEVETIRACETAM 500 MG: 500 TABLET, FILM COATED ORAL at 20:32

## 2023-03-22 RX ADMIN — METHYLPHENIDATE HYDROCHLORIDE 10 MG: 5 TABLET ORAL at 09:55

## 2023-03-22 RX ADMIN — FLUOXETINE HYDROCHLORIDE 10 MG: 10 CAPSULE ORAL at 09:49

## 2023-03-22 RX ADMIN — ACETAMINOPHEN 650 MG: 325 TABLET ORAL at 20:27

## 2023-03-22 RX ADMIN — DEXAMETHASONE 4 MG: 4 TABLET ORAL at 09:49

## 2023-03-22 RX ADMIN — SODIUM CHLORIDE 1 G: 1 TABLET ORAL at 17:32

## 2023-03-22 RX ADMIN — SENNOSIDES 8.6 MG: 8.6 TABLET, COATED ORAL at 09:49

## 2023-03-22 ASSESSMENT — PAIN SCALES - GENERAL
PAINLEVEL_OUTOF10: 0
PAINLEVEL_OUTOF10: 0

## 2023-03-22 NOTE — PROGRESS NOTES
03/22/23 1023   Encounter Summary   Encounter Overview/Reason  Initial Encounter   Service Provided For: Patient   Referral/Consult From: Rounding   Support System Spouse; Children   Last Encounter  03/22/23  (Had prayer with patient and visitation. Patient was furnual director and ministor. Offered spiritual support if needed.)   Complexity of Encounter Low   Begin Time 1010   End Time  1026   Total Time Calculated 16 min   Encounter    Type Initial Screen/Assessment   Spiritual/Emotional needs   Type Spiritual Support   Assessment/Intervention/Outcome   Assessment Calm;Peaceful   Intervention Active listening;Discussed belief system/Scientology practices/jose;Discussed illness injury and its impact; Discussed relationship with God;Life review/Legacy;Prayer (assurance of)/[place];Nurtured Hope;Sustaining Presence/Ministry of presence   Outcome Engaged in conversation;Encouraged;Expressed Gratitude   Plan and Referrals   Plan/Referrals Continue Support (comment)  (as needed)

## 2023-03-22 NOTE — PROGRESS NOTES
Physical Therapy      [x] daily progress note       [] discharge       Patient Name:  Mago Tay   :  1945 MRN: [id number]  Room:  [address] [place] [address] Date of Admission: 3/18/2023  Rehabilitation Diagnosis:   Nontraumatic subdural hemorrhage, unspecified [I62.00]  Glioblastoma ([facility] [address] [C71.9]  Subdural hematoma, nontraumatic ([facility] [address] [I62.00]       Date 3/22/2023       Day of ARU Week:  5   Time IN/OUT 5804-2627   Individual Tx Minutes 60   TOTAL Tx Time Mins 60   Variance Time    Variance Time []   Refusal due to:     []   Medical hold/reason:    []   Illness   []   Off Unit for test/procedure  []   Extra time needed to complete task  []   Therapeutic need  []   Other (specify):   Restrictions Restrictions/Precautions  Restrictions/Precautions: Fall Risk, General Precautions, Seizure (Left side neglect.)      Communication with other providers: [x]   OK to see per nursing:     []   Spoke with team member regarding:      Subjective observations and cognitive status: Pt resting in bed with spouse present; Pt pleasant and willing to participate. Easily distracted from task req cues, also cues for spatial awareness due to visual deficits. Pt pleasant but perseverates on wanting to lay back down due to fatigue. Pain level/location: 0/10       Location:    Discharge recommendations  Anticipated discharge date:  tbd  Destination: []home alone   []home alone with assist PRN     [x] home w/ family      [] Continuous supervision  []SNF    [] Assisted living     [] Other:  Continued therapy: [x]C PT  []OUTPATIENT  PT   [] No Further PT  []SNF PT  Caregiver training recommended: []Yes  [] No   Equipment needs: none anticipated     Bed Mobility:           []   Pt received out of bed   Supine --> Sit:  Supervision  Sit --> Supine:  Supervision  Bed features used:    [] HOB elevated      [] Bed rail                                    [x] No     Transfers:    Sit--> Stand:  SBA  Stand --> Sit:   SBA  Cues for hand placement. Proper use of assistive device/adaptive equipment  []   Stair training/Advanced mobility safety and technique  []   Reinforced patient's precautions/mobility while maintaining precautions  []   Postural awareness  []   Family/caregiver training  [x]   Progress was updated and reviewed with patient  this date. []   Other:       Treatment Plan for Next Session: dynamic balance, walker safety with transfers and ambulation. Gait training with focus on improved gait quality.          Treatment/Activity Tolerance:   [x] Tolerated treatment with no adverse effects    [] Patient limited by fatigue  [] Patient limited by pain   [] Patient limited by medical complications:    [] Adverse reaction to Tx:   [] Significant change in status    Safety:       [x]  bed alarm set    []  chair alarm set    []  Pt refused alarms                []  Telesitter activated      [x]  Gait belt used during tx session      [] Call light and belongings in reach       [] Other      Number of Minutes/Billable Intervention  Gait Training 24   Therapeutic Exercise    Neuro Re-Ed    Therapeutic Activity 36   Wheelchair Propulsion    Group    Other:    TOTAL 60         Social History  Social/Functional History  Lives With: Spouse (Lives with Tania Russo - Wife (in good health))  Type of Home: House  Home Layout: One level  Home Access: Stairs to enter without rails  Entrance Stairs - Number of Steps: 1 step to get into back door (Pt usually goes in this entrance), level entry to front door  Bathroom Shower/Tub: Walk-in shower  Bathroom Toilet: Standard  Bathroom Equipment: Grab bars in shower, Grab bars around toilet  Bathroom Accessibility: Accessible  Home Equipment: Walker, rolling, Wheelchair-manual, Grab bars, Reacher  Has the patient had two or more falls in the past year or any fall with injury in the past year?: Yes (Pt states 4 falls in the last year without significant injury.)  Receives Help From: Family  ADL Assistance: [address] [facility]

## 2023-03-22 NOTE — PROGRESS NOTES
Occupational Therapy  Physical Rehabilitation: OCCUPATIONAL THERAPY     [x] daily progress note       [] discharge       Patient Name:  Brissa Hamilton   :  1945 MRN: [id number]  Room:  [address] [place] [address] Date of Admission: 3/18/2023  Rehabilitation Diagnosis:   Nontraumatic subdural hemorrhage, unspecified [I62.00]  Glioblastoma ([facility] [address] [C71.9]  Subdural hematoma, nontraumatic ([facility] [address] [I62.00]       Date 3/22/2023       Day of ARU Week:  5   Time IN/OUT 1405/1505   Individual Tx Minutes 60   Group Tx Minutes    Co-Treat Minutes    Concurrent Tx Minutes    TOTAL Tx Time Mins 60   Variance Time    Variance Time []   Refusal due to:     []   Medical hold/reason:    []   Illness   []   Off Unit for test/procedure  []   Extra time needed to complete task  []   Therapeutic need  []   Other (specify):   Restrictions Restrictions/Precautions: Fall Risk, General Precautions, Seizure (Left side neglect.)         Communication with other providers: [x]   OK to see per nursing:     []   Spoke with team member regarding:      Subjective observations and cognitive status: Pt in bed on approach, pleasant and agreeable to tx session. Easily distracted requiring redirection throughout session     Pain level/location:    /10       Location: Denied   Discharge recommendations  Anticipated discharge date:  TBD  Destination: []home alone   []home alone w assist prn   [] home w/ family    [x] Continuous supervision       []SNF    [] Assisted living     [] Other:   Continued therapy: [x][facility] OT  []OUTPATIENT  OT   [] No Further OT  Equipment needs: Brissa Hamilton requires the assistance of a shower chair to successfully and safely complete bathing activities necessary due to reduced balance, endurance, need for ADL assist, and LE weakness and reduced ROM. These tasks cannot be safely completed without this device and would place Brissa Hamilton at greater risk of falls.       Toileting:   Denied need       Bed Mobility:           []   Pt received out of bed   Supine --> Sit:  Supervision  Sit --> Supine:  Supervision    Transfers:    Sit--> Stand:  CGA  Stand --> Sit:   CGA  Stand-Pivot:   CGA  Other:    Assistive device required for transfer:   RW      Additional Therapeutic activities/exercises completed this date:     []   ADL Training   []   Balance/Postural training     [x]   Bed/Transfer Training   [x]   Endurance Training   [x]   Neuromuscular Re-ed: To address L neglect and LUE FMC, pt completed the following tabletop ax:  -Manipulating 4 medium-small size nuts and bolts; pt required extended time and repeated cues for improved performance c tip pinch. Also required education on compensatory strategy to rely on sense of touch d/t low vision   -Manipulating 10 small pegs on/off small pegboard. Again required cue for compensatory strategy to use R hand to feel for hole d/t low vision; repeated cues to attempt shifting or rotating peg c L hand instead of using R hand. Required extended time to complete task   []   Nu-step:  Time:        Level:         #Steps:       []   Rebounder:    []  Seated     []  Standing        []   Supine Ther Ex (reps/sets):     []   Seated Ther Ex (reps/sets):     [x]   Standing Ther Ex (reps/sets): To assess scapular strength pt placed L hand on wall above head however pt unable to sustain once lifting hand off wall; scapular weakness also observed during tabletop ax requiring pt to perform sustained unsupported reaching; educated pt on POC to incorporate more scapular strengthening    []   Other:      Comments: All intervention performed to increase pt's endurance, ax tolerance, balance, and I c ADLs/IADLs and functional transfers/mobility.        Patient/Caregiver Education and Training:   []   [employer] Equipment Use  [x]   Bed Mobility/Transfer Technique/Safety  []   Energy Conservation Tips  []   Family training  [x]   Postural Awareness  [x]   Safety During Functional Activities  []   Reinforced Patient's Precautions

## 2023-03-22 NOTE — PATIENT CARE CONFERENCE
with reduced assist  [x] Pt will improve ADL's with use of adaptive equipment with reduced assist  [] Pt will improve pain mgmt for maximum participation in tx program  [] Pt will improve communication to get basic needs met on unit  [] Pt will improve swallowing for safe diet advancement with use of strategies  [x]  Plan for discharge to home. [x]  Overall team goal being targeted this week: Improve attn to neglected side in [facility]     Patient Strengths: Family support, Motivated, Cooperative, and Pleasant    Justification for Continued Stay  Based on my medical assessment of the patient and review of information from the interdisciplinary team as part of this weekly team conference, the patient continues to meet the following criteria for IRF level of care: The patient requires active and ongoing intervention of multiple therapy disciplines  The patient requires and intensive rehabilitation therapy program  The patient requires continued physician supervision by a rehabilitation physician  The patient requires 24 hours rehab nursing care  The patient requires an intensive and coordinated interdisciplinary team approach to the delivery of rehabilitative care.      Assessment/Plan   [x]  The patient is making good progression towards their long term goals and is actively participating in and has a reasonable expectation to continue to benefit from the intensive rehabilitation therapy program   []  The estimated discharge date has been changed from initial team conference due to:   []  The estimated discharge destination has been changed from initial team conference due to:         Ongoing tx following discharge: [x][facility] PT OT, [address]    []OUTPATIENT     [] No Further Treatment     [] Family/Caregiver Training  []  Transitional Living Arrangement   [] Home Assessment (date  )     [] Family Conference   []  Therapeutic Pass       []  Other: (specify)    Estimated Discharge Date: 3/28/23    Estimated Discharge Destination: []home alone   []home alone with assist prn  []Continuous supervision [x]Return home with s/o/spouse/family   [] Assisted living    []SNF     Team members participating in today's conference. [x] Nilo Benitez, Medical Director  [x] Aric Carter DPT,       [x] Anand Adam, SANJUANITA Nurse Manager     []  Tamara Boo, PT  [x]  Perla Ryenoso, PT       [x] Fawn Chen, OT   [] Bijan Solis, OT  [] Harman Patton, OT     [x]  Arturo Alvarez, SLP    []  Anil Bella, CHANCE   [] Deangelo Beck, SLP      []Bianca Herrera Kind,   [x]Annika Rivers MSW, LSW,      [] Augustina Fernando, SANJUANITA   [x] Saroj Becker, SANJUANITA    [] Nichole Vitale RN    [] Coco Woody RN [] Karlo Rutherford, SANJUANITA       I have led this Team Conference and agree with the plan, Nilo Benitez MD, 3/23/2023, 12:55 PM  Goals have been updated to reflect recent status.     Team conference note transcribed this date by: Kevin Villavicencio [place], [id number] [facility], Therapy Coordinator

## 2023-03-22 NOTE — PROGRESS NOTES
as Pt in significant forward flexion to complete, cues to reach around back instead. CARE Score: 4  Discharge Goal: Supervision or touching assistance    UB Dressing: Upper Body Dressing  Assistance Needed: Partial/moderate assistance  Comment: Pt able to thread BUEs into shirt but required cue to get L arm completely in. Pt required assist getting tshirt and sweatshirt over his head and pulled down in the back. CARE Score: 3  Discharge Goal: Supervision or touching assistance         LB Dressing: Lower Body Dressing  Assistance Needed: Partial/moderate assistance  Comment: Pt put both legs in one leg hole of depends and pulled up requiring cue and assist from therapist to fix. Pt able to manage depends over hips in stance with CGA. Pt then donned sweatpants only on right side requiring verbal cues to perform on left side. Pt able to manage up over hips with slight assist to untwist.  CARE Score: 3  Discharge Goal: Supervision or touching assistance    Donning and [place] Footwear: Putting On/Taking Off Footwear  Assistance Needed: Supervision or touching assistance  Comment: SBA/Sup; Pt sat in w/c to don personal socks and tennis shoes with one verbal cue as Pt only put sock on [facility] to L foot and attempted putting shoe on. CARE Score: 4  Discharge Goal: Independent      Toiletin [address] needed: Supervision or touching assistance  Comment: CGA in stance to manage depends; Pt sat to complete episode of urination and Pt thought he had a BM, however he did not. Was able to complete bowel hygiene in stance with CGA. CARE Score: 4  Discharge Goal: Supervision or touching assistance      Toilet Transfers: Toilet Transfer  Assistance needed: Partial/moderate assistance  Comment: Min A with 2WW and grab bars. Pt required Max verbal cues for safe placement of walker and body/hands.   CARE Score: 3  Discharge Goal: Supervision or touching assistance    Physical Therapy:   Short Term Goals  Time needed assist to lift leg out of car, complete rotation of body to edge of seat with both feet on floor, then mod assist to stand and pivot to w/c with flexed posture, shuffling steps with leaning to R.  CARE Score: 2  Discharge Goal: Supervision or touching assistance    Ambulation:    Walking Ability  Does the Patient Walk?: Yes     Walk 10 Feet  Assistance Needed: Supervision or touching assistance  Comment: CG with 2ww, short/shuffling steps, trunk and LE flexion, walks 1' behind walker  CARE Score: 4  Discharge Goal: Supervision or touching assistance     Walk 50 Feet with Two Turns  Assistance Needed: Partial/moderate assistance  Comment: 2ww,deviations increase with distance and pt then begins to have body off center in walker toward L, min path deviation, decreased lateral control of balance on turns,  CARE Score: 3  Discharge Goal: Supervision or touching assistance     Walk 150 Feet  Assistance Needed: Partial/moderate assistance  Comment: min assist with 2ww, deviations continue to degrade with increased distance and if not cued, gets up to 2' behind walker with increased trunk flexion, body position so far to L that L foot is on outside of walker  CARE Score: 3  Discharge Goal: Supervision or touching assistance     Walking 10 Feet on Uneven Surfaces  Assistance Needed: Partial/moderate assistance  Comment: min assist with 2ww. Pt was cued prior to performance for attention to task and to try to decrease impulsivity due to high risk of falling  CARE Score: 3  Discharge Goal: Supervision or touching assistance     1 Step (Curb)  Assistance Needed: Partial/moderate assistance  Comment: mod assist with 2ww due to impulsivity and decreased depth perception, placing walker on curb far too early with LOB requiring mod assist to recover, bringing 2ww back to start position, then ascending with min assist for balance. Pt again on descent placed walker while feet are 2-1/2' from edge.   CARE Score: 3  Discharge

## 2023-03-22 NOTE — FLOWSHEET NOTE
Physical Rehabilitation: PHYSICAL THERAPY     [x] daily progress note       [] discharge       Patient Name:  Bernice Field   :  1945 MRN: [id number]  Room:  [address] [place] [address] Date of Admission: 3/18/2023    Rehabilitation Diagnosis:     Nontraumatic subdural hemorrhage, unspecified [I62.00]  Glioblastoma ([facility] [address] [C71.9]  Subdural hematoma, nontraumatic ([facility] [address] [I62.00]    Date  3/22/2023   TIMES IN/OUT   2372-1169   Group Tx Minutes 60   TOTAL Tx time seen 60   Restrictions/Precautions Restrictions/Precautions  Restrictions/Precautions: Fall Risk, General Precautions, Seizure (Left side neglect.)      Current Diet/Swallowing Issues ADULT DIET; Regular  ADULT ORAL NUTRITION SUPPLEMENT; Dinner; Low Calorie/High Protein Oral Supplement   Communication with other providers: [x] Ok to see per nursing    [] Medical hold and reason  [] Spoke with (team    member) regarding   Subjective observations: Pt agreeable to group session. Pain level/location: 0/10    Alarm placed/where? Fall Risk  [] Pt taken to DR for lunch with nsg present   [x] Pt taken back to room with chair alarm in place         Group Activity:  Total time in group = 60 min   Exercise / Jodell Field participated in exercise and discussion on benefits and precautions during exercise. This provided the opportunity to have fun, build camaraderie, increase endurance, improve breathing techniques, balance, and strength. Bernice Field performed a variety of seated activities as able, with focus on technique and safety during exercise. Also focused on warm-up, warm-down, endurance monitoring, strengthening & strategies, function, safety, and general social & communication opportunities with other group members.       Functional areas targeted:   [] Communication [x] Memory  [x] Coordination    [] Kitchen Safety [x] Problem Solving  [x] Strengthening     [x] Attention  [x] Endurance   [] Mobility    [x] Awareness/Insight [] Balance  [] Transfers  [x] Safety   [] Other (specify)  Participation:  [x] Actively participated        Education completed: benefits of exercise, signs and symptoms of exercise intolerance. Cognitive fx during this group: pt did well with following instructions and paying attention during group.    Family present: no         Assessment / Impression                                                          Treatment/Activity Tolerance:   [x] Tolerated Treatment well:     [] Patient limited by fatigue/pain:       [] Patient limited by medical complications:    [] Adverse Reaction to Tx:   [] Significant change in status      Number of Minutes/Billable Intervention  Gait Training    Therapeutic Exercise    Neuro Re-Ed    Therapeutic Activity    Wheelchair Propulsion    Group 60   Other:    TOTAL 60         Social History  Social/Functional History  Lives With: Spouse (Lives with Dakota Goodman - Wife (in good health))  Type of Home: House  Home Layout: One level  Home Access: Stairs to enter without rails  Entrance Stairs - Number of Steps: 1 step to get into back door (Pt usually goes in this entrance), level entry to front door  Bathroom Shower/Tub: Walk-in shower  Bathroom Toilet: Standard  Bathroom Equipment: Grab bars in shower, Grab bars around toilet  Bathroom Accessibility: Accessible  Home Equipment: [address], rolling, Wheelchair-manual, Grab bars, Reacher  Has the patient had two or more falls in the past year or any fall with injury in the past year?: Yes (Pt states 4 falls in the last year without significant injury.)  Receives Help From: Family  ADL Assistance: Independent  Homemaking Assistance: Needs assistance  Homemaking Responsibilities: Yes  Meal Prep Responsibility: No (Wife completes.)  Laundry Responsibility: No (Wife completes.)  Cleaning Responsibility: No (Hired help.)  Bill Paying/Finance Responsibility: Secondary (Shared responsibility with wife.)  Shopping Responsibility: No (Wife completes.)  Dependent Care Responsibility:

## 2023-03-23 LAB
ANION GAP SERPL CALCULATED.3IONS-SCNC: 8 MMOL/L (ref 4–16)
BASOPHILS ABSOLUTE: 0 K/CU MM
BASOPHILS RELATIVE PERCENT: 0.1 % (ref 0–1)
BUN SERPL-MCNC: 32 MG/DL (ref 6–23)
CALCIUM SERPL-MCNC: 8.5 MG/DL (ref 8.3–10.6)
CHLORIDE BLD-SCNC: 102 MMOL/L (ref 99–110)
CO2: 25 MMOL/L (ref 21–32)
CREAT SERPL-MCNC: 0.9 MG/DL (ref 0.9–1.3)
DIFFERENTIAL TYPE: ABNORMAL
EOSINOPHILS ABSOLUTE: 0 K/CU MM
EOSINOPHILS RELATIVE PERCENT: 0.3 % (ref 0–3)
GFR SERPL CREATININE-BSD FRML MDRD: >60 ML/MIN/1.73M2
GLUCOSE SERPL-MCNC: 110 MG/DL (ref 70–99)
HCT VFR BLD CALC: 37.6 % (ref 42–52)
HEMOGLOBIN: 12.9 GM/DL (ref 13.5–18)
IMMATURE NEUTROPHIL %: 0.4 % (ref 0–0.43)
LYMPHOCYTES ABSOLUTE: 0.8 K/CU MM
LYMPHOCYTES RELATIVE PERCENT: 12 % (ref 24–44)
MCH RBC QN AUTO: 33.9 PG (ref 27–31)
MCHC RBC AUTO-ENTMCNC: 34.3 % (ref 32–36)
MCV RBC AUTO: 98.7 FL (ref 78–100)
MONOCYTES ABSOLUTE: 0.9 K/CU MM
MONOCYTES RELATIVE PERCENT: 13.7 % (ref 0–4)
NUCLEATED RBC %: 0 %
PDW BLD-RTO: 13.2 % (ref 11.7–14.9)
PLATELET # BLD: 97 K/CU MM (ref 140–440)
PMV BLD AUTO: 9.1 FL (ref 7.5–11.1)
POTASSIUM SERPL-SCNC: 4.6 MMOL/L (ref 3.5–5.1)
RBC # BLD: 3.81 M/CU MM (ref 4.6–6.2)
SEGMENTED NEUTROPHILS ABSOLUTE COUNT: 4.9 K/CU MM
SEGMENTED NEUTROPHILS RELATIVE PERCENT: 73.5 % (ref 36–66)
SODIUM BLD-SCNC: 135 MMOL/L (ref 135–145)
TOTAL IMMATURE NEUTOROPHIL: 0.03 K/CU MM
TOTAL NUCLEATED RBC: 0 K/CU MM
WBC # BLD: 6.7 K/CU MM (ref 4–10.5)

## 2023-03-23 PROCEDURE — 94761 N-INVAS EAR/PLS OXIMETRY MLT: CPT

## 2023-03-23 PROCEDURE — 94150 VITAL CAPACITY TEST: CPT

## 2023-03-23 PROCEDURE — 6360000002 HC RX W HCPCS: Performed by: PHYSICAL MEDICINE & REHABILITATION

## 2023-03-23 PROCEDURE — 97110 THERAPEUTIC EXERCISES: CPT

## 2023-03-23 PROCEDURE — 36415 COLL VENOUS BLD VENIPUNCTURE: CPT

## 2023-03-23 PROCEDURE — 97535 SELF CARE MNGMENT TRAINING: CPT

## 2023-03-23 PROCEDURE — 1280000000 HC REHAB R&B

## 2023-03-23 PROCEDURE — 97116 GAIT TRAINING THERAPY: CPT

## 2023-03-23 PROCEDURE — 85025 COMPLETE CBC W/AUTO DIFF WBC: CPT

## 2023-03-23 PROCEDURE — 6370000000 HC RX 637 (ALT 250 FOR IP): Performed by: PHYSICAL MEDICINE & REHABILITATION

## 2023-03-23 PROCEDURE — 97530 THERAPEUTIC ACTIVITIES: CPT

## 2023-03-23 PROCEDURE — 80048 BASIC METABOLIC PNL TOTAL CA: CPT

## 2023-03-23 RX ADMIN — SODIUM CHLORIDE 1 G: 1 TABLET ORAL at 10:54

## 2023-03-23 RX ADMIN — METHYLPHENIDATE HYDROCHLORIDE 10 MG: 5 TABLET ORAL at 08:29

## 2023-03-23 RX ADMIN — Medication 6 MG: at 20:24

## 2023-03-23 RX ADMIN — FAMOTIDINE 20 MG: 20 TABLET ORAL at 20:24

## 2023-03-23 RX ADMIN — DEXAMETHASONE 4 MG: 4 TABLET ORAL at 08:29

## 2023-03-23 RX ADMIN — LEVETIRACETAM 500 MG: 500 TABLET, FILM COATED ORAL at 20:24

## 2023-03-23 RX ADMIN — SENNOSIDES 8.6 MG: 8.6 TABLET, COATED ORAL at 20:26

## 2023-03-23 RX ADMIN — LEVETIRACETAM 500 MG: 500 TABLET, FILM COATED ORAL at 08:35

## 2023-03-23 RX ADMIN — FAMOTIDINE 20 MG: 20 TABLET ORAL at 08:29

## 2023-03-23 RX ADMIN — FLUOXETINE HYDROCHLORIDE 10 MG: 10 CAPSULE ORAL at 08:28

## 2023-03-23 RX ADMIN — LOSARTAN POTASSIUM 50 MG: 25 TABLET, FILM COATED ORAL at 08:30

## 2023-03-23 RX ADMIN — SENNOSIDES 8.6 MG: 8.6 TABLET, COATED ORAL at 08:30

## 2023-03-23 ASSESSMENT — PAIN SCALES - GENERAL
PAINLEVEL_OUTOF10: 0
PAINLEVEL_OUTOF10: 0
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
## 2023-03-23 NOTE — PROGRESS NOTES
08/28/2022     Lab Results   Component Value Date    CREATININE 0.8 (L) 03/20/2023    BUN 21 03/20/2023     03/20/2023    K 4.1 03/20/2023     03/20/2023    CO2 24 03/20/2023     Lab Results   Component Value Date    ALT 20 03/15/2023    AST 15 03/15/2023    ALKPHOS 53 03/15/2023    BILITOT 0.5 03/15/2023       Expected length of stay  prior to a supervised level of function for discharge home with a walker and C OT/PT is ***    Recommendations:    ***

## 2023-03-23 NOTE — PROGRESS NOTES
Physical Therapy  [x] daily progress note       [] discharge       Patient Name:  Jaiad Franklin   :  1945 MRN: [id number]  Room:  [address] [place] [address] Date of Admission: 3/18/2023  Rehabilitation Diagnosis:   Nontraumatic subdural hemorrhage, unspecified [I62.00]  Glioblastoma ([facility] [address] [C71.9]  Subdural hematoma, nontraumatic ([facility] [address] [I62.00]       Date 3/23/2023       Day of ARU Week:  6   Time IN//1030, 1325/1425   Individual Tx Minutes 60,60   Group Tx Minutes    Co-Treat Minutes    Concurrent Tx Minutes    TOTAL Tx Time Mins 120   Variance Time    Variance Time []   Refusal due to:     []   Medical hold/reason:    []   Illness   []   Off Unit for test/procedure  []   Extra time needed to complete task  []   Therapeutic need  []   Other (specify):   Restrictions Restrictions/Precautions  Restrictions/Precautions: Fall Risk, General Precautions, Seizure (Left side neglect.)      Interdisciplinary communication [x]   Cleared for therapy per nursing     []   RN notified about issues during session  []   RN updated on pt performance  []   Spoke with   []   Spoke with OT  []   Spoke with MD  []   Other:    Subjective observations and cognitive status: AM:Pt in semi-fowlers. Pt verbalizes that he is anticipatory of finding out discharge date  PM: Pt disappointed in his discharge date, called wife to share with her and she was supportive of the date. Increase distractability in pm as compared to am. Pt had spilled urinal and was wet. Initiated session by walking to bathroom and changing into gown as OT to do shower with pt immediately following PT session.       Pain level/location:  0  /10       Location:    Discharge recommendations  Anticipated discharge date:  3/28  Destination: []home alone   []home alone with assist PRN     [x] home w/ family      [] Continuous supervision  []SNF    [] Assisted living     [] Other:  Continued therapy: [x]C PT  []OUTPATIENT PT   [] No Further PT  []SNF PT  Caregiver

## 2023-03-24 PROCEDURE — 6360000002 HC RX W HCPCS: Performed by: PHYSICAL MEDICINE & REHABILITATION

## 2023-03-24 PROCEDURE — 97530 THERAPEUTIC ACTIVITIES: CPT

## 2023-03-24 PROCEDURE — 1280000000 HC REHAB R&B

## 2023-03-24 PROCEDURE — 6370000000 HC RX 637 (ALT 250 FOR IP): Performed by: PHYSICAL MEDICINE & REHABILITATION

## 2023-03-24 PROCEDURE — 97112 NEUROMUSCULAR REEDUCATION: CPT

## 2023-03-24 PROCEDURE — 94150 VITAL CAPACITY TEST: CPT

## 2023-03-24 PROCEDURE — 97116 GAIT TRAINING THERAPY: CPT

## 2023-03-24 PROCEDURE — 94761 N-INVAS EAR/PLS OXIMETRY MLT: CPT

## 2023-03-24 RX ADMIN — ACETAMINOPHEN 650 MG: 325 TABLET ORAL at 20:01

## 2023-03-24 RX ADMIN — SENNOSIDES 8.6 MG: 8.6 TABLET, COATED ORAL at 20:01

## 2023-03-24 RX ADMIN — FLUOXETINE HYDROCHLORIDE 10 MG: 10 CAPSULE ORAL at 10:27

## 2023-03-24 RX ADMIN — LEVETIRACETAM 500 MG: 500 TABLET, FILM COATED ORAL at 10:27

## 2023-03-24 RX ADMIN — SODIUM CHLORIDE 1 G: 1 TABLET ORAL at 10:27

## 2023-03-24 RX ADMIN — SODIUM CHLORIDE 1 G: 1 TABLET ORAL at 17:12

## 2023-03-24 RX ADMIN — FAMOTIDINE 20 MG: 20 TABLET ORAL at 10:27

## 2023-03-24 RX ADMIN — SODIUM CHLORIDE 1 G: 1 TABLET ORAL at 13:07

## 2023-03-24 RX ADMIN — DEXAMETHASONE 4 MG: 4 TABLET ORAL at 10:28

## 2023-03-24 RX ADMIN — LEVETIRACETAM 500 MG: 500 TABLET, FILM COATED ORAL at 20:01

## 2023-03-24 RX ADMIN — Medication 6 MG: at 20:01

## 2023-03-24 RX ADMIN — METHYLPHENIDATE HYDROCHLORIDE 10 MG: 5 TABLET ORAL at 10:27

## 2023-03-24 RX ADMIN — METHYLPHENIDATE HYDROCHLORIDE 10 MG: 5 TABLET ORAL at 13:07

## 2023-03-24 RX ADMIN — FAMOTIDINE 20 MG: 20 TABLET ORAL at 20:01

## 2023-03-24 RX ADMIN — SENNOSIDES 8.6 MG: 8.6 TABLET, COATED ORAL at 10:27

## 2023-03-24 RX ADMIN — LOSARTAN POTASSIUM 50 MG: 25 TABLET, FILM COATED ORAL at 10:28

## 2023-03-24 ASSESSMENT — PAIN SCALES - GENERAL: PAINLEVEL_OUTOF10: 1
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
## 2023-03-24 NOTE — DISCHARGE INSTRUCTIONS
Pt is discharging to home with [address] [facility], [address], [facility], [address] [facility]  [phone]  A representative from [facility] will contact you at home to schedule your home care needs

## 2023-03-24 NOTE — PROGRESS NOTES
Occupational Therapy  Physical Rehabilitation: OCCUPATIONAL THERAPY     [x] daily progress note       [] discharge       Patient Name:  Anaya Fofana   :  1945 MRN: [id number]  Room:  [address] [place] [address] Date of Admission: 3/18/2023  Rehabilitation Diagnosis:   Nontraumatic subdural hemorrhage, unspecified [I62.00]  Glioblastoma ([facility] [address] [C71.9]  Subdural hematoma, nontraumatic ([facility] [address] [I62.00]       Date 3/24/2023       Day of ARU Week:  7   Time IN/OUT 1403/1508   Individual Tx Minutes 65   Group Tx Minutes    Co-Treat Minutes    Concurrent Tx Minutes    TOTAL Tx Time Mins 65   Variance Time +5    Variance Time []   Refusal due to:     []   Medical hold/reason:    []   Illness   []   Off Unit for test/procedure  [x]   Extra time needed to complete task: educating wife and pt about shower chair options to fit small shower stall  []   Therapeutic need  []   Other (specify):   Restrictions Restrictions/Precautions: Fall Risk, General Precautions, Seizure (Left side neglect.)         Communication with other providers: [x]   OK to see per nursing:     []   Spoke with team member regarding:      Subjective observations and cognitive status: Pt in recliner on approach, pleasant and agreeable to tx session. Easily distracted requiring frequent redirection. Pain level/location:    /10       Location: Denied   Discharge recommendations  Anticipated discharge date:    Destination: []home alone   []home alone w assist prn   [] home w/ family    [x] Continuous supervision       []SNF    [] Assisted living     [] Other:   Continued therapy: [x]C OT  []OUTPATIENT  OT   [] No Further OT  Equipment needs: Anaya Fofana requires the assistance of a shower chair to successfully and safely complete bathing activities necessary due to reduced balance, endurance, need for ADL assist, and LE weakness and reduced ROM.  These tasks cannot be safely completed without this device and would place Anaya Fofana at greater risk of falls toileting with supervision. Long Term Goal 7: Pt will complete functional transfers (bed, chair, shower, toilet) with DME PRN and supervision. Long Term Goal 8: Pt will perform therex/therax to facilitate an increase in strength/endurance with emphasis on dynamic standing balance/tolerance > 8 mins with supervision.:        Plan of Care                                                                              Times per week: 5 days per week for a minimum of 60 minutes/day plus group as appropriate for 60 minutes. Treatment to include Occupational Therapy Plan  Current Treatment Recommendations: Strengthening, ROM, Balance training, Functional mobility training, Endurance training, Neuromuscular re-education, Pain management, Safety education & training, Patient/Caregiver education & training, Equipment evaluation, education, & procurement, Positioning, Self-Care / ADL, Coordination training, Cognitive/Perceptual training    Electronically signed by   Андрей Ibrahim MS, OTR/L  License #[id number]  3/24/2023, 2:50 PM

## 2023-03-24 NOTE — PROGRESS NOTES
position of walker and body in walker  CARE Score: 4  Discharge Goal: Supervision or touching assistance     4 Steps  Assistance Needed: Supervision or touching assistance  Comment: CG with B rails  Reason if not Attempted: Not attempted due to medical condition or safety concerns  CARE Score: 4  Discharge Goal: Supervision or touching assistance     12 Steps  Assistance Needed: Supervision or touching assistance  Comment: CG, B rails, self corrected sequence multiple times, improved L foot placement and L UE awareness on rail  Reason if not Attempted: Not applicable  CARE Score: 4  Discharge Goal: Not Applicable       Wheelchair:  w/c Ability: Wheelchair Ability  Uses a Wheelchair and/or Scooter?: No                Balance:        Object: Picking Up Object  Assistance Needed: Supervision or touching assistance  Comment: SBA with 2ww and reacher and CG 2ww without reacher  Reason if not Attempted: Not attempted due to medical condition or safety concerns  CARE Score: 4  Discharge Goal: Supervision or touching assistance    I      Exam:    Blood pressure 110/88, pulse 60, temperature 98.1 °F (36.7 °C), temperature source Oral, resp. rate 20, height 6' (1.829 m), weight 214 lb 15.2 oz (97.5 kg), SpO2 95 %. General: ***    HEENT: ***    Pulmonary: ***    Cardiac: ***    Abdomen: Patient's abdomen is soft and nondistended. Bowel sounds were present throughout. There was no rebound, guarding or masses noted. Upper extremities: ***    Lower extremities: ***    Sitting balance was ***. Standing balance was ***.     Lab Results   Component Value Date    WBC 6.7 03/23/2023    HGB 12.9 (L) 03/23/2023    HCT 37.6 (L) 03/23/2023    MCV 98.7 03/23/2023    PLT 97 (L) 03/23/2023     Lab Results   Component Value Date    INR 0.97 03/15/2023    INR 0.92 08/28/2022    PROTIME 12.6 03/15/2023    PROTIME 11.9 08/28/2022     Lab Results   Component Value Date    CREATININE 0.9 03/23/2023    BUN 32 (H) 03/23/2023     03/23/2023    K 4.6 03/23/2023     03/23/2023    CO2 25 03/23/2023     Lab Results   Component Value Date    ALT 20 03/15/2023    AST 15 03/15/2023    ALKPHOS 53 03/15/2023    BILITOT 0.5 03/15/2023       Expected length of stay  prior to a supervised level of function for discharge home with a walker and HHC OT/PT is ***    Recommendations:    ***    Counseling and Coordination of Care: In care conference today I met with the patient's OT, PT, RN and . We discussed the patient's problems, progress and prognosis. Disposition issues were clarified and plans were established for ongoing rehabilitation efforts beyond the ARU stay. I reviewed this information with the patient during a second distinct visit with the patient. More than half of the total time of 35 minutes spent with the patient involved counseling and coordination of care.

## 2023-03-24 NOTE — DISCHARGE INSTR - ACTIVITY
Pt is discharging to home with [address] [facility], [address], [facility], [address] [facility]  [phone]  A representative from [facility] will contact you at home to schedule your home care needs

## 2023-03-25 PROCEDURE — 94761 N-INVAS EAR/PLS OXIMETRY MLT: CPT

## 2023-03-25 PROCEDURE — 97110 THERAPEUTIC EXERCISES: CPT

## 2023-03-25 PROCEDURE — 1280000000 HC REHAB R&B

## 2023-03-25 PROCEDURE — 97116 GAIT TRAINING THERAPY: CPT

## 2023-03-25 PROCEDURE — 97535 SELF CARE MNGMENT TRAINING: CPT

## 2023-03-25 PROCEDURE — 6360000002 HC RX W HCPCS: Performed by: PHYSICAL MEDICINE & REHABILITATION

## 2023-03-25 PROCEDURE — 97530 THERAPEUTIC ACTIVITIES: CPT

## 2023-03-25 PROCEDURE — 94150 VITAL CAPACITY TEST: CPT

## 2023-03-25 PROCEDURE — 6370000000 HC RX 637 (ALT 250 FOR IP): Performed by: PHYSICAL MEDICINE & REHABILITATION

## 2023-03-25 PROCEDURE — 97112 NEUROMUSCULAR REEDUCATION: CPT

## 2023-03-25 RX ADMIN — FLUOXETINE HYDROCHLORIDE 10 MG: 10 CAPSULE ORAL at 08:54

## 2023-03-25 RX ADMIN — SODIUM CHLORIDE 1 G: 1 TABLET ORAL at 12:21

## 2023-03-25 RX ADMIN — FAMOTIDINE 20 MG: 20 TABLET ORAL at 08:54

## 2023-03-25 RX ADMIN — ACETAMINOPHEN 650 MG: 325 TABLET ORAL at 20:14

## 2023-03-25 RX ADMIN — METHYLPHENIDATE HYDROCHLORIDE 10 MG: 5 TABLET ORAL at 08:53

## 2023-03-25 RX ADMIN — SENNOSIDES 8.6 MG: 8.6 TABLET, COATED ORAL at 20:15

## 2023-03-25 RX ADMIN — SODIUM CHLORIDE 1 G: 1 TABLET ORAL at 16:52

## 2023-03-25 RX ADMIN — METHYLPHENIDATE HYDROCHLORIDE 10 MG: 5 TABLET ORAL at 12:21

## 2023-03-25 RX ADMIN — Medication 6 MG: at 20:14

## 2023-03-25 RX ADMIN — LOSARTAN POTASSIUM 50 MG: 25 TABLET, FILM COATED ORAL at 08:54

## 2023-03-25 RX ADMIN — LEVETIRACETAM 500 MG: 500 TABLET, FILM COATED ORAL at 08:54

## 2023-03-25 RX ADMIN — LEVETIRACETAM 500 MG: 500 TABLET, FILM COATED ORAL at 20:15

## 2023-03-25 RX ADMIN — FAMOTIDINE 20 MG: 20 TABLET ORAL at 20:15

## 2023-03-25 RX ADMIN — SENNOSIDES 8.6 MG: 8.6 TABLET, COATED ORAL at 08:54

## 2023-03-25 RX ADMIN — DEXAMETHASONE 4 MG: 4 TABLET ORAL at 08:54

## 2023-03-25 ASSESSMENT — PAIN SCALES - GENERAL: PAINLEVEL_OUTOF10: 1

## 2023-03-25 ASSESSMENT — PAIN DESCRIPTION - DESCRIPTORS: DESCRIPTORS: ACHING

## 2023-03-25 ASSESSMENT — PAIN DESCRIPTION - LOCATION: LOCATION: BACK

## 2023-03-25 ASSESSMENT — PAIN DESCRIPTION - ORIENTATION: ORIENTATION: LOWER

## 2023-03-25 NOTE — PROGRESS NOTES
Awareness  [x]   Safety During Functional Activities  []   Reinforced Patient's Precautions   []   Progress was updated and reviewed in Rehabtracker with patient and/or family this         date. Treatment Plan for Next Session: Continue OT POC      Assessment: This pt demonstrated a positive response to today's treatment as evidenced by participating. The patient is making progress toward established goals as evidenced by QI scores. Ongoing deficits are observed in the areas of safety, activity tolerance, balance, strength and continued focus on these is recommended.        Treatment/Activity Tolerance:   [x] Tolerated treatment with no adverse effects    [] Patient limited by fatigue  [] Patient limited by pain   [] Patient limited by medical complications:    [] Adverse reaction to Tx:   [] Significant change in status    Safety:       [x]  bed alarm set    []  chair alarm set    []  Pt refused alarms                []  Telesitter activated      [x]  Gait belt used during tx session      []other:       Number of Minutes/Billable Intervention  Therapeutic Exercise 25   ADL Self-care 15   Neuro Re-Ed    Therapeutic Activity 20   Group    Other:    TOTAL 60       Social History  Social/Functional History  Lives With: Spouse (Lives with Abelino Burch - Wife (in good health))  Type of Home: House  Home Layout: One level  Home Access: Stairs to enter without rails  Entrance Stairs - Number of Steps: 1 step to get into back door (Pt usually goes in this entrance), level entry to front door  Bathroom Shower/Tub: Walk-in shower  Bathroom Toilet: Standard  Bathroom Equipment: Grab bars in shower, Grab bars around toilet  Bathroom Accessibility: Accessible  Home Equipment: Walker, rolling, Wheelchair-manual, Grab bars, Reacher  Has the patient had two or more falls in the past year or any fall with injury in the past year?: Yes (Pt states 4 falls in the last year without significant injury.)  Receives Help From: Family  ADL balance/tolerance > 8 mins with supervision.:        Plan of Care                                                                              Times per week: 5 days per week for a minimum of 60 minutes/day plus group as appropriate for 60 minutes.   Treatment to include Occupational Therapy Plan  Current Treatment Recommendations: Strengthening, ROM, Balance training, Functional mobility training, Endurance training, Neuromuscular re-education, Pain management, Safety education & training, Patient/Caregiver education & training, Equipment evaluation, education, & procurement, Positioning, Self-Care / ADL, Coordination training, Cognitive/Perceptual training    Electronically signed by   Loralie Sandifer, OTA,  3/25/2023, 8:13 AM

## 2023-03-25 NOTE — PROGRESS NOTES
Physical Therapy  [x] daily progress note       [] discharge       Patient Name:  Fatmata Mercer   :  1945 MRN: [id number]  Room:  [address] [place] [address] Date of Admission: 3/18/2023  Rehabilitation Diagnosis:   Nontraumatic subdural hemorrhage, unspecified [I62.00]  Glioblastoma ([facility] [address] [C71.9]  Subdural hematoma, nontraumatic ([facility] [address] [I62.00]       Date 3/25/2023       Day of ARU Week:  1   Time IN/-1000   Individual Tx Minutes 60   Group Tx Minutes    Co-Treat Minutes    Concurrent Tx Minutes    TOTAL Tx Time Mins 60   Variance Time    Variance Time []   Refusal due to:     []   Medical hold/reason:    []   Illness   []   Off Unit for test/procedure  []   Extra time needed to complete task  []   Therapeutic need  []   Other (specify):   Restrictions Restrictions/Precautions  Restrictions/Precautions: Fall Risk, General Precautions, Seizure (Left side neglect.)      Communication with other providers: [x]   OK to see per nursing:     []   Spoke with team member regarding:      Subjective observations and cognitive status: Upon arrival pt supine in bed. Pt agreeable to tx.  Pt states \" I would like to work on my core\"     Pain level/location:    /10       Location:    Discharge recommendations  Anticipated discharge date:  3/28/23  Destination: []home alone   []home alone with assist PRN     [x] home w/ family      [] Continuous supervision  []SNF    [] Assisted living     [] Other:   Continued therapy: [x]HHC PT  []OUTPATIENT  PT   [] No Further PT  Equipment needs: 2WW         Bed Mobility:           []   Pt received out of bed   Rolling R/L:  SBA  Scooting:  SBA  Lying --> Sit:  Min A for upper trunk  Sit --> lying:  MI    Transfers:    Sit--> Stand:  Supervision  Stand --> Sit:   Supervsion  Chair-->Bed/Bed --> Chair:   Supervision     Assistive device required for transfer:   RW    Gait:    Distance:  172 ft x 2  Assistance:  SBA  Device:  RW  Gait Quality:  Pt demo a step through pattern initially and as pt

## 2023-03-25 NOTE — PROGRESS NOTES
Henrique Edwards    : 1945  Acct #: [de-identified]  MRN: [id number]              PM&R Progress Note      Admitting diagnosis: ***    Comorbid diagnoses impacting rehabilitation: ***    Chief complaint: ***    Prior (baseline) level of function: Independent. Current level of function:         Current  IRF-[facility] and Goals:   Occupational Therapy:    Short Term Goals  Time Frame for Short Term Goals: STGs=LTGs :   Long Term Goals  Time Frame for Long Term Goals : 12-14 days or until d/c. Long Term Goal 1: Pt will complete oral hygiene and grooming tasks with IND. Long Term Goal 2: Pt will complete total body bathing with AE PRN and supervision. Long Term Goal 3: Pt will complete UB dressing with supervision. Long Term Goal 4: Pt will complete LB dressing with AE PRN and supervision. Long Term Goal 5: Pt will doff/don footwear with AE PRN and Mod I. Additional Goals?: Yes  Long Term Goal 6: Pt will complete toileting with supervision. Long Term Goal 7: Pt will complete functional transfers (bed, chair, shower, toilet) with DME PRN and supervision. Long Term Goal 8: Pt will perform therex/therax to facilitate an increase in strength/endurance with emphasis on dynamic standing balance/tolerance > 8 mins with supervision. :                                       Eating: Eating  Assistance Needed: Independent  Comment: Pt able open packages/containers and eat breakfast IND.   CARE Score: 6  Discharge Goal: Independent       Oral Hygiene: Oral Hygiene  Assistance Needed: Independent  Comment: seated  CARE Score: 6  Discharge Goal: Independent    UB/LB Bathing: Shower/Bathe Self  Assistance Needed: Supervision or touching assistance  Comment: for full shower seated, mod cues for thoroughness  CARE Score: 4  Discharge Goal: Supervision or touching assistance    UB Dressing: Upper Body Dressing  Assistance Needed: Supervision or touching assistance  Comment: with min increased time and cueing for sequencing pt able to 135 03/23/2023    K 4.6 03/23/2023     03/23/2023    CO2 25 03/23/2023     Lab Results   Component Value Date    ALT 20 03/15/2023    AST 15 03/15/2023    ALKPHOS 53 03/15/2023    BILITOT 0.5 03/15/2023       Expected length of stay  prior to a supervised level of function for discharge home with a walker and HHC OT/PT is ***    Recommendations:    ***

## 2023-03-26 VITALS
BODY MASS INDEX: 33.29 KG/M2 | DIASTOLIC BLOOD PRESSURE: 73 MMHG | SYSTOLIC BLOOD PRESSURE: 118 MMHG | TEMPERATURE: 98.2 F | OXYGEN SATURATION: 95 % | HEART RATE: 66 BPM | RESPIRATION RATE: 16 BRPM | WEIGHT: 245.81 LBS | HEIGHT: 72 IN

## 2023-03-26 PROCEDURE — 6370000000 HC RX 637 (ALT 250 FOR IP): Performed by: PHYSICAL MEDICINE & REHABILITATION

## 2023-03-26 PROCEDURE — 1280000000 HC REHAB R&B

## 2023-03-26 PROCEDURE — 94761 N-INVAS EAR/PLS OXIMETRY MLT: CPT

## 2023-03-26 PROCEDURE — 94150 VITAL CAPACITY TEST: CPT

## 2023-03-26 PROCEDURE — 94664 DEMO&/EVAL PT USE INHALER: CPT

## 2023-03-26 PROCEDURE — 6360000002 HC RX W HCPCS: Performed by: PHYSICAL MEDICINE & REHABILITATION

## 2023-03-26 RX ADMIN — METHYLPHENIDATE HYDROCHLORIDE 10 MG: 5 TABLET ORAL at 12:31

## 2023-03-26 RX ADMIN — DEXAMETHASONE 4 MG: 4 TABLET ORAL at 09:33

## 2023-03-26 RX ADMIN — Medication 6 MG: at 20:03

## 2023-03-26 RX ADMIN — LOSARTAN POTASSIUM 50 MG: 25 TABLET, FILM COATED ORAL at 09:33

## 2023-03-26 RX ADMIN — SODIUM CHLORIDE 1 G: 1 TABLET ORAL at 12:31

## 2023-03-26 RX ADMIN — DOCUSATE SODIUM 100 MG: 100 CAPSULE, LIQUID FILLED ORAL at 09:33

## 2023-03-26 RX ADMIN — METHYLPHENIDATE HYDROCHLORIDE 10 MG: 5 TABLET ORAL at 09:41

## 2023-03-26 RX ADMIN — ACETAMINOPHEN 650 MG: 325 TABLET ORAL at 20:04

## 2023-03-26 RX ADMIN — FAMOTIDINE 20 MG: 20 TABLET ORAL at 20:04

## 2023-03-26 RX ADMIN — LEVETIRACETAM 500 MG: 500 TABLET, FILM COATED ORAL at 09:33

## 2023-03-26 RX ADMIN — SODIUM CHLORIDE 1 G: 1 TABLET ORAL at 17:16

## 2023-03-26 RX ADMIN — SODIUM CHLORIDE 1 G: 1 TABLET ORAL at 09:33

## 2023-03-26 RX ADMIN — FAMOTIDINE 20 MG: 20 TABLET ORAL at 09:33

## 2023-03-26 RX ADMIN — SENNOSIDES 8.6 MG: 8.6 TABLET, COATED ORAL at 20:04

## 2023-03-26 RX ADMIN — FLUOXETINE HYDROCHLORIDE 10 MG: 10 CAPSULE ORAL at 09:34

## 2023-03-26 RX ADMIN — LEVETIRACETAM 500 MG: 500 TABLET, FILM COATED ORAL at 20:04

## 2023-03-26 ASSESSMENT — PAIN SCALES - GENERAL: PAINLEVEL_OUTOF10: 0

## 2023-03-27 LAB
ANION GAP SERPL CALCULATED.3IONS-SCNC: 8 MMOL/L (ref 4–16)
BASOPHILS ABSOLUTE: 0 K/CU MM
BASOPHILS RELATIVE PERCENT: 0.2 % (ref 0–1)
BUN SERPL-MCNC: 26 MG/DL (ref 6–23)
CALCIUM SERPL-MCNC: 8.4 MG/DL (ref 8.3–10.6)
CHLORIDE BLD-SCNC: 106 MMOL/L (ref 99–110)
CO2: 22 MMOL/L (ref 21–32)
CREAT SERPL-MCNC: 0.8 MG/DL (ref 0.9–1.3)
DIFFERENTIAL TYPE: ABNORMAL
EOSINOPHILS ABSOLUTE: 0 K/CU MM
EOSINOPHILS RELATIVE PERCENT: 0.5 % (ref 0–3)
GFR SERPL CREATININE-BSD FRML MDRD: >60 ML/MIN/1.73M2
GLUCOSE SERPL-MCNC: 102 MG/DL (ref 70–99)
HCT VFR BLD CALC: 37 % (ref 42–52)
HEMOGLOBIN: 12.7 GM/DL (ref 13.5–18)
IMMATURE NEUTROPHIL %: 0.7 % (ref 0–0.43)
LYMPHOCYTES ABSOLUTE: 0.9 K/CU MM
LYMPHOCYTES RELATIVE PERCENT: 14.5 % (ref 24–44)
MCH RBC QN AUTO: 34 PG (ref 27–31)
MCHC RBC AUTO-ENTMCNC: 34.3 % (ref 32–36)
MCV RBC AUTO: 98.9 FL (ref 78–100)
MONOCYTES ABSOLUTE: 0.8 K/CU MM
MONOCYTES RELATIVE PERCENT: 12.4 % (ref 0–4)
NUCLEATED RBC %: 0 %
PDW BLD-RTO: 13.1 % (ref 11.7–14.9)
PLATELET # BLD: 67 K/CU MM (ref 140–440)
PMV BLD AUTO: 9.7 FL (ref 7.5–11.1)
POTASSIUM SERPL-SCNC: 4.6 MMOL/L (ref 3.5–5.1)
RBC # BLD: 3.74 M/CU MM (ref 4.6–6.2)
SEGMENTED NEUTROPHILS ABSOLUTE COUNT: 4.3 K/CU MM
SEGMENTED NEUTROPHILS RELATIVE PERCENT: 71.7 % (ref 36–66)
SODIUM BLD-SCNC: 136 MMOL/L (ref 135–145)
TOTAL IMMATURE NEUTOROPHIL: 0.04 K/CU MM
TOTAL NUCLEATED RBC: 0 K/CU MM
WBC # BLD: 6.1 K/CU MM (ref 4–10.5)

## 2023-03-27 PROCEDURE — 6370000000 HC RX 637 (ALT 250 FOR IP): Performed by: PHYSICAL MEDICINE & REHABILITATION

## 2023-03-27 PROCEDURE — 97530 THERAPEUTIC ACTIVITIES: CPT

## 2023-03-27 PROCEDURE — 97110 THERAPEUTIC EXERCISES: CPT

## 2023-03-27 PROCEDURE — 80048 BASIC METABOLIC PNL TOTAL CA: CPT

## 2023-03-27 PROCEDURE — 94150 VITAL CAPACITY TEST: CPT

## 2023-03-27 PROCEDURE — 94761 N-INVAS EAR/PLS OXIMETRY MLT: CPT

## 2023-03-27 PROCEDURE — 85025 COMPLETE CBC W/AUTO DIFF WBC: CPT

## 2023-03-27 PROCEDURE — 97535 SELF CARE MNGMENT TRAINING: CPT

## 2023-03-27 PROCEDURE — 36415 COLL VENOUS BLD VENIPUNCTURE: CPT

## 2023-03-27 PROCEDURE — 1280000000 HC REHAB R&B

## 2023-03-27 PROCEDURE — 97116 GAIT TRAINING THERAPY: CPT

## 2023-03-27 PROCEDURE — 6360000002 HC RX W HCPCS: Performed by: PHYSICAL MEDICINE & REHABILITATION

## 2023-03-27 RX ADMIN — ACETAMINOPHEN 650 MG: 325 TABLET ORAL at 20:01

## 2023-03-27 RX ADMIN — LOSARTAN POTASSIUM 50 MG: 25 TABLET, FILM COATED ORAL at 08:32

## 2023-03-27 RX ADMIN — SENNOSIDES 8.6 MG: 8.6 TABLET, COATED ORAL at 20:01

## 2023-03-27 RX ADMIN — METHYLPHENIDATE HYDROCHLORIDE 10 MG: 5 TABLET ORAL at 12:46

## 2023-03-27 RX ADMIN — LEVETIRACETAM 500 MG: 500 TABLET, FILM COATED ORAL at 20:01

## 2023-03-27 RX ADMIN — Medication 6 MG: at 20:01

## 2023-03-27 RX ADMIN — LEVETIRACETAM 500 MG: 500 TABLET, FILM COATED ORAL at 08:33

## 2023-03-27 RX ADMIN — FAMOTIDINE 20 MG: 20 TABLET ORAL at 08:33

## 2023-03-27 RX ADMIN — DOCUSATE SODIUM 100 MG: 100 CAPSULE, LIQUID FILLED ORAL at 08:33

## 2023-03-27 RX ADMIN — SENNOSIDES 8.6 MG: 8.6 TABLET, COATED ORAL at 08:33

## 2023-03-27 RX ADMIN — SODIUM CHLORIDE 1 G: 1 TABLET ORAL at 12:46

## 2023-03-27 RX ADMIN — SODIUM CHLORIDE 1 G: 1 TABLET ORAL at 18:14

## 2023-03-27 RX ADMIN — FLUOXETINE HYDROCHLORIDE 10 MG: 10 CAPSULE ORAL at 08:33

## 2023-03-27 RX ADMIN — DEXAMETHASONE 4 MG: 4 TABLET ORAL at 08:32

## 2023-03-27 RX ADMIN — SODIUM CHLORIDE 1 G: 1 TABLET ORAL at 08:32

## 2023-03-27 RX ADMIN — FAMOTIDINE 20 MG: 20 TABLET ORAL at 20:00

## 2023-03-27 RX ADMIN — METHYLPHENIDATE HYDROCHLORIDE 10 MG: 5 TABLET ORAL at 08:32

## 2023-03-27 ASSESSMENT — PAIN DESCRIPTION - DESCRIPTORS: DESCRIPTORS: ACHING

## 2023-03-27 ASSESSMENT — PAIN DESCRIPTION - ORIENTATION: ORIENTATION: ANTERIOR

## 2023-03-27 ASSESSMENT — PAIN SCALES - GENERAL
PAINLEVEL_OUTOF10: 3
PAINLEVEL_OUTOF10: 0

## 2023-03-27 ASSESSMENT — PAIN DESCRIPTION - PAIN TYPE: TYPE: ACUTE PAIN

## 2023-03-27 ASSESSMENT — PAIN - FUNCTIONAL ASSESSMENT: PAIN_FUNCTIONAL_ASSESSMENT: ACTIVITIES ARE NOT PREVENTED

## 2023-03-27 ASSESSMENT — PAIN DESCRIPTION - LOCATION: LOCATION: HEAD

## 2023-03-27 ASSESSMENT — PAIN DESCRIPTION - FREQUENCY: FREQUENCY: INTERMITTENT

## 2023-03-27 ASSESSMENT — PAIN DESCRIPTION - ONSET: ONSET: GRADUAL

## 2023-03-27 NOTE — PROGRESS NOTES
Comprehensive Nutrition Assessment    Type and Reason for Visit:  Reassess    Nutrition Recommendations/Plan:   Continue regular diet   Will discontinue oral nutrition supplement per request  Will continue to follow up during stay      Malnutrition Assessment:  Malnutrition Status:  No malnutrition (03/27/23 1243)    Context:  Acute Illness       Nutrition Assessment:    Remains on regular diet, meal intake %. Patient reports good appetite, maybe eating too much. Does not drink any oral nutrition supplements, spouse concerned that it will cause constipation. Encouraged to drink adequate fluid during the day. Will discontinue supplements per request. Will continue to follow at low nutrition risk at this time with adequate intake during stay. Nutrition Related Findings:    ? significant avariance in weights during stay,   meds noted: decadron, glucolax, senna, NaCl     last BM 3/23 Wound Type: None       Current Nutrition Intake & Therapies:    Average Meal Intake: %  Average Supplements Intake: 0%  ADULT DIET; Regular  ADULT ORAL NUTRITION SUPPLEMENT; Dinner; Low Calorie/High Protein Oral Supplement    Anthropometric Measures:  Height: 6' (182.9 cm)  Ideal Body Weight (IBW): 178 lbs (81 kg)    Admission Body Weight: 198 lb 13.7 oz (90.2 kg)  Current Body Weight: 245 lb 13 oz (111.5 kg), 120.8 % IBW. Weight Source: Bed Scale  Current BMI (kg/m2): 33.3  Usual Body Weight: 222 lb (100.7 kg) (8/28/22, stated)  % Weight Change (Calculated): -3.2                    BMI Categories: Overweight (BMI 25.0-29. 9)    Estimated Daily Nutrient Needs:  Energy Requirements Based On: Kcal/kg  Weight Used for Energy Requirements: Ideal  Energy (kcal/day): 3659-2092 (22-25 kcal/kg IBW)  Weight Used for Protein Requirements: Ideal  Protein (g/day): 81-97 (1-1.2 g/kg IBW)  Method Used for Fluid Requirements: 1 ml/kcal  Fluid (ml/day): 7147-3990    Nutrition Diagnosis:   No nutrition diagnosis at this time related to   as

## 2023-03-27 NOTE — PROGRESS NOTES
Occupational Therapy    Physical Rehabilitation: OCCUPATIONAL THERAPY     [x] daily progress note       [x] discharge       Patient Name:  Abhishek Parry   :  1945 MRN: [id number]  Room:  [address] [place] [address] Date of Admission: 3/18/2023  Rehabilitation Diagnosis:   Nontraumatic subdural hemorrhage, unspecified [I62.00]  Glioblastoma ([facility] [address] [C71.9]  Subdural hematoma, nontraumatic ([facility] [address] [I62.00]       Date 3/27/2023       Day of ARU Week:  3   Time IN//930  1117/1159  1315/1335   Individual Tx Minutes 87+31+35   Group Tx Minutes    Co-Treat Minutes    Concurrent Tx Minutes    TOTAL Tx Time Mins 120   Variance Time    Variance Time []   Refusal due to:     []   Medical hold/reason:    []   Illness   []   Off Unit for test/procedure  []   Extra time needed to complete task  []   Therapeutic need  []   Other (specify):   Restrictions Restrictions/Precautions: Fall Risk, General Precautions, Seizure (Left side neglect.)         Communication with other providers: [x]   OK to see per nursing:     []   Spoke with team member regarding:      Subjective observations and cognitive status: Pt agreeable to all tx sessions     Pain level/location:    /10       Location: Denied   Discharge recommendations  Anticipated discharge date:  3/28  Destination: []home alone   []home alone w assist prn [] home w/ family    [x] Continuous supervision       []SNF            [] Assisted living     [] Other:   Continued therapy: [x]HHC OT  []OUTPATIENT  OT   [] No Further OT  Equipment needs: Abhishek Parry requires the assistance of a shower chair to successfully and safely complete bathing activities necessary due to reduced balance, endurance, need for ADL assist, and LE weakness and reduced ROM.  These tasks cannot be safely completed without this device and would place Abhishek Parry at greater risk of falls       ADLs:    Eating: Eating  Assistance Needed: Independent  Comment: able to open packages/containers  CARE Score: 6  Discharge
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
## 2023-03-28 VITALS
TEMPERATURE: 97.9 F | DIASTOLIC BLOOD PRESSURE: 85 MMHG | OXYGEN SATURATION: 94 % | WEIGHT: 245.81 LBS | HEIGHT: 72 IN | SYSTOLIC BLOOD PRESSURE: 131 MMHG | RESPIRATION RATE: 18 BRPM | HEART RATE: 62 BPM | BODY MASS INDEX: 33.29 KG/M2

## 2023-03-28 PROCEDURE — 6370000000 HC RX 637 (ALT 250 FOR IP): Performed by: PHYSICAL MEDICINE & REHABILITATION

## 2023-03-28 PROCEDURE — 6360000002 HC RX W HCPCS: Performed by: PHYSICAL MEDICINE & REHABILITATION

## 2023-03-28 PROCEDURE — 94761 N-INVAS EAR/PLS OXIMETRY MLT: CPT

## 2023-03-28 RX ORDER — DEXAMETHASONE 4 MG/1
4 TABLET ORAL DAILY
Qty: 20 TABLET | Refills: 0 | Status: SHIPPED | OUTPATIENT
Start: 2023-03-29 | End: 2023-04-18

## 2023-03-28 RX ADMIN — DOCUSATE SODIUM 100 MG: 100 CAPSULE, LIQUID FILLED ORAL at 09:18

## 2023-03-28 RX ADMIN — LOSARTAN POTASSIUM 50 MG: 25 TABLET, FILM COATED ORAL at 09:17

## 2023-03-28 RX ADMIN — DEXAMETHASONE 4 MG: 4 TABLET ORAL at 09:19

## 2023-03-28 RX ADMIN — SODIUM CHLORIDE 1 G: 1 TABLET ORAL at 09:04

## 2023-03-28 RX ADMIN — METHYLPHENIDATE HYDROCHLORIDE 10 MG: 5 TABLET ORAL at 09:03

## 2023-03-28 RX ADMIN — FLUOXETINE HYDROCHLORIDE 10 MG: 10 CAPSULE ORAL at 09:17

## 2023-03-28 RX ADMIN — SODIUM CHLORIDE 1 G: 1 TABLET ORAL at 12:38

## 2023-03-28 RX ADMIN — LEVETIRACETAM 500 MG: 500 TABLET, FILM COATED ORAL at 09:17

## 2023-03-28 RX ADMIN — FAMOTIDINE 20 MG: 20 TABLET ORAL at 09:19

## 2023-03-28 RX ADMIN — SENNOSIDES 8.6 MG: 8.6 TABLET, COATED ORAL at 09:17
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
## 2023-03-28 NOTE — PROGRESS NOTES
ARU Discharge Assessment    Transportation  \"In the past 6-12 months, has lack of transportation kept you from medical appointments, meetings, work, or from getting things needed for daily living? \"Check all that apply:  [] A.  Yes, it has kept me from medical appointments or from getting my medications  [] B.  Yes, it has kept me from non-medical meetings, appointments, work, or from getting things that I need  [x] C.  No  [] X. Patient unable to respond    Provision of Current Reconciled Medication List to Subsequent Provider at Discharge     [x] No, current reconciled medication list not provided to the subsequent provider.  [] Yes, current reconciled medication list provided to the subsequent provider. YES if D/C to Acute hospital, SNF, home with home health  (**We MUST Select route of transmission below**)      [] Via Electronic Health Record   [] [employer]  [] Verbal (e.g. in person, telephone, video conferencing)  [] Paper-based (e.g. fax, copies, printouts)   [] Other Methods (e.g. texting, email, CDs)    Provision of Current Reconciled Medication List to Patient at Discharge  [] No, current reconciled medication list not provided to the patient, family and/or caregiver. NO If D/C to Acute hospital, SNF, home with home health   [x] Yes, current reconciled medication list provided to the patient, family and/or caregiver. YES if D/C home with Outpatient or no services   (**WE MUST Select route of transmission below**)     [] Via Electronic Health Record (e.g., electronic access to patient portal)   [] [employer]  [] Verbal (e.g. in person, telephone, video conferencing)  [x] Paper-based (e.g. fax, copies, printouts)   [] Other Methods (e.g. texting, email, CDs)    Health Literacy  \"How often do you need to have someone help you when you read instructions, pamphlets, or other written material from your doctor or pharmacy? \"  [x]  0.

## 2023-03-28 NOTE — PROGRESS NOTES
Discharge instruction given with prescription and signed. Patient with belongings propelled by wheelchair to wife's auto. Discharge completed as ordered.

## 2023-03-28 NOTE — PROGRESS NOTES
Needed: Supervision or touching assistance  Comment: CG with B rails  Reason if not Attempted: Not applicable  CARE Score: 4  Discharge Goal: Not Applicable       Wheelchair:  w/c Ability: Wheelchair Ability  Uses a Wheelchair and/or Scooter?: No                Balance:        Object: Picking Up Object  Assistance Needed: Supervision or touching assistance  Comment: SBA with reacher and 2ww, cues for body position/safety  Reason if not Attempted: Not attempted due to medical condition or safety concerns  CARE Score: 4  Discharge Goal: Supervision or touching assistance    I      Exam:    Blood pressure (!) 152/88, pulse 84, temperature 98.4 °F (36.9 °C), temperature source Oral, resp. rate 18, height 6' (1.829 m), weight 245 lb 13 oz (111.5 kg), SpO2 94 %. General: ***    HEENT: ***    Pulmonary: ***    Cardiac: ***    Abdomen: Patient's abdomen is soft and nondistended. Bowel sounds were present throughout. There was no rebound, guarding or masses noted. Upper extremities: ***    Lower extremities: ***    Sitting balance was ***. Standing balance was ***.     Lab Results   Component Value Date    WBC 6.1 03/27/2023    HGB 12.7 (L) 03/27/2023    HCT 37.0 (L) 03/27/2023    MCV 98.9 03/27/2023    PLT 67 (L) 03/27/2023     Lab Results   Component Value Date    INR 0.97 03/15/2023    INR 0.92 08/28/2022    PROTIME 12.6 03/15/2023    PROTIME 11.9 08/28/2022     Lab Results   Component Value Date    CREATININE 0.8 (L) 03/27/2023    BUN 26 (H) 03/27/2023     03/27/2023    K 4.6 03/27/2023     03/27/2023    CO2 22 03/27/2023     Lab Results   Component Value Date    ALT 20 03/15/2023    AST 15 03/15/2023    ALKPHOS 53 03/15/2023    BILITOT 0.5 03/15/2023       Expected length of stay  prior to a supervised level of function for discharge home with a walker and HHC OT/PT is ***    Recommendations:    ***

## 2023-03-28 NOTE — PLAN OF CARE
Problem: Discharge Planning  Goal: Discharge to home or other facility with appropriate resources  3/27/2023 2234 by Ulysses Hylton RN  Outcome: Progressing  3/27/2023 1813 by Delores Aj RN  Outcome: Progressing     Problem: Safety - Adult  Goal: Free from fall injury  3/27/2023 2234 by Ulysses Hylton RN  Outcome: Progressing  3/27/2023 1813 by Delores Aj RN  Outcome: Progressing     Problem: ABCDS Injury Assessment  Goal: Absence of physical injury  3/27/2023 2234 by Ulysses Hylton RN  Outcome: Progressing  Note: Patient is free from falls this shift. Bed in lowest position and call light within reach. Fall education provided to patient. Patient verbalized understanding with teach back. 3/27/2023 1813 by Delores Aj RN  Outcome: Progressing     Problem: Skin/Tissue Integrity  Goal: Absence of new skin breakdown  Description: 1. Monitor for areas of redness and/or skin breakdown  2. Assess vascular access sites hourly  3. Every 4-6 hours minimum:  Change oxygen saturation probe site  4. Every 4-6 hours:  If on nasal continuous positive airway pressure, respiratory therapy assess nares and determine need for appliance change or resting period.   3/27/2023 2234 by Ulysses Hylton RN  Outcome: Progressing  3/27/2023 1813 by Delores Aj RN  Outcome: Progressing     Problem: Pain  Goal: Verbalizes/displays adequate comfort level or baseline comfort level  Outcome: Progressing  Note: Pain is well controlled with ordered medications

## 2023-03-29 ENCOUNTER — TELEPHONE (OUTPATIENT)
Dept: CARDIOLOGY CLINIC | Age: 78
End: 2023-03-29

## 2023-04-18 ENCOUNTER — TELEPHONE (OUTPATIENT)
Dept: GASTROENTEROLOGY | Age: 78
End: 2023-04-18

## 2023-04-28 ENCOUNTER — OFFICE VISIT (OUTPATIENT)
Dept: CARDIOLOGY CLINIC | Age: 78
End: 2023-04-28

## 2023-04-28 VITALS
BODY MASS INDEX: 29.8 KG/M2 | HEIGHT: 72 IN | HEART RATE: 66 BPM | SYSTOLIC BLOOD PRESSURE: 130 MMHG | WEIGHT: 220 LBS | DIASTOLIC BLOOD PRESSURE: 78 MMHG

## 2023-04-28 DIAGNOSIS — Z95.0 PACEMAKER: Primary | ICD-10-CM

## 2023-04-28 DIAGNOSIS — I10 HYPERTENSION, UNSPECIFIED TYPE: ICD-10-CM

## 2023-04-28 ASSESSMENT — ENCOUNTER SYMPTOMS
CHEST TIGHTNESS: 0
DIARRHEA: 0
COUGH: 0
BACK PAIN: 0
ABDOMINAL PAIN: 0
WHEEZING: 0
CONSTIPATION: 0
COLOR CHANGE: 0
SHORTNESS OF BREATH: 0
PHOTOPHOBIA: 0
BLOOD IN STOOL: 0
VOMITING: 0
EYE PAIN: 0
NAUSEA: 0

## 2023-05-04 PROBLEM — I69.054: Status: ACTIVE | Noted: 2023-05-04

## 2023-05-04 PROBLEM — F51.02 ADJUSTMENT INSOMNIA: Status: ACTIVE | Noted: 2023-05-04

## 2023-05-04 PROBLEM — I49.5 SICK SINUS SYNDROME (HCC): Status: ACTIVE | Noted: 2023-05-04

## 2023-05-04 PROBLEM — S06.5XAA SUBDURAL HEMATOMA (HCC): Status: ACTIVE | Noted: 2023-03-18

## 2023-05-10 ENCOUNTER — HOSPITAL ENCOUNTER (OUTPATIENT)
Dept: PHYSICAL THERAPY | Age: 78
Setting detail: THERAPIES SERIES
Discharge: HOME | End: 2023-05-10
Payer: MEDICARE

## 2023-05-10 PROCEDURE — 97112 NEUROMUSCULAR REEDUCATION: CPT

## 2023-05-10 PROCEDURE — 97162 PT EVAL MOD COMPLEX 30 MIN: CPT

## 2023-05-10 NOTE — PROGRESS NOTES
Physical Therapy: Initial Evaluation    Patient: Isabela Michele (19 y.o. male)   Examination Date:   Plan of Care Certification KQLWKS: to        :  1945 ;    Confirmed: Yes MRN: [id number]  CSN: [id number]   Insurance: Payor: MEDICARE / Plan: MEDICARE PART A AND B / Product Type: *No Product type* /   Insurance ID: [id number] - (Medicare) Secondary Insurance (if applicable): [facility]   Referring Physician: MD Dr. Alvin Puentes   PCP: Alvin Fuentes MD Visits to Date/Visits Approved:   /      No Show/Cancelled Appts:   /       Medical Diagnosis: Malignant neoplasm of brain, unspecified [C71.9]  Hemiplegia, unspecified affecting left nondominant side [G81.94] C71.9, G81.94  Treatment Diagnosis: debility, decreased strength, balance     PERTINENT MEDICAL HISTORY           Medical History:      Past Medical History:   Diagnosis Date    Adenomatous polyp of colon 2001    Mildly Dysplastic Tubular Adenoma    Basal cell carcinoma of cheek 2012    left temple; Moh's; [facility]    Basal cell carcinoma of skin of trunk 2010    front of neck; [facility]    Hyperlipidemia     lipids are OK; but [place] 12%    Hypertension 10/2014    Melanoma in situ of scalp ([facility] 2012    Vertex scalp; [place] level III    Mood disorder ([facility] [address]     Irritabilitiy    Ocular histoplasmosis syndrome of both eyes     left eye greater than right eye-S/P laser treatment in [place];  Avastin ; Dr Gail Wei    Sleep disorder     chronic     Surgical History:   Past Surgical History:   Procedure Laterality Date    CARPAL TUNNEL RELEASE Right 2008    COLONOSCOPY      COLONOSCOPY      COLONOSCOPY  2018    polyps 4, call next week for pathology results    COLONOSCOPY N/A 2018    COLONOSCOPY POLYPECTOMY SNARE/COLD BIOPSY performed by Raúl Griffith MD at [address] [facility] [facility] N/A 2021    COLONOSCOPY POLYPECTOMY SNARE/COLD BIOPSY performed by Jeni Rangel

## 2023-05-10 NOTE — FLOWSHEET NOTE
deanna         Any changes in Ambulatory Summary Sheet? None        Objective:  See eval   COVID screening questions were asked and patient attested that there had been no contact or symptoms        Exercises: (No more than 4 columns)   Exercise/Equipment Date 5/10/23 #1 Date Date           WARM UP         Nu step            TABLE      bridge      SLR      Hip abd                     STANDING      Hip abd, ext      Mini squat W UE assist     Heel/toe raise UE assist     Resisted walking                             PROPRIOCEPTION      SLS      Tandem walk // assist     Step tap      Marches                              MODALITIES                      Other Therapeutic Activities/Education:        Home Exercise Program:  heel/toe raises, marches, SLS all with UE balance      Manual Treatments:        Modalities:        Communication with other providers:        Assessment:  (Response towards treatment session) (Pain Rating)  Assessment: Pt presents to PT with complaints of debility/fatigue weakness, and decreased balance. He had onset of L sided weakness following surgery for histoblastoma 8/2022 with new onset of weakness and slurred speach 3/15/23 with trip to ED and hospital admission. D/C from ARU 3/28/23 and recent d/c from home care PT. He tests deficient with functional mobility tasks, balance testing, functional strength. Gait is with short shuffling steps, primarily with a step to pattern using w FWW. Transfers are with heavy use of UE's and difficutly maintaing fwd weight shift. Standing tolerance is limited to less than 2 minutes today.       Plan for Next Session:        Time In / Time Out:  9478 /2810           Timed Code/Total Treatment Minutes:        Next Progress Note due:        Plan of Care Interventions:  [x] Therapeutic Exercise  [] Modalities:  [x] Therapeutic Activity     [] Ultrasound  [] Estim  [x] Gait Training      [] Cervical Traction [] Lumbar Traction  [x] Neuromuscular Re-education

## 2023-05-10 NOTE — PLAN OF CARE
Outpatient Physical Therapy                  [x] Phone: [phone]   Fax: [phone]    Pediatric Therapy                                    [] Phone: [phone]   Fax: [phone]  Pediatric [place]                                      [] Phone: [phone]   Fax: [phone]      To: MD Dr. Jeniffer Villalta   From: Arie Fischer, PT, PT     Patient: Kal Melton       : 1945  Diagnosis: C71.9, G81.94   Treatment Diagnosis: debility, decreased strength, balance   Date: 5/10/2023    Physical Therapy Certification/Re-Certification Form  Dear Dr. Jeniffer Horowitz   The following patient has been evaluated for physical therapy services and for therapy to continue, Please review the attached evaluation and/or summary of the patient's plan of care, and verify that you agree therapy should continue by signing the attached document and sending it back to our office. Patient is a  76 yo  male who presents with debility, decreased strength, endurance, balance which impacts on adls;patient's goal is to improve strength, balance, endurance ;patient reports that  weakness, fatigue, loss of balance  limits activities including transfers, walking, balancing; PT to address patient's goals, impairments and activity limitations with skilled interventions checked in plan of care;patient's level of function prior to onset of  symptoms was independent; did not observe any barriers to learning during PT eval; learning preferences include demonstration, practice, and handouts; patient expressed understanding of HEP; patient appears to be motivated to participate in an active PT program and to be compliant with HEP expectations;patient assisted in developing treatment plan and goals; no DME is currently being used; Pt is in agreement with the treatment plan and goals.   Pt treatment will include multiple approaches to maximize benefit, including demonstration, verbal and tactile cueing, written

## 2023-05-24 ENCOUNTER — HOSPITAL ENCOUNTER (OUTPATIENT)
Dept: CT IMAGING | Age: 78
Discharge: HOME | End: 2023-05-24
Payer: MEDICARE

## 2023-05-24 ENCOUNTER — HOSPITAL ENCOUNTER (OUTPATIENT)
Dept: PHYSICAL THERAPY | Age: 78
Setting detail: THERAPIES SERIES
Discharge: HOME | End: 2023-05-24
Payer: MEDICARE

## 2023-05-24 DIAGNOSIS — C71.9 GLIOBLASTOMA (HCC): ICD-10-CM

## 2023-05-24 PROCEDURE — 70450 CT HEAD/BRAIN W/O DYE: CPT

## 2023-05-24 NOTE — FLOWSHEET NOTE
Physical Therapy  Cancellation/No-show Note  Patient Name:  Jose Antonio De Dios  :  1945   Date:  2023  Cancelled visits to date: 1  No-shows to date: 0    For today's appointment patient:  [x]  Cancelled  []  Rescheduled appointment  []  No-show     Reason given by patient:  [x]  Patient ill  []  Conflicting appointment  []  No transportation    []  Conflict with work  []  No reason given  []  Other:     Comments:  Patient is not feeling well after this week's chemo trx.  He is planning to come to his next appt    Electronically signed by:  GARY Hassan CLT 2023, 2:04 PM

## 2023-05-26 ENCOUNTER — HOSPITAL ENCOUNTER (OUTPATIENT)
Dept: PHYSICAL THERAPY | Age: 78
Setting detail: THERAPIES SERIES
Discharge: HOME | End: 2023-05-26
Payer: MEDICARE

## 2023-05-26 PROCEDURE — 97112 NEUROMUSCULAR REEDUCATION: CPT

## 2023-05-26 PROCEDURE — 97110 THERAPEUTIC EXERCISES: CPT

## 2023-05-26 NOTE — FLOWSHEET NOTE
hospital admission. D/C from ARU 3/28/23 and recent d/c from home care PT. He tests deficient with functional mobility tasks, balance testing, functional strength. Gait is with short shuffling steps, primarily with a step to pattern using w FWW. Transfers are with heavy use of UE's and difficutly maintaing fwd weight shift. Standing tolerance is limited to less than 2 minutes today.        Plan for Next Session: Balance/proprioception, core and LE strengthening       Time In / Time Out:  1028/1106           Timed Code/Total Treatment Minutes:  38'/38'  TE 10' (1), NRE 29' (2)      Next Progress Note due:  10 visits or 30 days      Plan of Care Interventions:  [x] Therapeutic Exercise  [] Modalities:  [x] Therapeutic Activity     [] Ultrasound  [] Estim  [x] Gait Training      [] Cervical Traction [] Lumbar Traction  [x] Neuromuscular Re-education    [] Cold/hotpack [] Iontophoresis   [x] Instruction in HEP      [] Vasopneumatic   [] Dry Needling    [x] Manual Therapy               [] Aquatic Therapy              Electronically signed by:  Partha Charles PT, 5/26/2023, 10:28 AM

## 2023-05-30 ENCOUNTER — HOSPITAL ENCOUNTER (INPATIENT)
Age: 78
DRG: 074 | End: 2023-05-30
Attending: PHYSICAL MEDICINE & REHABILITATION | Admitting: PHYSICAL MEDICINE & REHABILITATION
Payer: MEDICARE

## 2023-05-30 ENCOUNTER — HOSPITAL ENCOUNTER (OUTPATIENT)
Dept: PHYSICAL THERAPY | Age: 78
Discharge: HOME | End: 2023-05-30

## 2023-05-30 DIAGNOSIS — F39 MOOD DISORDER (HCC): Primary | ICD-10-CM

## 2023-05-30 PROBLEM — G62.0 CHEMOTHERAPY-INDUCED NEUROPATHY (HCC): Status: ACTIVE | Noted: 2023-05-30

## 2023-05-30 PROBLEM — T45.1X5A CHEMOTHERAPY-INDUCED NEUROPATHY (HCC): Status: ACTIVE | Noted: 2023-05-30

## 2023-05-30 LAB
ANION GAP SERPL CALCULATED.3IONS-SCNC: 11 MMOL/L (ref 4–16)
BUN SERPL-MCNC: 28 MG/DL (ref 6–23)
CALCIUM SERPL-MCNC: 8.3 MG/DL (ref 8.3–10.6)
CHLORIDE BLD-SCNC: 97 MMOL/L (ref 99–110)
CO2: 23 MMOL/L (ref 21–32)
CREAT SERPL-MCNC: 1 MG/DL (ref 0.9–1.3)
GFR SERPL CREATININE-BSD FRML MDRD: >60 ML/MIN/1.73M2
GLUCOSE SERPL-MCNC: 109 MG/DL (ref 70–99)
HCT VFR BLD CALC: 36.1 % (ref 42–52)
HEMOGLOBIN: 12.7 GM/DL (ref 13.5–18)
MCH RBC QN AUTO: 34.9 PG (ref 27–31)
MCHC RBC AUTO-ENTMCNC: 35.2 % (ref 32–36)
MCV RBC AUTO: 99.2 FL (ref 78–100)
PDW BLD-RTO: 12.5 % (ref 11.7–14.9)
PLATELET # BLD: 143 K/CU MM (ref 140–440)
PMV BLD AUTO: 9.8 FL (ref 7.5–11.1)
POTASSIUM SERPL-SCNC: 4.4 MMOL/L (ref 3.5–5.1)
RBC # BLD: 3.64 M/CU MM (ref 4.6–6.2)
SODIUM BLD-SCNC: 131 MMOL/L (ref 135–145)
WBC # BLD: 7.6 K/CU MM (ref 4–10.5)

## 2023-05-30 PROCEDURE — 36415 COLL VENOUS BLD VENIPUNCTURE: CPT

## 2023-05-30 PROCEDURE — 80048 BASIC METABOLIC PNL TOTAL CA: CPT

## 2023-05-30 PROCEDURE — 6370000000 HC RX 637 (ALT 250 FOR IP): Performed by: PHYSICAL MEDICINE & REHABILITATION

## 2023-05-30 PROCEDURE — 1280000000 HC REHAB R&B

## 2023-05-30 PROCEDURE — 85027 COMPLETE CBC AUTOMATED: CPT

## 2023-05-30 PROCEDURE — 94761 N-INVAS EAR/PLS OXIMETRY MLT: CPT

## 2023-05-30 PROCEDURE — 99223 1ST HOSP IP/OBS HIGH 75: CPT | Performed by: PHYSICAL MEDICINE & REHABILITATION

## 2023-05-30 RX ORDER — ONDANSETRON 4 MG/1
4 TABLET, ORALLY DISINTEGRATING ORAL 4 TIMES DAILY PRN
Status: DISCONTINUED | OUTPATIENT
Start: 2023-05-30 | End: 2023-06-07 | Stop reason: HOSPADM

## 2023-05-30 RX ORDER — FAMOTIDINE 20 MG/1
40 TABLET, FILM COATED ORAL NIGHTLY
Status: DISCONTINUED | OUTPATIENT
Start: 2023-05-30 | End: 2023-06-07 | Stop reason: HOSPADM

## 2023-05-30 RX ORDER — IPRATROPIUM BROMIDE 21 UG/1
1 SPRAY, METERED NASAL 3 TIMES DAILY
Status: DISCONTINUED | OUTPATIENT
Start: 2023-05-30 | End: 2023-05-30 | Stop reason: CLARIF

## 2023-05-30 RX ORDER — METHYLPHENIDATE HYDROCHLORIDE 5 MG/1
5 TABLET ORAL 2 TIMES DAILY WITH MEALS
Status: DISCONTINUED | OUTPATIENT
Start: 2023-05-30 | End: 2023-06-07 | Stop reason: HOSPADM

## 2023-05-30 RX ORDER — DEXAMETHASONE 4 MG/1
6 TABLET ORAL EVERY 12 HOURS SCHEDULED
Status: DISCONTINUED | OUTPATIENT
Start: 2023-05-31 | End: 2023-05-31

## 2023-05-30 RX ORDER — LEVETIRACETAM 500 MG/1
500 TABLET ORAL 2 TIMES DAILY
Status: DISCONTINUED | OUTPATIENT
Start: 2023-05-30 | End: 2023-06-07 | Stop reason: HOSPADM

## 2023-05-30 RX ORDER — LANOLIN ALCOHOL/MO/W.PET/CERES
3 CREAM (GRAM) TOPICAL NIGHTLY PRN
Status: DISCONTINUED | OUTPATIENT
Start: 2023-05-30 | End: 2023-05-30

## 2023-05-30 RX ORDER — CHOLECALCIFEROL (VITAMIN D3) 125 MCG
10 CAPSULE ORAL NIGHTLY PRN
Status: DISCONTINUED | OUTPATIENT
Start: 2023-05-30 | End: 2023-06-07 | Stop reason: HOSPADM

## 2023-05-30 RX ORDER — POLYETHYLENE GLYCOL 3350 17 G/17G
17 POWDER, FOR SOLUTION ORAL DAILY PRN
Status: DISCONTINUED | OUTPATIENT
Start: 2023-05-30 | End: 2023-06-07 | Stop reason: HOSPADM

## 2023-05-30 RX ORDER — SODIUM CHLORIDE 1 G/1
1 TABLET ORAL 2 TIMES DAILY WITH MEALS
Status: DISCONTINUED | OUTPATIENT
Start: 2023-05-30 | End: 2023-06-07 | Stop reason: HOSPADM

## 2023-05-30 RX ORDER — DOCUSATE SODIUM 100 MG/1
100 CAPSULE, LIQUID FILLED ORAL 2 TIMES DAILY
Status: DISCONTINUED | OUTPATIENT
Start: 2023-05-30 | End: 2023-06-07 | Stop reason: HOSPADM

## 2023-05-30 RX ORDER — IPRATROPIUM BROMIDE 42 UG/1
1 SPRAY, METERED NASAL 3 TIMES DAILY
Status: DISCONTINUED | OUTPATIENT
Start: 2023-05-30 | End: 2023-06-07 | Stop reason: HOSPADM

## 2023-05-30 RX ORDER — TAMSULOSIN HYDROCHLORIDE 0.4 MG/1
0.4 CAPSULE ORAL DAILY
Status: DISCONTINUED | OUTPATIENT
Start: 2023-05-31 | End: 2023-06-07 | Stop reason: HOSPADM

## 2023-05-30 RX ORDER — VIBEGRON 75 MG/1
TABLET, FILM COATED ORAL DAILY
COMMUNITY

## 2023-05-30 RX ORDER — FLUOXETINE HYDROCHLORIDE 20 MG/1
40 CAPSULE ORAL DAILY
Status: DISCONTINUED | OUTPATIENT
Start: 2023-05-31 | End: 2023-06-07 | Stop reason: HOSPADM

## 2023-05-30 RX ORDER — BISACODYL 10 MG
10 SUPPOSITORY, RECTAL RECTAL DAILY PRN
Status: DISCONTINUED | OUTPATIENT
Start: 2023-05-30 | End: 2023-06-07 | Stop reason: HOSPADM

## 2023-05-30 RX ORDER — ENOXAPARIN SODIUM 100 MG/ML
40 INJECTION SUBCUTANEOUS DAILY
Status: DISCONTINUED | OUTPATIENT
Start: 2023-05-31 | End: 2023-06-07

## 2023-05-30 RX ORDER — LOSARTAN POTASSIUM 25 MG/1
50 TABLET ORAL DAILY
Status: DISCONTINUED | OUTPATIENT
Start: 2023-05-31 | End: 2023-06-01

## 2023-05-30 RX ORDER — ACETAMINOPHEN 325 MG/1
650 TABLET ORAL EVERY 4 HOURS PRN
Status: DISCONTINUED | OUTPATIENT
Start: 2023-05-30 | End: 2023-06-07 | Stop reason: HOSPADM

## 2023-05-30 RX ADMIN — DOCUSATE SODIUM 100 MG: 100 CAPSULE, LIQUID FILLED ORAL at 21:47

## 2023-05-30 RX ADMIN — FAMOTIDINE 40 MG: 20 TABLET ORAL at 21:47

## 2023-05-30 RX ADMIN — Medication 10 MG: at 21:48

## 2023-05-30 RX ADMIN — ACETAMINOPHEN 650 MG: 325 TABLET ORAL at 21:48

## 2023-05-30 RX ADMIN — LEVETIRACETAM 500 MG: 500 TABLET, FILM COATED ORAL at 21:47

## 2023-05-30 ASSESSMENT — PAIN SCALES - GENERAL: PAINLEVEL_OUTOF10: 0

## 2023-05-30 ASSESSMENT — PAIN SCALES - WONG BAKER: WONGBAKER_NUMERICALRESPONSE: 0

## 2023-05-30 NOTE — FLOWSHEET NOTE
Physical Therapy  Cancellation/No-show Note  Patient Name:  Shant Guzman  :  1945   Date:  2023  Cancelled visits to date: 1  No-shows to date: 1    For today's appointment patient:  []  Cancelled  []  Rescheduled appointment  [x]  No-show     Reason given by patient:  [x]  Patient ill  []  Conflicting appointment  []  No transportation    []  Conflict with work  []  No reason given  []  Other:     Comments:       Electronically signed by:  Kieran Gonzalez PTA,   2023, 2:37 PM

## 2023-05-30 NOTE — H&P
Loli Westbrook    : 1945  Acct #: [de-identified]  MRN: [id number]              History and physical  Date of face-to-face exam: 2023. Time of face-to-face exam: 1640. Admitting diagnosis: Chemotherapy-induced neuropathy ( [address])    Comorbid diagnoses impacting rehabilitation: Generalized weakness, left hemiparesis, gait disturbance, history of glioblastoma with resection on 2022, subdural hematoma in 2023, ocular histoplasmosis, essential hypertension, depression with anxiety, mixed hyperlipidemia, chronic constipation    Chief complaint: Difficulty standing and walking. History of present illness: The patient is a 69-year-old right-hand-dominant male known to the rehab unit at [address] following inpatient stays for recovery from his glioblastoma resection in 2022 and a subdural hematoma in 2023. In both instances, he was discharged only with significant family assistance and adaptive equipment requirements. Over the last 6 weeks he has been receiving chemotherapy as an outpatient for his glioblastoma. He describes chronic but worsening generalized weakness superimposed upon his chronic left hemiparesis. He has had stumbles with falls and recently has been unable to assist his family enough for him to stay at home. He was seen by his primary care physician due to his progressive weakness for further evaluation. Brain imaging did not reveal any acute stroke, new bleeding or tumor progression. He has had generalized weakness without evidence of new infection in the lungs or bladder. He has been diagnosed with a chemotherapy-induced peripheral neuropathy and oncology documents that he has completed his last round of the planned chemotherapy. At this point he cannot do his own toileting, transfers and self-care and cannot return directly home. He requires inpatient rehabilitation to address these issues. Review of systems: Chronic blurred vision with his histoplasmosis.

## 2023-05-31 PROBLEM — G81.94 HEMIPARESIS OF LEFT NONDOMINANT SIDE DUE TO NON-CEREBROVASCULAR ETIOLOGY (HCC): Status: ACTIVE | Noted: 2023-05-04

## 2023-05-31 PROBLEM — I60.9 SUBARACHNOID HEMORRHAGE (HCC): Status: ACTIVE | Noted: 2023-05-31

## 2023-05-31 PROCEDURE — 97163 PT EVAL HIGH COMPLEX 45 MIN: CPT

## 2023-05-31 PROCEDURE — 97530 THERAPEUTIC ACTIVITIES: CPT

## 2023-05-31 PROCEDURE — 6360000002 HC RX W HCPCS: Performed by: PHYSICAL MEDICINE & REHABILITATION

## 2023-05-31 PROCEDURE — 97535 SELF CARE MNGMENT TRAINING: CPT

## 2023-05-31 PROCEDURE — 1280000000 HC REHAB R&B

## 2023-05-31 PROCEDURE — 94761 N-INVAS EAR/PLS OXIMETRY MLT: CPT

## 2023-05-31 PROCEDURE — 97116 GAIT TRAINING THERAPY: CPT

## 2023-05-31 PROCEDURE — 99232 SBSQ HOSP IP/OBS MODERATE 35: CPT | Performed by: PHYSICAL MEDICINE & REHABILITATION

## 2023-05-31 PROCEDURE — 6370000000 HC RX 637 (ALT 250 FOR IP): Performed by: PHYSICAL MEDICINE & REHABILITATION

## 2023-05-31 PROCEDURE — 97167 OT EVAL HIGH COMPLEX 60 MIN: CPT

## 2023-05-31 RX ORDER — CALCIUM CARBONATE 500 MG/1
500 TABLET, CHEWABLE ORAL 4 TIMES DAILY PRN
Status: DISCONTINUED | OUTPATIENT
Start: 2023-05-31 | End: 2023-06-07 | Stop reason: HOSPADM

## 2023-05-31 RX ORDER — DEXAMETHASONE 4 MG/1
6 TABLET ORAL DAILY
Status: DISCONTINUED | OUTPATIENT
Start: 2023-06-01 | End: 2023-06-07

## 2023-05-31 RX ADMIN — TAMSULOSIN HYDROCHLORIDE 0.4 MG: 0.4 CAPSULE ORAL at 08:41

## 2023-05-31 RX ADMIN — DOCUSATE SODIUM 100 MG: 100 CAPSULE, LIQUID FILLED ORAL at 08:41

## 2023-05-31 RX ADMIN — DEXAMETHASONE 6 MG: 4 TABLET ORAL at 08:40

## 2023-05-31 RX ADMIN — SODIUM CHLORIDE 1 G: 1 TABLET ORAL at 08:40

## 2023-05-31 RX ADMIN — Medication 10 MG: at 21:01

## 2023-05-31 RX ADMIN — FAMOTIDINE 40 MG: 20 TABLET ORAL at 21:01

## 2023-05-31 RX ADMIN — DOCUSATE SODIUM 100 MG: 100 CAPSULE, LIQUID FILLED ORAL at 21:01

## 2023-05-31 RX ADMIN — ENOXAPARIN SODIUM 40 MG: 100 INJECTION SUBCUTANEOUS at 12:33

## 2023-05-31 RX ADMIN — METHYLPHENIDATE HYDROCHLORIDE 5 MG: 5 TABLET ORAL at 08:41

## 2023-05-31 RX ADMIN — SODIUM CHLORIDE 1 G: 1 TABLET ORAL at 17:44

## 2023-05-31 RX ADMIN — LEVETIRACETAM 500 MG: 500 TABLET, FILM COATED ORAL at 08:40

## 2023-05-31 RX ADMIN — IPRATROPIUM BROMIDE 1 SPRAY: 42 SPRAY, METERED NASAL at 21:01

## 2023-05-31 RX ADMIN — FLUOXETINE 40 MG: 20 CAPSULE ORAL at 08:41

## 2023-05-31 RX ADMIN — ACETAMINOPHEN 650 MG: 325 TABLET ORAL at 21:03

## 2023-05-31 RX ADMIN — LEVETIRACETAM 500 MG: 500 TABLET, FILM COATED ORAL at 21:01

## 2023-05-31 RX ADMIN — METHYLPHENIDATE HYDROCHLORIDE 5 MG: 5 TABLET ORAL at 17:43

## 2023-05-31 ASSESSMENT — PAIN SCALES - WONG BAKER: WONGBAKER_NUMERICALRESPONSE: 0

## 2023-05-31 ASSESSMENT — PAIN DESCRIPTION - LOCATION: LOCATION: GENERALIZED

## 2023-05-31 ASSESSMENT — PAIN SCALES - GENERAL
PAINLEVEL_OUTOF10: 0
PAINLEVEL_OUTOF10: 0
PAINLEVEL_OUTOF10: 1

## 2023-05-31 ASSESSMENT — PAIN DESCRIPTION - DESCRIPTORS: DESCRIPTORS: DISCOMFORT

## 2023-05-31 NOTE — PLAN OF CARE
Problem: Discharge Planning  Goal: Discharge to home or other facility with appropriate resources  Outcome: Progressing  Flowsheets (Taken 5/30/2023 1728 by Dot Wills RN)  Discharge to home or other facility with appropriate resources: Identify discharge learning needs (meds, wound care, etc)     Problem: Safety - Adult  Goal: Free from fall injury  Outcome: Progressing     Problem: Skin/Tissue Integrity  Goal: Absence of new skin breakdown  Description: 1. Monitor for areas of redness and/or skin breakdown  2. Assess vascular access sites hourly  3. Every 4-6 hours minimum:  Change oxygen saturation probe site  4. Every 4-6 hours:  If on nasal continuous positive airway pressure, respiratory therapy assess nares and determine need for appliance change or resting period.   Outcome: Progressing

## 2023-05-31 NOTE — CARE COORDINATION
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]  Chemotherapy-induced neuropathy ([address] [G62.0, T45.1X5A]    Chief Complaint:   Past Medical History:   Diagnosis Date    Adenomatous polyp of colon 2001    Mildly Dysplastic Tubular Adenoma    Basal cell carcinoma of cheek 2012    left temple; Moh's; [place] [facility]    Basal cell carcinoma of skin of trunk 2010    front of neck; [facility]    Hyperlipidemia     lipids are OK; but [place] 12%    Hypertension 10/2014    Melanoma in situ of scalp ([facility] [address] 2012    Vertex scalp; [place] level III    Mood disorder ([facility] [address]     Irritabilitiy    Ocular histoplasmosis syndrome of both eyes     left eye greater than right eye-S/P laser treatment in [place];  Avastin ; Dr Juan J Jansen    Sleep disorder     chronic     Past Surgical History:   Procedure Laterality Date    CARPAL TUNNEL RELEASE Right 2008    COLONOSCOPY      COLONOSCOPY      COLONOSCOPY  2018    polyps 4, call next week for pathology results    COLONOSCOPY N/A 2018    COLONOSCOPY POLYPECTOMY SNARE/COLD BIOPSY performed by Hiram Gill MD at [address] [facility] N/A 2021    COLONOSCOPY POLYPECTOMY SNARE/COLD BIOPSY performed by Hiram Gill MD at [address] [place] [address] ARTHROSCOPY Right 2015    Dr Bernardino Harper      L4-L5    MOHS SURGERY  2012    left temple: [facility]; [place] [facility]    SKIN CANCER EXCISION  2012    Melanoma; scalp     No Known Allergies  Precautions: falls    Date of Admit: 2023  Room #: 1027/1027-A    Current functional status at time of admit:  Home Living/DME Available:  Type of Home: House  Home Access: Stairs to enter without rails  Bathroom Shower/Tub: Walk-in shower, Shower chair without back  Bathroom Toilet: Handicap height  Bathroom Equipment: Grab bars in shower, Grab bars around

## 2023-05-31 NOTE — PLAN OF CARE
ARU Interdisciplinary Plan of Care (IPOC)  [facility]  1st [facility]  Kathrin Moreau, [address]  [phone]  Fax: [phone]    Jeremias Ellsworth    : 1945  Acct #: [de-identified]  MRN: [id number]   PHYSICIAN:  Kaila Rosario MD  Primary Active Problems:   Active Hospital Problems    Diagnosis Date Noted    Subarachnoid hemorrhage ([facility] [address] [I60.9] 2023    Chemotherapy-induced peripheral neuropathy ([facility] [address] [G62.0, T45.1X5A] 2023    Hemiparesis of left nondominant side due to non-cerebrovascular etiology [facility]) [G81.94] 2023     Rehabilitation Diagnosis:     Drug-induced polyneuropathy [G62.0]  Chemotherapy-induced neuropathy ([facility] [address] [G62.0, T45.1X5A]    CARE PLAN     NURSING:  eJremias Ellsworth while on this unit will:      Bowel and Bladder   [x] Be continent of bowel and bladder      [] Have an adequate number of bowel movements   [] Urinate with no urinary retention >300ml in bladder   [] Bladder Scan: (details)   [] Complete bladder protocol with forman removal   [] Initiate Bladder Program to toilet every ___ hours   [] Initiate Bowel Program to toilet every ___hours   [] Bladder training    [] Bowel training  Pulmonary   [x] Maintain O2 SATs at 92% or greater  Pain Management   [x] Have pain managed while on ARU        [] Be pain free by discharge    [x] Medication Management and Education  Maintenance of Skin Integrity/Wound Management   [x] Have no skin breakdown while on ARU   [] Have improved skin integrity via wound measurements   [] Have no signs/symptoms of infection via infection protection and monitoring at the          wound site  Fall Prevention   [x] Be free from injury during hospitalization via fall prevention measures     [x] Disease management and Education  Precautions   [] Weight Bearing Precautions   [] Swallowing Precautions   [] Monitoring of Risks of Complications   [x] DVT Prophylaxis    [x] Fluid/electrolyte/Nutrition

## 2023-05-31 NOTE — PATIENT CARE CONFERENCE
ACUTE REHAB TEAM CONFERENCE SUMMARY   [address]    NAME: Jelena Wood  : 1945 ADMIT DATE: 2023    Rehab Admitting Dx:Drug-induced polyneuropathy [G62.0]  Chemotherapy-induced neuropathy ([facility] [address] [G62.0, T45.1X5A]  Patient Comorbid Conditions: Active Hospital Problems    Diagnosis Date Noted    Subarachnoid hemorrhage ([facility] [address] [I60.9] 2023    Chemotherapy-induced peripheral neuropathy ([facility] [address] [G62.0, T45.1X5A] 2023    Hemiparesis of left nondominant side due to non-cerebrovascular etiology [facility]) [G81.94] 2023     Date: 2023    CASE MANAGEMENT  Current issues/needs regarding patient and family discharge status: Lives with spouse, 1-level, 1 DOUGIE, Home DME: 2WW, SC, Hospital Bed  Patient and family goal: Home with Outpatient services. PHYSICAL THERAPY (Updated in QI)  Short Term Goals  Time Frame for Short Term Goals: 7 days STG=LTG  Short Term Goal 1: pt will perform bed mobility with MOD I  Short Term Goal 2: Pt ting perform sit <>stand, bed<>w/c and car transfers with supervision  Short Term Goal 3: Pt will ambulate 150' on level and 10' on unlevel surface with 2ww and supervision with improved gait deviations  Short Term Goal 4: Pt will ascend/descend curb step with 2ww and 12 steps with b rails with supervision  Short Term Goal 5: Pt will retrieve object from floor with 2ww and supervision          Impairments/deficits, barriers:     Body Structures, Functions, Activity Limitations Requiring Skilled Therapeutic Intervention: Decreased functional mobility , Decreased strength, Decreased safe awareness, Decreased cognition, Decreased balance, Decreased endurance, Decreased vision/visual deficit, Increased pain, Decreased high-level IADLs, Decreased fine motor control, Decreased coordination     Therapy Prognosis: Good  Decision Making: High Complexity  Clinical Presentation: unpredicatable characteristics  Equipment needed at discharge:possible w/c      PT

## 2023-06-01 ENCOUNTER — HOSPITAL ENCOUNTER (OUTPATIENT)
Dept: PHYSICAL THERAPY | Age: 78
Discharge: HOME | End: 2023-06-01

## 2023-06-01 PROCEDURE — 6370000000 HC RX 637 (ALT 250 FOR IP): Performed by: PHYSICAL MEDICINE & REHABILITATION

## 2023-06-01 PROCEDURE — 97110 THERAPEUTIC EXERCISES: CPT

## 2023-06-01 PROCEDURE — 97116 GAIT TRAINING THERAPY: CPT

## 2023-06-01 PROCEDURE — 94664 DEMO&/EVAL PT USE INHALER: CPT

## 2023-06-01 PROCEDURE — 1280000000 HC REHAB R&B

## 2023-06-01 PROCEDURE — 94150 VITAL CAPACITY TEST: CPT

## 2023-06-01 PROCEDURE — 97530 THERAPEUTIC ACTIVITIES: CPT

## 2023-06-01 PROCEDURE — 6360000002 HC RX W HCPCS: Performed by: PHYSICAL MEDICINE & REHABILITATION

## 2023-06-01 PROCEDURE — 94761 N-INVAS EAR/PLS OXIMETRY MLT: CPT

## 2023-06-01 PROCEDURE — 92523 SPEECH SOUND LANG COMPREHEN: CPT

## 2023-06-01 RX ORDER — LOSARTAN POTASSIUM 25 MG/1
25 TABLET ORAL DAILY
Status: DISCONTINUED | OUTPATIENT
Start: 2023-06-01 | End: 2023-06-07 | Stop reason: HOSPADM

## 2023-06-01 RX ADMIN — FAMOTIDINE 40 MG: 20 TABLET ORAL at 20:30

## 2023-06-01 RX ADMIN — DOCUSATE SODIUM 100 MG: 100 CAPSULE, LIQUID FILLED ORAL at 09:40

## 2023-06-01 RX ADMIN — LOSARTAN POTASSIUM 25 MG: 25 TABLET, FILM COATED ORAL at 12:16

## 2023-06-01 RX ADMIN — Medication 10 MG: at 20:30

## 2023-06-01 RX ADMIN — ENOXAPARIN SODIUM 40 MG: 100 INJECTION SUBCUTANEOUS at 09:40

## 2023-06-01 RX ADMIN — METHYLPHENIDATE HYDROCHLORIDE 5 MG: 5 TABLET ORAL at 12:16

## 2023-06-01 RX ADMIN — SODIUM CHLORIDE 1 G: 1 TABLET ORAL at 09:46

## 2023-06-01 RX ADMIN — FLUOXETINE 40 MG: 20 CAPSULE ORAL at 09:40

## 2023-06-01 RX ADMIN — DOCUSATE SODIUM 100 MG: 100 CAPSULE, LIQUID FILLED ORAL at 20:30

## 2023-06-01 RX ADMIN — IPRATROPIUM BROMIDE 1 SPRAY: 42 SPRAY, METERED NASAL at 09:40

## 2023-06-01 RX ADMIN — LEVETIRACETAM 500 MG: 500 TABLET, FILM COATED ORAL at 20:30

## 2023-06-01 RX ADMIN — CALCIUM CARBONATE 500 MG: 500 TABLET, CHEWABLE ORAL at 18:04

## 2023-06-01 RX ADMIN — ACETAMINOPHEN 650 MG: 325 TABLET ORAL at 20:30

## 2023-06-01 RX ADMIN — METHYLPHENIDATE HYDROCHLORIDE 5 MG: 5 TABLET ORAL at 09:46

## 2023-06-01 RX ADMIN — IPRATROPIUM BROMIDE 1 SPRAY: 42 SPRAY, METERED NASAL at 20:35

## 2023-06-01 RX ADMIN — TAMSULOSIN HYDROCHLORIDE 0.4 MG: 0.4 CAPSULE ORAL at 09:40

## 2023-06-01 RX ADMIN — SODIUM CHLORIDE 1 G: 1 TABLET ORAL at 18:02

## 2023-06-01 RX ADMIN — LEVETIRACETAM 500 MG: 500 TABLET, FILM COATED ORAL at 09:40

## 2023-06-01 RX ADMIN — DEXAMETHASONE 6 MG: 4 TABLET ORAL at 09:40

## 2023-06-01 ASSESSMENT — PAIN SCALES - GENERAL
PAINLEVEL_OUTOF10: 0
PAINLEVEL_OUTOF10: 1
PAINLEVEL_OUTOF10: 0

## 2023-06-01 ASSESSMENT — PAIN DESCRIPTION - LOCATION: LOCATION: GENERALIZED

## 2023-06-01 ASSESSMENT — PAIN DESCRIPTION - DESCRIPTORS: DESCRIPTORS: DISCOMFORT

## 2023-06-01 NOTE — CARE COORDINATION
LSW met with patient and provided written communication following Care Conference. Spoke with spouse and daughter by phone. LSW informed patient of recommendations for possible WC, Outpatient PT, OT. Provided patient with list of Outpatient facilities in geographic area. Patient verbalized understanding and chose [address]. Referral will be made closer to discharge. Whiteboard updated. D/C Plan:  Estimated Date: June 7  DME: possible WC (Agency TBD)  Outpatient: PT, OT ([address])  To:  Home with spouse (family will transport)

## 2023-06-01 NOTE — FLOWSHEET NOTE
Physical Therapy  Cancellation/No-show Note  Patient Name:  Kal Melton  :  1945   Date:  2023  Cancelled visits to date: 2  No-shows to date: 1    For today's appointment patient:  [x]  Cancelled  []  Rescheduled appointment  []  No-show     Reason given by patient:  []  Patient ill  []  Conflicting appointment  []  No transportation    []  Conflict with work  []  No reason given  [x]  Other:  pt in hospital on ARU. Spoke with daughter, she requested we cancel next 2 appts, leave him on for [facility] for reassessment with understanding that she needs script to cont since dad was hospitalized. Relates that it took 2 weeks to get him in last time and he lost a lot of strength during that time and does not want that to happen again. She will call if not out of hospital by that time.    Comments:       Electronically signed by:  Meenu Park PTA, [id number]  2023, 11:13 AM

## 2023-06-02 PROCEDURE — 97535 SELF CARE MNGMENT TRAINING: CPT

## 2023-06-02 PROCEDURE — 6360000002 HC RX W HCPCS: Performed by: PHYSICAL MEDICINE & REHABILITATION

## 2023-06-02 PROCEDURE — 97116 GAIT TRAINING THERAPY: CPT

## 2023-06-02 PROCEDURE — 6370000000 HC RX 637 (ALT 250 FOR IP): Performed by: PHYSICAL MEDICINE & REHABILITATION

## 2023-06-02 PROCEDURE — 97112 NEUROMUSCULAR REEDUCATION: CPT

## 2023-06-02 PROCEDURE — 97110 THERAPEUTIC EXERCISES: CPT

## 2023-06-02 PROCEDURE — 94150 VITAL CAPACITY TEST: CPT

## 2023-06-02 PROCEDURE — 1280000000 HC REHAB R&B

## 2023-06-02 PROCEDURE — 94761 N-INVAS EAR/PLS OXIMETRY MLT: CPT

## 2023-06-02 PROCEDURE — 97530 THERAPEUTIC ACTIVITIES: CPT

## 2023-06-02 RX ADMIN — DOCUSATE SODIUM 100 MG: 100 CAPSULE, LIQUID FILLED ORAL at 10:19

## 2023-06-02 RX ADMIN — FLUOXETINE 40 MG: 20 CAPSULE ORAL at 10:19

## 2023-06-02 RX ADMIN — Medication 10 MG: at 20:12

## 2023-06-02 RX ADMIN — DEXAMETHASONE 6 MG: 4 TABLET ORAL at 10:19

## 2023-06-02 RX ADMIN — LOSARTAN POTASSIUM 25 MG: 25 TABLET, FILM COATED ORAL at 10:19

## 2023-06-02 RX ADMIN — SODIUM CHLORIDE 1 G: 1 TABLET ORAL at 20:12

## 2023-06-02 RX ADMIN — TAMSULOSIN HYDROCHLORIDE 0.4 MG: 0.4 CAPSULE ORAL at 10:20

## 2023-06-02 RX ADMIN — METHYLPHENIDATE HYDROCHLORIDE 5 MG: 5 TABLET ORAL at 10:19

## 2023-06-02 RX ADMIN — ACETAMINOPHEN 650 MG: 325 TABLET ORAL at 20:12

## 2023-06-02 RX ADMIN — SODIUM CHLORIDE 1 G: 1 TABLET ORAL at 15:54

## 2023-06-02 RX ADMIN — ENOXAPARIN SODIUM 40 MG: 100 INJECTION SUBCUTANEOUS at 10:20

## 2023-06-02 RX ADMIN — DOCUSATE SODIUM 100 MG: 100 CAPSULE, LIQUID FILLED ORAL at 20:13

## 2023-06-02 RX ADMIN — LEVETIRACETAM 500 MG: 500 TABLET, FILM COATED ORAL at 10:19

## 2023-06-02 RX ADMIN — FAMOTIDINE 40 MG: 20 TABLET ORAL at 20:12

## 2023-06-02 RX ADMIN — LEVETIRACETAM 500 MG: 500 TABLET, FILM COATED ORAL at 20:12

## 2023-06-02 RX ADMIN — SODIUM CHLORIDE 1 G: 1 TABLET ORAL at 10:20

## 2023-06-02 ASSESSMENT — PAIN DESCRIPTION - ORIENTATION: ORIENTATION: RIGHT;LEFT

## 2023-06-02 ASSESSMENT — PAIN SCALES - GENERAL: PAINLEVEL_OUTOF10: 3

## 2023-06-02 ASSESSMENT — PAIN DESCRIPTION - LOCATION: LOCATION: HEAD

## 2023-06-02 ASSESSMENT — PAIN DESCRIPTION - DESCRIPTORS: DESCRIPTORS: ACHING;DULL

## 2023-06-02 ASSESSMENT — PAIN - FUNCTIONAL ASSESSMENT: PAIN_FUNCTIONAL_ASSESSMENT: ACTIVITIES ARE NOT PREVENTED

## 2023-06-03 PROCEDURE — 6370000000 HC RX 637 (ALT 250 FOR IP): Performed by: PHYSICAL MEDICINE & REHABILITATION

## 2023-06-03 PROCEDURE — 97110 THERAPEUTIC EXERCISES: CPT

## 2023-06-03 PROCEDURE — 97530 THERAPEUTIC ACTIVITIES: CPT

## 2023-06-03 PROCEDURE — 94150 VITAL CAPACITY TEST: CPT

## 2023-06-03 PROCEDURE — 94664 DEMO&/EVAL PT USE INHALER: CPT

## 2023-06-03 PROCEDURE — 97116 GAIT TRAINING THERAPY: CPT

## 2023-06-03 PROCEDURE — 94761 N-INVAS EAR/PLS OXIMETRY MLT: CPT

## 2023-06-03 PROCEDURE — 1280000000 HC REHAB R&B

## 2023-06-03 PROCEDURE — 97535 SELF CARE MNGMENT TRAINING: CPT

## 2023-06-03 PROCEDURE — 6360000002 HC RX W HCPCS: Performed by: PHYSICAL MEDICINE & REHABILITATION

## 2023-06-03 RX ADMIN — LEVETIRACETAM 500 MG: 500 TABLET, FILM COATED ORAL at 10:32

## 2023-06-03 RX ADMIN — METHYLPHENIDATE HYDROCHLORIDE 5 MG: 5 TABLET ORAL at 09:30

## 2023-06-03 RX ADMIN — TAMSULOSIN HYDROCHLORIDE 0.4 MG: 0.4 CAPSULE ORAL at 10:31

## 2023-06-03 RX ADMIN — METHYLPHENIDATE HYDROCHLORIDE 5 MG: 5 TABLET ORAL at 12:52

## 2023-06-03 RX ADMIN — LOSARTAN POTASSIUM 25 MG: 25 TABLET, FILM COATED ORAL at 10:31

## 2023-06-03 RX ADMIN — FAMOTIDINE 40 MG: 20 TABLET ORAL at 20:45

## 2023-06-03 RX ADMIN — ACETAMINOPHEN 650 MG: 325 TABLET ORAL at 06:09

## 2023-06-03 RX ADMIN — Medication 10 MG: at 20:44

## 2023-06-03 RX ADMIN — DEXAMETHASONE 6 MG: 4 TABLET ORAL at 10:31

## 2023-06-03 RX ADMIN — DOCUSATE SODIUM 100 MG: 100 CAPSULE, LIQUID FILLED ORAL at 20:45

## 2023-06-03 RX ADMIN — ACETAMINOPHEN 650 MG: 325 TABLET ORAL at 20:45

## 2023-06-03 RX ADMIN — SODIUM CHLORIDE 1 G: 1 TABLET ORAL at 10:31

## 2023-06-03 RX ADMIN — ENOXAPARIN SODIUM 40 MG: 100 INJECTION SUBCUTANEOUS at 10:32

## 2023-06-03 RX ADMIN — SODIUM CHLORIDE 1 G: 1 TABLET ORAL at 17:30

## 2023-06-03 RX ADMIN — FLUOXETINE 40 MG: 20 CAPSULE ORAL at 10:32

## 2023-06-03 RX ADMIN — DOCUSATE SODIUM 100 MG: 100 CAPSULE, LIQUID FILLED ORAL at 10:32

## 2023-06-03 RX ADMIN — LEVETIRACETAM 500 MG: 500 TABLET, FILM COATED ORAL at 20:44

## 2023-06-03 ASSESSMENT — PAIN DESCRIPTION - PAIN TYPE: TYPE: ACUTE PAIN

## 2023-06-03 ASSESSMENT — PAIN DESCRIPTION - DESCRIPTORS: DESCRIPTORS: ACHING

## 2023-06-03 ASSESSMENT — PAIN DESCRIPTION - LOCATION: LOCATION: GENERALIZED

## 2023-06-03 ASSESSMENT — PAIN SCALES - GENERAL
PAINLEVEL_OUTOF10: 0
PAINLEVEL_OUTOF10: 2
PAINLEVEL_OUTOF10: 0

## 2023-06-03 ASSESSMENT — PAIN DESCRIPTION - ORIENTATION: ORIENTATION: RIGHT;LEFT

## 2023-06-03 ASSESSMENT — PAIN DESCRIPTION - ONSET: ONSET: ON-GOING

## 2023-06-03 ASSESSMENT — PAIN DESCRIPTION - FREQUENCY: FREQUENCY: CONTINUOUS

## 2023-06-04 VITALS
OXYGEN SATURATION: 96 % | DIASTOLIC BLOOD PRESSURE: 84 MMHG | HEIGHT: 72 IN | BODY MASS INDEX: 29.44 KG/M2 | RESPIRATION RATE: 16 BRPM | HEART RATE: 61 BPM | SYSTOLIC BLOOD PRESSURE: 131 MMHG | TEMPERATURE: 98.1 F | WEIGHT: 217.37 LBS

## 2023-06-04 PROCEDURE — 94150 VITAL CAPACITY TEST: CPT

## 2023-06-04 PROCEDURE — 6370000000 HC RX 637 (ALT 250 FOR IP): Performed by: PHYSICAL MEDICINE & REHABILITATION

## 2023-06-04 PROCEDURE — 1280000000 HC REHAB R&B

## 2023-06-04 PROCEDURE — 6360000002 HC RX W HCPCS: Performed by: PHYSICAL MEDICINE & REHABILITATION

## 2023-06-04 PROCEDURE — 94761 N-INVAS EAR/PLS OXIMETRY MLT: CPT

## 2023-06-04 PROCEDURE — 94664 DEMO&/EVAL PT USE INHALER: CPT

## 2023-06-04 RX ADMIN — DEXAMETHASONE 6 MG: 4 TABLET ORAL at 07:30

## 2023-06-04 RX ADMIN — SODIUM CHLORIDE 1 G: 1 TABLET ORAL at 07:30

## 2023-06-04 RX ADMIN — SODIUM CHLORIDE 1 G: 1 TABLET ORAL at 17:22

## 2023-06-04 RX ADMIN — Medication 10 MG: at 20:41

## 2023-06-04 RX ADMIN — FAMOTIDINE 40 MG: 20 TABLET ORAL at 20:41

## 2023-06-04 RX ADMIN — METHYLPHENIDATE HYDROCHLORIDE 5 MG: 5 TABLET ORAL at 13:34

## 2023-06-04 RX ADMIN — TAMSULOSIN HYDROCHLORIDE 0.4 MG: 0.4 CAPSULE ORAL at 07:30

## 2023-06-04 RX ADMIN — ENOXAPARIN SODIUM 40 MG: 100 INJECTION SUBCUTANEOUS at 07:30

## 2023-06-04 RX ADMIN — DOCUSATE SODIUM 100 MG: 100 CAPSULE, LIQUID FILLED ORAL at 20:41

## 2023-06-04 RX ADMIN — FLUOXETINE 40 MG: 20 CAPSULE ORAL at 07:30

## 2023-06-04 RX ADMIN — LEVETIRACETAM 500 MG: 500 TABLET, FILM COATED ORAL at 07:30

## 2023-06-04 RX ADMIN — ACETAMINOPHEN 650 MG: 325 TABLET ORAL at 21:31

## 2023-06-04 RX ADMIN — METHYLPHENIDATE HYDROCHLORIDE 5 MG: 5 TABLET ORAL at 07:30

## 2023-06-04 RX ADMIN — DOCUSATE SODIUM 100 MG: 100 CAPSULE, LIQUID FILLED ORAL at 07:30

## 2023-06-04 RX ADMIN — LEVETIRACETAM 500 MG: 500 TABLET, FILM COATED ORAL at 20:41

## 2023-06-04 RX ADMIN — LOSARTAN POTASSIUM 25 MG: 25 TABLET, FILM COATED ORAL at 07:30

## 2023-06-04 ASSESSMENT — PAIN DESCRIPTION - LOCATION: LOCATION: GENERALIZED

## 2023-06-04 ASSESSMENT — PAIN SCALES - WONG BAKER
WONGBAKER_NUMERICALRESPONSE: 0

## 2023-06-04 ASSESSMENT — PAIN SCALES - GENERAL
PAINLEVEL_OUTOF10: 0

## 2023-06-04 ASSESSMENT — PAIN DESCRIPTION - DESCRIPTORS: DESCRIPTORS: ACHING

## 2023-06-04 ASSESSMENT — PAIN DESCRIPTION - ONSET: ONSET: ON-GOING

## 2023-06-04 ASSESSMENT — PAIN - FUNCTIONAL ASSESSMENT: PAIN_FUNCTIONAL_ASSESSMENT: ACTIVITIES ARE NOT PREVENTED

## 2023-06-04 ASSESSMENT — PAIN DESCRIPTION - ORIENTATION: ORIENTATION: RIGHT;LEFT

## 2023-06-04 ASSESSMENT — PAIN DESCRIPTION - FREQUENCY: FREQUENCY: CONTINUOUS

## 2023-06-05 PROCEDURE — 6370000000 HC RX 637 (ALT 250 FOR IP): Performed by: PHYSICAL MEDICINE & REHABILITATION

## 2023-06-05 PROCEDURE — 97530 THERAPEUTIC ACTIVITIES: CPT

## 2023-06-05 PROCEDURE — 97110 THERAPEUTIC EXERCISES: CPT

## 2023-06-05 PROCEDURE — 94761 N-INVAS EAR/PLS OXIMETRY MLT: CPT

## 2023-06-05 PROCEDURE — 94150 VITAL CAPACITY TEST: CPT

## 2023-06-05 PROCEDURE — 1280000000 HC REHAB R&B

## 2023-06-05 PROCEDURE — 97535 SELF CARE MNGMENT TRAINING: CPT

## 2023-06-05 PROCEDURE — 97116 GAIT TRAINING THERAPY: CPT

## 2023-06-05 PROCEDURE — 6360000002 HC RX W HCPCS: Performed by: PHYSICAL MEDICINE & REHABILITATION

## 2023-06-05 RX ADMIN — METHYLPHENIDATE HYDROCHLORIDE 5 MG: 5 TABLET ORAL at 08:20

## 2023-06-05 RX ADMIN — SODIUM CHLORIDE 1 G: 1 TABLET ORAL at 08:21

## 2023-06-05 RX ADMIN — DOCUSATE SODIUM 100 MG: 100 CAPSULE, LIQUID FILLED ORAL at 08:20

## 2023-06-05 RX ADMIN — LEVETIRACETAM 500 MG: 500 TABLET, FILM COATED ORAL at 21:14

## 2023-06-05 RX ADMIN — SODIUM CHLORIDE 1 G: 1 TABLET ORAL at 17:59

## 2023-06-05 RX ADMIN — ACETAMINOPHEN 650 MG: 325 TABLET ORAL at 21:14

## 2023-06-05 RX ADMIN — LEVETIRACETAM 500 MG: 500 TABLET, FILM COATED ORAL at 08:20

## 2023-06-05 RX ADMIN — TAMSULOSIN HYDROCHLORIDE 0.4 MG: 0.4 CAPSULE ORAL at 08:20

## 2023-06-05 RX ADMIN — IPRATROPIUM BROMIDE 1 SPRAY: 42 SPRAY, METERED NASAL at 10:00

## 2023-06-05 RX ADMIN — FLUOXETINE 40 MG: 20 CAPSULE ORAL at 08:20

## 2023-06-05 RX ADMIN — LOSARTAN POTASSIUM 25 MG: 25 TABLET, FILM COATED ORAL at 08:20

## 2023-06-05 RX ADMIN — ENOXAPARIN SODIUM 40 MG: 100 INJECTION SUBCUTANEOUS at 08:21

## 2023-06-05 RX ADMIN — DOCUSATE SODIUM 100 MG: 100 CAPSULE, LIQUID FILLED ORAL at 21:14

## 2023-06-05 RX ADMIN — DEXAMETHASONE 6 MG: 4 TABLET ORAL at 08:21

## 2023-06-05 RX ADMIN — FAMOTIDINE 40 MG: 20 TABLET ORAL at 21:13

## 2023-06-05 RX ADMIN — IPRATROPIUM BROMIDE 1 SPRAY: 42 SPRAY, METERED NASAL at 14:36

## 2023-06-05 RX ADMIN — Medication 10 MG: at 21:13

## 2023-06-05 RX ADMIN — METHYLPHENIDATE HYDROCHLORIDE 5 MG: 5 TABLET ORAL at 12:15

## 2023-06-05 ASSESSMENT — PAIN SCALES - WONG BAKER: WONGBAKER_NUMERICALRESPONSE: 0

## 2023-06-05 ASSESSMENT — PAIN SCALES - GENERAL
PAINLEVEL_OUTOF10: 0
PAINLEVEL_OUTOF10: 0

## 2023-06-06 PROCEDURE — 97535 SELF CARE MNGMENT TRAINING: CPT

## 2023-06-06 PROCEDURE — 97110 THERAPEUTIC EXERCISES: CPT

## 2023-06-06 PROCEDURE — 6370000000 HC RX 637 (ALT 250 FOR IP): Performed by: PHYSICAL MEDICINE & REHABILITATION

## 2023-06-06 PROCEDURE — 1280000000 HC REHAB R&B

## 2023-06-06 PROCEDURE — 6360000002 HC RX W HCPCS: Performed by: PHYSICAL MEDICINE & REHABILITATION

## 2023-06-06 PROCEDURE — 97112 NEUROMUSCULAR REEDUCATION: CPT

## 2023-06-06 PROCEDURE — 94761 N-INVAS EAR/PLS OXIMETRY MLT: CPT

## 2023-06-06 PROCEDURE — 97530 THERAPEUTIC ACTIVITIES: CPT

## 2023-06-06 PROCEDURE — 97116 GAIT TRAINING THERAPY: CPT

## 2023-06-06 RX ORDER — SENNA PLUS 8.6 MG/1
2 TABLET ORAL ONCE
Status: COMPLETED | OUTPATIENT
Start: 2023-06-06 | End: 2023-06-06

## 2023-06-06 RX ORDER — SENNA PLUS 8.6 MG/1
2 TABLET ORAL NIGHTLY PRN
Status: DISCONTINUED | OUTPATIENT
Start: 2023-06-06 | End: 2023-06-07 | Stop reason: HOSPADM

## 2023-06-06 RX ADMIN — METHYLPHENIDATE HYDROCHLORIDE 5 MG: 5 TABLET ORAL at 10:24

## 2023-06-06 RX ADMIN — SENNOSIDES 17.2 MG: 8.6 TABLET, FILM COATED ORAL at 17:44

## 2023-06-06 RX ADMIN — DOCUSATE SODIUM 100 MG: 100 CAPSULE, LIQUID FILLED ORAL at 22:03

## 2023-06-06 RX ADMIN — SODIUM CHLORIDE 1 G: 1 TABLET ORAL at 16:41

## 2023-06-06 RX ADMIN — LEVETIRACETAM 500 MG: 500 TABLET, FILM COATED ORAL at 22:03

## 2023-06-06 RX ADMIN — DOCUSATE SODIUM 100 MG: 100 CAPSULE, LIQUID FILLED ORAL at 10:20

## 2023-06-06 RX ADMIN — LEVETIRACETAM 500 MG: 500 TABLET, FILM COATED ORAL at 10:21

## 2023-06-06 RX ADMIN — IPRATROPIUM BROMIDE 1 SPRAY: 42 SPRAY, METERED NASAL at 16:36

## 2023-06-06 RX ADMIN — SODIUM CHLORIDE 1 G: 1 TABLET ORAL at 10:20

## 2023-06-06 RX ADMIN — FLUOXETINE 40 MG: 20 CAPSULE ORAL at 10:21

## 2023-06-06 RX ADMIN — POLYETHYLENE GLYCOL 3350 17 G: 17 POWDER, FOR SOLUTION ORAL at 16:38

## 2023-06-06 RX ADMIN — LOSARTAN POTASSIUM 25 MG: 25 TABLET, FILM COATED ORAL at 10:21

## 2023-06-06 RX ADMIN — ACETAMINOPHEN 650 MG: 325 TABLET ORAL at 22:05

## 2023-06-06 RX ADMIN — ENOXAPARIN SODIUM 40 MG: 100 INJECTION SUBCUTANEOUS at 10:21

## 2023-06-06 RX ADMIN — FAMOTIDINE 40 MG: 20 TABLET ORAL at 22:03

## 2023-06-06 RX ADMIN — DEXAMETHASONE 6 MG: 4 TABLET ORAL at 10:20

## 2023-06-06 RX ADMIN — Medication 10 MG: at 22:03

## 2023-06-06 RX ADMIN — TAMSULOSIN HYDROCHLORIDE 0.4 MG: 0.4 CAPSULE ORAL at 10:20

## 2023-06-06 ASSESSMENT — PAIN SCALES - GENERAL: PAINLEVEL_OUTOF10: 0

## 2023-06-06 NOTE — CARE COORDINATION
Discussed discharge with patient and spouse and provided a copy of the Important Message from Medicare form signed upon admission. Patient verbalized understanding. Spouse stated that she purchased a transport chair.

## 2023-06-07 VITALS
DIASTOLIC BLOOD PRESSURE: 81 MMHG | BODY MASS INDEX: 33.2 KG/M2 | WEIGHT: 245.15 LBS | OXYGEN SATURATION: 96 % | HEART RATE: 62 BPM | TEMPERATURE: 97.7 F | HEIGHT: 72 IN | SYSTOLIC BLOOD PRESSURE: 128 MMHG | RESPIRATION RATE: 19 BRPM

## 2023-06-07 LAB
ANION GAP SERPL CALCULATED.3IONS-SCNC: 10 MMOL/L (ref 4–16)
BUN SERPL-MCNC: 24 MG/DL (ref 6–23)
CALCIUM SERPL-MCNC: 8.1 MG/DL (ref 8.3–10.6)
CHLORIDE BLD-SCNC: 97 MMOL/L (ref 99–110)
CO2: 24 MMOL/L (ref 21–32)
CREAT SERPL-MCNC: 1 MG/DL (ref 0.9–1.3)
GFR SERPL CREATININE-BSD FRML MDRD: >60 ML/MIN/1.73M2
GLUCOSE SERPL-MCNC: 144 MG/DL (ref 70–99)
HCT VFR BLD CALC: 39.1 % (ref 42–52)
HEMOGLOBIN: 13.2 GM/DL (ref 13.5–18)
MCH RBC QN AUTO: 34.8 PG (ref 27–31)
MCHC RBC AUTO-ENTMCNC: 33.8 % (ref 32–36)
MCV RBC AUTO: 103.2 FL (ref 78–100)
PDW BLD-RTO: 12.3 % (ref 11.7–14.9)
PLATELET # BLD: 109 K/CU MM (ref 140–440)
PMV BLD AUTO: 9.5 FL (ref 7.5–11.1)
POTASSIUM SERPL-SCNC: 4.2 MMOL/L (ref 3.5–5.1)
RBC # BLD: 3.79 M/CU MM (ref 4.6–6.2)
SODIUM BLD-SCNC: 131 MMOL/L (ref 135–145)
WBC # BLD: 5.9 K/CU MM (ref 4–10.5)

## 2023-06-07 PROCEDURE — 6370000000 HC RX 637 (ALT 250 FOR IP): Performed by: PHYSICAL MEDICINE & REHABILITATION

## 2023-06-07 PROCEDURE — 80048 BASIC METABOLIC PNL TOTAL CA: CPT

## 2023-06-07 PROCEDURE — 36415 COLL VENOUS BLD VENIPUNCTURE: CPT

## 2023-06-07 PROCEDURE — 6360000002 HC RX W HCPCS: Performed by: PHYSICAL MEDICINE & REHABILITATION

## 2023-06-07 PROCEDURE — 85027 COMPLETE CBC AUTOMATED: CPT

## 2023-06-07 RX ORDER — DEXAMETHASONE 2 MG/1
TABLET ORAL
Qty: 35 TABLET | Refills: 0 | Status: SHIPPED | OUTPATIENT
Start: 2023-06-07

## 2023-06-07 RX ORDER — LOSARTAN POTASSIUM 25 MG/1
25 TABLET ORAL DAILY
Qty: 30 TABLET | Refills: 0 | Status: SHIPPED | OUTPATIENT
Start: 2023-06-08

## 2023-06-07 RX ORDER — DEXAMETHASONE 4 MG/1
4 TABLET ORAL DAILY
Status: DISCONTINUED | OUTPATIENT
Start: 2023-06-08 | End: 2023-06-07 | Stop reason: HOSPADM

## 2023-06-07 RX ORDER — SODIUM CHLORIDE 1 G/1
1 TABLET ORAL 2 TIMES DAILY WITH MEALS
Qty: 60 TABLET | Refills: 0 | Status: SHIPPED | OUTPATIENT
Start: 2023-06-07

## 2023-06-07 RX ORDER — TAMSULOSIN HYDROCHLORIDE 0.4 MG/1
0.4 CAPSULE ORAL DAILY
Qty: 30 CAPSULE | Refills: 0 | Status: SHIPPED | OUTPATIENT
Start: 2023-06-08

## 2023-06-07 RX ORDER — IPRATROPIUM BROMIDE 42 UG/1
1 SPRAY, METERED NASAL 3 TIMES DAILY
Qty: 30 ML | Refills: 0 | Status: SHIPPED | OUTPATIENT
Start: 2023-06-07

## 2023-06-07 RX ORDER — METHYLPHENIDATE HYDROCHLORIDE 5 MG/1
5 TABLET ORAL 2 TIMES DAILY WITH MEALS
Qty: 60 TABLET | Refills: 0 | Status: SHIPPED | OUTPATIENT
Start: 2023-06-07 | End: 2023-07-07

## 2023-06-07 RX ADMIN — FLUOXETINE 40 MG: 20 CAPSULE ORAL at 07:48

## 2023-06-07 RX ADMIN — DOCUSATE SODIUM 100 MG: 100 CAPSULE, LIQUID FILLED ORAL at 07:48

## 2023-06-07 RX ADMIN — LOSARTAN POTASSIUM 25 MG: 25 TABLET, FILM COATED ORAL at 07:48

## 2023-06-07 RX ADMIN — METHYLPHENIDATE HYDROCHLORIDE 5 MG: 5 TABLET ORAL at 07:48

## 2023-06-07 RX ADMIN — ENOXAPARIN SODIUM 40 MG: 100 INJECTION SUBCUTANEOUS at 07:47

## 2023-06-07 RX ADMIN — LEVETIRACETAM 500 MG: 500 TABLET, FILM COATED ORAL at 07:48

## 2023-06-07 RX ADMIN — DEXAMETHASONE 6 MG: 4 TABLET ORAL at 07:48

## 2023-06-07 RX ADMIN — TAMSULOSIN HYDROCHLORIDE 0.4 MG: 0.4 CAPSULE ORAL at 07:48

## 2023-06-07 RX ADMIN — SODIUM CHLORIDE 1 G: 1 TABLET ORAL at 07:47

## 2023-06-07 NOTE — DISCHARGE INSTRUCTIONS
Your information:  Name: Ami Jiménez  : 1945    Your instructions:    Pt is discharging to home with [address]  [phone]  A representative from [facility] will contact you at home to schedule your appointment    What to do after you leave the hospital:    Recommended diet: {diet:18596}    Recommended activity: {discharge activity:69879}        The following personal items were collected during your admission and were returned to you:    Belongings  Dental Appliances: None  Vision - Corrective Lenses: Eyeglasses  Hearing Aid: None  Clothing: Footwear, Shirt, Undergarments, Pants  Jewelry: Watch  Body Piercings Removed: N/A  Electronic Devices: Cell Phone,   Weapons (Notify Protective Services/Security): None  Other Valuables: Walker  Home Medications: None  Valuables Given To: Patient  Provide Name(s) of Who Valuable(s) Were Given To: n/a  Responsible person(s) in the waiting room: n/a  Patient approves for provider to speak to responsible person post operatively: No    Information obtained by:  By signing below, I understand that if any problems occur once I leave the hospital I am to contact ***. I understand and acknowledge receipt of the instructions indicated above.

## 2023-06-07 NOTE — CARE COORDINATION
ARU  Discharge Summary    D/C Date: 06/07/2023    Patient discharged to: Home    Transported by: Family    Referrals made to: [facility]    Additional information: Follow up appointments scheduled with Girma Benito MD Tuesday Jun 13, 2023 3:30 PM, NABEEL Mckeon NP Wednesday Jun 14, 2023 10:00 AM, Dr. Giovanna Jordan Wednesday Jul 19, 2023 10:00 AM. Patient notified, AVS updated    Caregiver training: none requested    Discharge BIMS completed:  [x]

## 2023-06-07 NOTE — DISCHARGE INSTR - ACTIVITY
Pt is discharging to home with [address] [facility]  A representative from [facility] will contact you at home to schedule your appointment

## 2023-06-07 NOTE — PROGRESS NOTES
06/06/23 1021   Encounter Summary   Encounter Overview/Reason  Spiritual/Emotional Needs   Service Provided For: Patient   [facility]   Support System Spouse; Children   Last Encounter  06/06/23  (Offered prayer and spiritual support 2nd visit was busy with first. Will continue to support as needed.  patient states that he will be going home tomorrow.)   Complexity of Encounter Low   Begin Time 1011   End Time  1023   Total Time Calculated 12 min   Encounter    Type Initial Screen/Assessment   Spiritual/Emotional needs   Type Spiritual Support   Assessment/Intervention/Outcome   Assessment Hopeful;Peaceful   Intervention Active listening;Discussed belief system/Mandaen practices/jose;Nurtured Hope;Prayer (assurance of)/[place]onal[place];Sustaining Presence/Ministry of presence   Outcome Encouraged;Engaged in conversation;Expressed Gratitude   Plan and Referrals   Plan/Referrals No future visits requested
4 Eyes Skin Assessment     NAME:  Mayank Frederick  YOB: 1945  MEDICAL RECORD NUMBER:  [id number]    The patient is being assessed for  Admission    I agree that at least one RN has performed a thorough Head to Toe Skin Assessment on the patient. ALL assessment sites listed below have been assessed. Areas assessed by both nurses:    Head, Face, Ears, Shoulders, Back, Chest, Arms, Elbows, Hands, Sacrum. Buttock, Coccyx, Ischium, and Legs. Feet and Heels        Does the Patient have a Wound?  No noted wound(s)       Nakul Prevention initiated by RN: Yes  Wound Care Orders initiated by RN: No    Pressure Injury (Stage 3,4, Unstageable, DTI, NWPT, and Complex wounds) if present, place Wound referral order by RN under : No    New Ostomies, if present place, Ostomy referral order under : No     Nurse 1 eSignature: Electronically signed by Jose Antonio Harrington RN on 5/30/23 at 6:07 PM EDT    **SHARE this note so that the co-signing nurse can place an eSignature**    Nurse 2 eSignature: Electronically signed by Wendie Doshi RN on 2/04/06 at 6:10 PM EDT
ARU Admission Assessment - [facility]      A Complete drug regimen review was completed for this patient this date. [x]  No clinically significant medication issue was identified   []  Yes, a clinically significant medication issue was identified     []  Adverse Drug Event:    []  Allergy:    []  Side Effect:    []  Ineffective Therapy:    []  Drug interaction:     []  Duplicate Therapy:    []  Untreated Indication:    []  Non-adherence:    []  Other:  Nursing contacted the physician:       Date:                Time:    Actions recommended by physician were completed:   Date:                 Time:  Action(s) Taken:             []  New Physician Order Received    []  Issue Noted by Physician; However No Action Required    []  Other:        Ethnicity  \"Are you of , /a, or Thai origin? \"  Check all that apply:  [x] A. No, not of , /a, or [place] Origin  [] B.  Yes, [place], [place] American, Chicano/a  [] C.  Yes, [address] [place] [address]  [] D.  Yes, [place]  [] E.  Yes, another , , or Thai origin  [] X. Patient unable to respond    Race  \"What is your race? \"  Check all that apply:  [x] A. White  [] B. Black or   [] C. American [facility] or [place] Native  [] D.  [facility]  [] E. [place]  [] F. Scottish  [] G. [place]  [] Wilber Sides  [] I. [place]  [] J.  Other   [] K.   [] L. Sudanese or Radha  [] M. Tanzanian  [] N. Other [facility]  [] X. Patient unable to respond    Language  A. \"What is your preferred language? \"   English    B. \"Do you need or want an  to communicate with a doctor or health care staff? \"  Check only one:  [x] 0. No  [] 1. Yes  [] 9. Unable to determine    Transportation  \"In the past 6-12 months, has lack of transportation kept you from medical appointments, meetings, work, or from getting things needed for daily living? \"  Check all that apply:  [] A.  Yes, it has kept me from
Comprehensive Nutrition Assessment    Type and Reason for Visit:  Initial, Consult (oral nutrition supplement)    Nutrition Recommendations/Plan:   Continue regular diet at this time, snacks as desires  Will continue to follow up during stay      Malnutrition Assessment:  Malnutrition Status:  No malnutrition (05/31/23 1020)    Context:  Chronic Illness       Nutrition Assessment:    Admit to acute rehab unit with chemo-induced neuropathy. Prior admit to rehab in March this year for weakness. Able to tolerate regular diet with good appetite at this time. Recent meal intake %. No oral nutrition supplement wanted at this time. Reports recent weight gain no hx of loss. Will follow at low nutrition risk at this time. Nutrition Related Findings:    resting in bed after PT, tired on visit, hx skin cancer  meds noted: decadron, pepcid, NaCl Wound Type: None       Current Nutrition Intake & Therapies:    Average Meal Intake: 51-75%, %  Average Supplements Intake: Refusing to take  ADULT DIET; Regular    Anthropometric Measures:  Height: 6' (182.9 cm)  Ideal Body Weight (IBW): 178 lbs (81 kg)       Current Body Weight: 222 lb 7.1 oz (100.9 kg), 125 % IBW. Weight Source: Bed Scale  Current BMI (kg/m2): 30.2  Usual Body Weight: 214 lb (97.1 kg) (per hx March 2023)  % Weight Change (Calculated): 3.9  Weight Adjustment For: No Adjustment                 BMI Categories: Obese Class 1 (BMI 30.0-34. 9)    Estimated Daily Nutrient Needs:  Energy Requirements Based On: Formula  Weight Used for Energy Requirements: Current  Energy (kcal/day): 2982-9855 (m[address] st jeor)     Protein (g/day): 81-97 (1-1.2 g/kg)  Method Used for Fluid Requirements: 1 ml/kcal  Fluid (ml/day): 2100    Nutrition Diagnosis:   No nutrition diagnosis at this time related to   as evidenced by      Nutrition Interventions:   Food and/or Nutrient Delivery: Continue Current Diet  Nutrition Education/Counseling: No recommendation at this
Facility/Department: [facility] ARU  Initial Speech/Language/Cognitive Assessment    NAME: Shant Guzman  : 1945   MRN: [id number]  ADMISSION DATE: 2023  ADMITTING DIAGNOSIS: has Mood disorder ([address] Adenomatous polyp of colon; Ocular histoplasmosis; Essential hypertension; Medication management; Hyperlipidemia; Sleep disorder; Benign neoplasm of descending colon; Benign neoplasm of transverse colon; Glioblastoma ([address] Subdural hematoma ([address] Generalized weakness; Gait disturbance; Depression with anxiety; Chronic constipation; Hemiparesis of left nondominant side due to non-cerebrovascular etiology ([address] Sick sinus syndrome ([address] Adjustment insomnia; Chemotherapy-induced peripheral neuropathy ([address] and Subarachnoid hemorrhage ([address] on their problem list.  DATE ONSET: 23    Date of Eval: 2023   Evaluating Therapist: CHANCE Carson    RECENT RESULTS  CT OF HEAD/MRI:  FINDINGS:  BRAIN/VENTRICLES: Again seen are postsurgical changes from right sided  craniotomy. Similar ex vacuo dilatation of the right temporal horn with  otherwise stable caliber and configuration of the ventricles. Again seen is  extensive, asymmetric right cerebral hemispheric subcortical and  periventricular low attenuation, similar to prior. Interval decrease in the  trace residual low attenuating subdural collection overlying the right  frontal parietal convexity. No significant mass effect or midline shift. ORBITS: The visualized portion of the orbits demonstrate no acute abnormality. SINUSES: The visualized paranasal sinuses and mastoid air cells demonstrate  no acute abnormality. SOFT TISSUES/SKULL:  No acute abnormality of the visualized skull or soft  tissues. IMPRESSION:  Interval decrease in the trace residual low attenuating subdural collection  overlying the right frontal parietal convexity. Additional above findings, similar to prior.     Primary Complaint: No complaints
Mindi Mckee    : 1945  Acct #: [de-identified]  MRN: [id number]              PM&R Progress Note      Admitting diagnosis: ***    Comorbid diagnoses impacting rehabilitation: ***    Chief complaint: ***    Prior (baseline) level of function: Independent. Current level of function:         Current  IRF-[facility] and Goals:   Occupational Therapy:    Short Term Goals  Time Frame for Short Term Goals: STGs=LTGs :   Long Term Goals  Time Frame for Long Term Goals : ~10 days or until d/c  Long Term Goal 1: Pt will complete grooming tasks Ind  Long Term Goal 2: Pt will complete total body bathing c SBA  Long Term Goal 3: Pt will complete UB dressing c setup  Long Term Goal 4: Pt will complete LB dressing c SBA  Long Term Goal 5: Pt will doff/don footwear c setup  Additional Goals?: Yes  Long Term Goal 6: Pt will complete toileting c SBA  Long Term Goal 7: Pt will complete functional transfers (bed, chair, toilet, shower) c DME PRN and SBA  Long Term Goal 8: Pt will perform therex/therax to facilitate increased strength/endurance/ax tolerance (c emphasis on dynamic standing balance/tolerance > 5 mins, LUE neuro re-ed, visual perceptual retraining, cognitive retraining) c SBA :                                       Eating: Eating  Assistance Needed: Setup or clean-up assistance  Comment: Pt reports needing assistance opening orange juice. CARE Score: 5  Discharge Goal: Independent       Oral Hygiene: Oral Hygiene  Assistance Needed: Supervision or touching assistance  Comment: VC for problem solving and increase time required to complete task/use zip lock bag  CARE Score: 4  Discharge Goal: Independent    UB/LB Bathing: Shower/Bathe Self  Assistance Needed: Supervision or touching assistance  Comment: Pt completed full shower seated on shower bench for majority. Pt requires verbal cues for initiation, continuation and for thoroughness.  30 secs into being in the shower, Pt shut the water off saying he was done requiring
Morris Hill    : 1945  Acct #: [de-identified]  MRN: [id number]              PM&R Progress Note      Admitting diagnosis: Chemotherapy-induced neuropathy ( [address])     Comorbid diagnoses impacting rehabilitation: Generalized weakness, left hemiparesis, gait disturbance, history of glioblastoma with resection on 2022, subdural hematoma in 2023, ocular histoplasmosis, essential hypertension, depression with anxiety, mixed hyperlipidemia, chronic constipation     Chief complaint: Slept fair. Looking forward to therapy. Generally weak. Prior (baseline) level of function: Independent. Current level of function:         Current  IRF-[facility] and Goals:   Occupational Therapy:      :     :                                       Eating:         Oral Hygiene:      UB/LB Bathing:      UB Dressing:           LB Dressing:      [place] Footwear: Toileting: Toilet Transfers: Toilet Transfer  Assistance needed: Setup or clean-up assistance  CARE Score: 5    Physical Therapy:                Bed Mobility:              Transfers:                  Ambulation:                                                      Wheelchair:  w/c Ability:                  Balance:        Object:      I      Exam:    Blood pressure 112/64, pulse 70, temperature 98.4 °F (36.9 °C), temperature source Tympanic, resp. rate 20, height 6' (1.829 m), weight 222 lb 7.1 oz (100.9 kg), SpO2 95 %. General: Semirecumbent in bed. Passive in conversation but able to answer direct questions. Alert. HEENT: Left facial droop with dysarthria. No word finding difficulties. Neck supple. MMM. Pulmonary: Unlabored with shallow respirations. Cardiac: RRR. Abdomen: Patient's abdomen is soft and nondistended. Bowel sounds were present throughout. There was no rebound, guarding or masses noted. Upper extremities: He struggles to reposition his left arm while lying in bed. No reflexes.   Blunted
Occupational Therapy                              [facility] ARU OCCUPATIONAL THERAPY EVALUATION    Chart Review:  Past Medical History:   Diagnosis Date    Adenomatous polyp of colon 1/2001    Mildly Dysplastic Tubular Adenoma    Basal cell carcinoma of cheek 12/2012    left temple; Moh's; [facility]    Basal cell carcinoma of skin of trunk 11/2010    front of neck; [facility]    Hyperlipidemia 2015    lipids are OK; but [place] 12%    Hypertension 10/2014    Melanoma in situ of scalp ([address] 12/2012    Vertex scalp; Van level III    Mood disorder ([facility]     Irritabilitiy    Ocular histoplasmosis syndrome of both eyes 2000    left eye greater than right eye-S/P laser treatment in [place];  Avastin ; Dr Ross Levo    Sleep disorder     chronic     Past Surgical History:   Procedure Laterality Date    CARPAL TUNNEL RELEASE Right 07/02/2008    COLONOSCOPY  2015    COLONOSCOPY  2011    COLONOSCOPY  12/07/2018    polyps 4, call next week for pathology results    COLONOSCOPY N/A 12/7/2018    COLONOSCOPY POLYPECTOMY SNARE/COLD BIOPSY performed by Moi Gabriel MD at [address] [facility] N/A 7/8/2021    COLONOSCOPY POLYPECTOMY SNARE/COLD BIOPSY performed by Moi Gabriel MD at [address] [facility] ARTHROSCOPY Right 9/2015    Dr Ambrose Lee      L4-L5    [address]  12/2012    left temple: [facility]; [address]  11/2012    Melanoma; scalp     Social History:  Social/Functional History  Lives With: Spouse  Type of Home: House  Home Layout: One level  Home Access: Stairs to enter without rails  Entrance Stairs - Number of Steps: 1  Bathroom Shower/Tub: Walk-in shower, Shower chair without back  Bathroom Toilet: Handicap height  Bathroom Equipment: Grab bars in shower, Grab bars around toilet  Bathroom Accessibility: Walker accessible  Home Equipment: Walker, rolling, Reacher, Grab bars (transport w/c was recommended last rehab
Occupational Therapy    Physical Rehabilitation: OCCUPATIONAL THERAPY     [x] daily progress note       [] discharge       Patient Name:  Juana Love   :  1945 MRN: [id number]  Room:  [address] [place] [address] Date of Admission: 2023  Rehabilitation Diagnosis:   Drug-induced polyneuropathy [G62.0]  Chemotherapy-induced neuropathy ([address] [G62.0, T45.1X5A]       Date 2023       Day of ARU Week:  4   Time IN/OUT 1000/1200   Individual Tx Minutes 120   Group Tx Minutes    Co-Treat Minutes    Concurrent Tx Minutes    TOTAL Tx Time Mins 120   Variance Time    Variance Time []   Refusal due to:     []   Medical hold/reason:    []   Illness   []   Off Unit for test/procedure  []   Extra time needed to complete task  []   Therapeutic need  []   Other (specify):   Restrictions Restrictions/Precautions: General Precautions, Fall Risk (L inattention)         Communication with other providers: [x]   OK to see per nursing:     []   Spoke with team member regarding:      Subjective observations and cognitive status: Pt in semi-bang's upon entrance, resting with eyes closed. Pt pleasant and agreeable to session and taking a full shower. Pt constantly asking to lay down during session to rest requiring encouragement for participation throughout. Pain level/location:    /10       Location: Pt states no pain   Discharge recommendations  Anticipated discharge date:  23  Destination: []home alone   []home alone w assist prn   [x] home w/ family    [] Continuous supervision       []SNF    [] Assisted living     [] Other:   Continued therapy: []HHC OT  [x]OUTPATIENT  OT   [] No Further OT  Equipment needs: No equipment. ADLs:    UB/LB Bathing: Shower/Bathe Self  Assistance Needed: Supervision or touching assistance  Comment: Pt completed full shower seated on shower bench for majority. Pt requires verbal cues for initiation, continuation and for thoroughness.  30 secs into being in the shower, Pt shut the water off
Occupational Therapy    Physical Rehabilitation: OCCUPATIONAL THERAPY     [x] daily progress note       [] discharge       Patient Name:  Raudel Webb   :  1945 MRN: [id number]  Room:  [address] [place] [address] Date of Admission: 2023  Rehabilitation Diagnosis:   Drug-induced polyneuropathy [G62.0]  Chemotherapy-induced neuropathy ([address] [G62.0, T45.1X5A]       Date 2023       Day of ARU Week:  3   Time IN//930   Individual Tx Minutes 60   Group Tx Minutes    Co-Treat Minutes    Concurrent Tx Minutes    TOTAL Tx Time Mins 60   Variance Time    Variance Time []   Refusal due to:     []   Medical hold/reason:    []   Illness   []   Off Unit for test/procedure  []   Extra time needed to complete task  []   Therapeutic need  []   Other (specify):   Restrictions Restrictions/Precautions: General Precautions, Fall Risk (L inattention)         Communication with other providers: [x]   OK to see per nursing:     []   Spoke with team member regarding:      Subjective observations and cognitive status: Pt in semi-bang talking with wife upon therapist arrival. Pt pleasant and agreeable to therapy. Pt persistent on not showering or changing clothes. States [place] later. \"     Pain level/location:    /10       Location: No pain per pt. Discharge recommendations  Anticipated discharge date:  TBD  Destination: []home alone   []home alone w assist prn   [] home w/ family    [] Continuous supervision       []SNF    [] Assisted living     [] Other:   Continued therapy: []HHC OT  []OUTPATIENT  OT   [] No Further OT  Equipment needs: TBD       ADLs:    Eating: Eating  Assistance Needed: Setup or clean-up assistance  Comment: Pt reports needing assistance opening orange juice.   CARE Score: 5  Discharge Goal: Independent       Oral Hygiene: Oral Hygiene  Assistance Needed: Supervision or touching assistance  Comment: VC for problem solving and increase time required to complete task/use zip lock bag  CARE Score: 4  Discharge
Physical Rehabilitation: OCCUPATIONAL THERAPY     [x] daily progress note       [] discharge       Patient Name:  Ida Warren   :  1945 MRN: [id number]  Room:  [address] [place] [address] Date of Admission: 2023  Rehabilitation Diagnosis:   Drug-induced polyneuropathy [G62.0]  Chemotherapy-induced neuropathy ([address] [G62.0, T45.1X5A]       Date 6/3/2023       Day of ARU Week:  5   Time IN/-1030   Individual Tx Minutes 60   Group Tx Minutes    Co-Treat Minutes    Concurrent Tx Minutes    TOTAL Tx Time Mins 60   Variance Time    Variance Time []   Refusal due to:     []   Medical hold/reason:    []   Illness   []   Off Unit for test/procedure  []   Extra time needed to complete task  []   Therapeutic need  []   Other (specify):   Restrictions Restrictions/Precautions: General Precautions, Fall Risk (L inattention)         Communication with other providers: [x]   OK to see per nursing:     []   Spoke with team member regarding:      Subjective observations and cognitive status: Pt received from GARY Medina who was present in room c pt seen seated EOB attempting to lie down. Following encouragement and redirection pt agreeable to participate in OT tx. Pain level/location:    /10       Location: denies pain   Discharge recommendations  Anticipated discharge date:  23  Destination: []home alone   []home alone w assist prn   [x] home w/ family    [] Continuous supervision       []SNF    [] Assisted living     [] Other:   Continued therapy: []HHC OT  [x]OUTPATIENT  OT   [] No Further OT  Equipment needs: No equipment.      Toileting:   denied need       Bed Mobility:           [x]   Pt received out of bed       Transfers:    Sit--> Stand:  CGA  Stand --> Sit:   CGA c cues for reaching behind for chair  Stand-Pivot:   CGA c cues for sequencing and safety  Other:    Assistive device required for transfer:   FWW      Functional Mobility:    Assistance:  Pt engaged in fxl mob throughout ARU hallways, gym and
Physical Therapy    [x] daily progress note       [] discharge       Patient Name:  Elba Stuart   :  1945 MRN: [id number]  Room:  [address] [place] [address] Date of Admission: 2023  Rehabilitation Diagnosis:   Drug-induced polyneuropathy [G62.0]  Chemotherapy-induced neuropathy ([address] [G62.0, T45.1X5A]       Date 2023       Day of ARU Week:  3   Time IN/OUT 0646-3172   Individual Tx Minutes 60   Group Tx Minutes    Co-Treat Minutes    Concurrent Tx Minutes    TOTAL Tx Time Mins 60   Variance Time    Variance Time []   Refusal due to:     []   Medical hold/reason:    []   Illness   []   Off Unit for test/procedure  []   Extra time needed to complete task  []   Therapeutic need  []   Other (specify):   Restrictions Restrictions/Precautions  Restrictions/Precautions: General Precautions, Fall Risk (L inattention)      Communication with other providers: [x]   OK to see per nursing:     [x]   Spoke with team member regarding:   Don MARCUS regarding session content   Subjective observations and cognitive status: Pt sitting on commode when I approached mcdaniel. Pt requesting to lay down.      Pain level/location:  0  /10       Location:    Discharge recommendations  Anticipated discharge date:  tbd  Destination: []home alone   []home alone with assist PRN     [] home w/ family      [] Continuous supervision  []SNF    [] Assisted living     [] Other:   Continued therapy: []HHC PT  []OUTPATIENT  PT   [] No Further PT  Equipment needs: tbd         Bed Mobility:           [x]   Pt received out of bed       Transfers:    Sit--> Stand:  sba  Stand --> Sit:   sba/cga  Chair-->Bed/Bed --> Chair:   cga  Car Transfers:  cg/sba  Toilet Transfer (if applicable): cga  Other:    Assistive device required for transfer:   fww    Gait:    Distance:  7 ft ,153', 237', 135'  Assistance:  cga to occasional sba  Device:  fww  Gait Quality:  slow inconsistent steps with pt requiring vc for left step length    Wheelchair Propulsion:  Distance:
Physical Therapy  [x] daily progress note       [] discharge       Patient Name:  Bijal Colon   :  1945 MRN: [id number]  Room:  [address] [place] [address] Date of Admission: 2023  Rehabilitation Diagnosis:   Drug-induced polyneuropathy [G62.0]  Chemotherapy-induced neuropathy ([address] [G62.0, T45.1X5A]       Date 6/3/2023       Day of ARU Week:  5   Time IN/-828 7835-1200   Individual Tx Minutes 120   Group Tx Minutes    Co-Treat Minutes    Concurrent Tx Minutes    TOTAL Tx Time Mins 120   Variance Time    Variance Time []   Refusal due to:     []   Medical hold/reason:    []   Illness   []   Off Unit for test/procedure  []   Extra time needed to complete task  []   Therapeutic need  []   Other (specify):   Restrictions Restrictions/Precautions  Restrictions/Precautions: General Precautions, Fall Risk (L inattention)      Communication with other providers: []   OK to see per nursing:     []   Spoke with team member regarding:      Subjective observations and cognitive status: Patient requiring constant cues for redirection. Participated well in therapy session.       Pain level/location: 0/10       Location: N/A   Discharge recommendations  Anticipated discharge date:  TBD  Destination: []home alone   []home with assist PRN     [] home with continuous supervision     []SNF      [] Assisted living     [] Other:   Continued therapy: []HHC PT  []OUTPATIENT  PT   [] No Further PT  Equipment needs: TBD     Bed Mobility:           []   Pt received out of bed   Rolling R/L:  SBA with use of siderails   Scooting:  SBA  Supine --> Sit:  SBA with use of siderails  Sit --> Supine:  SBA   Bed features used:    [x] HOB elevated      [x] Bed rail                                    [] No     Transfers:    Sit--> Stand:  Sit<>stand from EOB with CGA, Sit<>stand transfers from w/c with CGA  Stand --> Sit:   CGA  Stand-Pivot:   CGA  Toilet Transfer: CGA/min A with cues for technique with pivoting B LE's   Toileting: CGA/SBA  Car
Physical Therapy  [x] daily progress note       [] discharge       Patient Name:  Rosa Liu   :  1945 MRN: [id number]  Room:  [address] [place] [address] Date of Admission: 2023  Rehabilitation Diagnosis:   Drug-induced polyneuropathy [G62.0]  Chemotherapy-induced neuropathy ([address] [G62.0, T45.1X5A]       Date 2023       Day of ARU Week:  4   Time IN//930   Individual Tx Minutes 60   Group Tx Minutes    Co-Treat Minutes    Concurrent Tx Minutes    TOTAL Tx Time Mins 60   Variance Time    Variance Time []   Refusal due to:     []   Medical hold/reason:    []   Illness   []   Off Unit for test/procedure  []   Extra time needed to complete task  []   Therapeutic need  []   Other (specify):   Restrictions Restrictions/Precautions  Restrictions/Precautions: General Precautions, Fall Risk (L inattention)      Interdisciplinary communication [x]   Cleared for therapy per nursing     []   RN notified about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

## 2023-06-10 ENCOUNTER — HOSPITAL ENCOUNTER (OUTPATIENT)
Dept: PHYSICAL THERAPY | Age: 78
Setting detail: THERAPIES SERIES
Discharge: HOME | End: 2023-06-10
Payer: MEDICARE

## 2023-06-10 PROCEDURE — 97530 THERAPEUTIC ACTIVITIES: CPT

## 2023-06-10 PROCEDURE — 97116 GAIT TRAINING THERAPY: CPT

## 2023-06-10 PROCEDURE — 97110 THERAPEUTIC EXERCISES: CPT

## 2023-06-10 NOTE — PROGRESS NOTES
3+/5  Abd 3/5  Add 2+/5        Assessment:        Patient has completed 3 physical therapy sessions from 5/10/23 - 6/10/23. There was a break in OP therapy d/t direct re-admission to ARU/in-patient rehab for a more intense therapy experience. Now has returned to OP for continuation of therapy services. TUG has improved to 36 sec (was 38 sec.)  5 sit><stand has improved significantly from 80 sec to 22.2 sec, w/o use of hands from 19\" seat height. Pt has difficulty w/vision and motor planning/processing/initiation of movement or activity on command. Requires repeated cues. Goals have been partially met, with room for improvement as well as at least fair rehab potential.  Patient will benefit from continued physical therapy for strengthening, neuro re-ed, motor planning, balance/gait/proprioception to improve safety and function w/adls and general mobility in the home and community. Goal Status:  [] Achieved [x] Partially Achieved  [x] Not Achieved     Patient goals: improve strength, balance and endurance  Long Term Goals  Time Frame for Long Term Goals : 8 weeks  Long Term Goal 1: I in home program. - UNMET 6/10/23  Long Term Goal 2: complete 6 min walk test - UNMET 6/10/23  Long Term Goal 3: sit/stand with minimal use of UE's - INCONSISTENTLY MET 6/10/23  Long Term Goal 4: TUG improve to less than 13. 5.sec - IMPROVED/UNMET 6/10/23        Frequency/Duration:  # Days per week: [] 1 day # Weeks: [] 1 week [x] 4 weeks [] 8 weeks     [x] 2 days   [] 2 weeks [] 5 weeks [] 10 weeks     [] 3 days   [] 3 weeks [] 6 weeks [] 12 weeks       Rehab Potential: [] Excellent [x] Good [] Fair  [] Poor         Patient Status: [x] Continue per initial plan of Care through 7/10.23     [] Patient now discharged     [] Additional visits requested, Please re-certify for additional visits:         If we are requesting more visits, we fully anticipate the patient's condition is expected to improve within the treatment timeframe we

## 2023-06-10 NOTE — PROGRESS NOTES
3/5  Add 2+/5        Assessment:        Patient has completed 3 physical therapy sessions from 5/10/23 - 6/10/23. There was a break in OP therapy d/t direct re-admission to ARU/in-patient rehab for a more intense therapy experience. Now has returned to OP for continuation of therapy services. TUG has improved to 36 sec (was 38 sec.)  5 sit><stand has improved significantly from 80 sec to 22.2 sec, w/o use of hands from 19\" seat height. Pt has difficulty w/vision and motor planning/processing/initiation of movement or activity on command. Requires repeated cues. Goals have been partially met, with room for improvement as well as at least fair rehab potential.  Patient will benefit from continued physical therapy for strengthening, neuro re-ed, motor planning, balance/gait/proprioception to improve safety and function w/adls and general mobility in the home and community. Goal Status:  [] Achieved [x] Partially Achieved  [x] Not Achieved     Patient goals: improve strength, balance and endurance  Long Term Goals  Time Frame for Long Term Goals : 8 weeks  Long Term Goal 1: I in home program. - UNMET 6/10/23  Long Term Goal 2: complete 6 min walk test - UNMET 6/10/23  Long Term Goal 3: sit/stand with minimal use of UE's - INCONSISTENTLY MET 6/10/23  Long Term Goal 4: TUG improve to less than 13. 5.sec - IMPROVED/UNMET 6/10/23        Frequency/Duration:  # Days per week: [] 1 day # Weeks: [] 1 week [x] 4 weeks [] 8 weeks     [x] 2 days   [] 2 weeks [] 5 weeks [] 10 weeks     [] 3 days   [] 3 weeks [] 6 weeks [] 12 weeks       Rehab Potential: [] Excellent [x] Good [] Fair  [] Poor         Patient Status: [x] Continue per initial plan of Care through 7/10.23     [] Patient now discharged     [] Additional visits requested, Please re-certify for additional visits:         If we are requesting more visits, we fully anticipate the patient's condition is expected to improve within the treatment timeframe we are

## 2023-06-10 NOTE — FLOWSHEET NOTE
Outpatient Physical Therapy  [facility]           [x] Phone: [phone]   Fax: [phone]  [place]           [] Phone: [phone]   Fax: [phone]        Physical Therapy Daily Treatment Note  Date:  6/10/2023    Patient Name:  Dharmesh Oro    :  1945  MRN: [id number]  Restrictions/Precautions: Restrictions/Precautions: Fall Risk; General Precautions; Seizure (Left side neglect.)   Diagnosis:   Malignant neoplasm of brain, unspecified [C71.9]  Hemiplegia, unspecified affecting left nondominant side [G81.94]    Date of Injury/Surgery: 2022  Treatment Diagnosis:   debility, dec strength, balance  Insurance/Certification information:  Medicare  Referring Physician:  Brock Trivedi MD     PCP: NABEEL Dc NP  Next Doctor Visit:  Unknown  Plan of care signed (Y/N):  n  Outcome Measure: TU sec SELF 5x sit/stand: 80sec  6/10/23:  TUG 36 sec,   6/10/23:   Five S><S 22.2 sec, no hands full stand    Visit# / total visits:  3/  Pain level: 0/10     Goals:          Patient goals: improve strength, balance and endurance  Long Term Goals  Time Frame for Long Term Goals : 8 weeks  Long Term Goal 1: I in home program. - UNMET 6/10/23  Long Term Goal 2: complete 6 min walk test - UNMET 6/10/23  Long Term Goal 3: sit/stand with minimal use of UE's - INCONSISTENTLY MET 6/10/23  Long Term Goal 4: TUG improve to less than 13. 5.sec - IMPROVED/UNMET 6/10/23         Summary of Evaluation:     hx of glioblastoma w surgery 2022. Pt presents to PT with complaints of debility/fatigue weakness, and decreased balance. He had onset of L sided weakness following surgery for histoblastoma 2022 with new onset of weakness and slurred speach 3/15/23 with trip to ED and hospital admission. D/C from ARU 3/28/23 and recent d/c from home care PT. He tests deficient with functional mobility tasks, balance testing, functional strength.  Gait is with short shuffling steps, primarily with a step to

## 2023-06-20 ENCOUNTER — HOSPITAL ENCOUNTER (OUTPATIENT)
Dept: PHYSICAL THERAPY | Age: 78
Setting detail: THERAPIES SERIES
Discharge: HOME | End: 2023-06-20

## 2023-06-20 PROCEDURE — 97530 THERAPEUTIC ACTIVITIES: CPT

## 2023-06-20 PROCEDURE — 97110 THERAPEUTIC EXERCISES: CPT

## 2023-06-20 PROCEDURE — 97116 GAIT TRAINING THERAPY: CPT

## 2023-06-20 NOTE — FLOWSHEET NOTE
Outpatient Physical Therapy  [facility]           [x] Phone: [phone]   Fax: [phone]  [facility]           [] Phone: [phone]   Fax: [phone]        Physical Therapy Daily Treatment Note  Date:  2023    Patient Name:  Jaylyn Lopez  \"Valdo\" :  1945  MRN: [id number]  Restrictions/Precautions: Restrictions/Precautions: Fall Risk; General Precautions; Seizure (Left side neglect.)   Diagnosis:   Malignant neoplasm of brain, unspecified [C71.9]  Hemiplegia, unspecified affecting left nondominant side [G81.94]    Date of Injury/Surgery: 2022  Treatment Diagnosis:   debility, dec strength, balance  Insurance/Certification information:  Medicare KX mod  Referring Physician:  Faby Covington MD     PCP: NABEEL Tobin NP  Next Doctor Visit:  Unknown  Plan of care signed (Y/N):  Yes  Outcome Measure: TU sec SELF 5x sit/stand: 80sec  6/10/23:  TUG 36 sec,   6/10/23:   Five S><S 22.2 sec, no hands full stand    Visit# / total visits:  /8  Pain level: 0/10     Goals:          Patient goals: improve strength, balance and endurance  Long Term Goals  Time Frame for Long Term Goals : 8 weeks  Long Term Goal 1: I in home program. - UNMET 6/10/23  Long Term Goal 2: complete 6 min walk test - UNMET 6/10/23  Long Term Goal 3: sit/stand with minimal use of UE's - INCONSISTENTLY MET 6/10/23  Long Term Goal 4: TUG improve to less than 13. 5.sec - IMPROVED/UNMET 6/10/23         Summary of Evaluation:     hx of glioblastoma w surgery 2022. Pt presents to PT with complaints of debility/fatigue weakness, and decreased balance. He had onset of L sided weakness following surgery for histoblastoma 2022 with new onset of weakness and slurred speach 3/15/23 with trip to ED and hospital admission. D/C from ARU 3/28/23 and recent d/c from home care PT. He tests deficient with functional mobility tasks, balance testing, functional strength.  Gait is with short shuffling steps, primarily

## 2023-06-22 ENCOUNTER — HOSPITAL ENCOUNTER (OUTPATIENT)
Dept: PHYSICAL THERAPY | Age: 78
Setting detail: THERAPIES SERIES
Discharge: HOME | End: 2023-06-22

## 2023-06-22 PROCEDURE — 97112 NEUROMUSCULAR REEDUCATION: CPT

## 2023-06-22 PROCEDURE — 97110 THERAPEUTIC EXERCISES: CPT

## 2023-06-22 PROCEDURE — 97530 THERAPEUTIC ACTIVITIES: CPT

## 2023-06-22 NOTE — FLOWSHEET NOTE
Pt demonstrated fair tolerance to tx with need for encouragement and cues throughout tx. Frequent rest breaks due to c/o fatigue today. Festinations when turning while walking. Improved posture and positioning in walker. Pt would continue to benefit from skilled therapy interventions to address remaining impairments, improve mobility and strength and progress toward goal completion while reducing risk for re-injury or further decline. End pain = 0/10     Pt presents to PT with complaints of debility/fatigue weakness, and decreased balance. He had onset of L sided weakness following surgery for histoblastoma 8/2022 with new onset of weakness and slurred speach 3/15/23 with trip to ED and hospital admission. D/C from ARU 3/28/23 and recent d/c from home care PT. He tests deficient with functional mobility tasks, balance testing, functional strength. Gait is with short shuffling steps, primarily with a step to pattern using w FWW. Transfers are with heavy use of UE's and difficutly maintaing fwd weight shift. Standing tolerance is limited to less than 2 minutes today.        Plan for Next Session: Balance/proprioception, core and LE strengthening       Time In / Time Out:  1035 pt late/1117    Timed Code/Total Treatment Minutes: 42/42   KX MODIFIERS      Next Progress Note due:  Visit 20 or 7/10/23      Plan of Care Interventions:  [x] Therapeutic Exercise  [] Modalities:  [x] Therapeutic Activity     [] Ultrasound  [] Estim  [x] Gait Training      [] Cervical Traction [] Lumbar Traction  [x] Neuromuscular Re-education    [] Cold/hotpack [] Iontophoresis   [x] Instruction in HEP      [] Vasopneumatic   [] Dry Needling    [x] Manual Therapy               [] Aquatic Therapy              Electronically signed by:  Irvin Villavicencio PT, CLT 6/22/2023, 10:38 AM

## 2023-06-25 ENCOUNTER — HOSPITAL ENCOUNTER (EMERGENCY)
Age: 78
Discharge: HOME | End: 2023-06-25
Attending: EMERGENCY MEDICINE
Payer: MEDICARE

## 2023-06-25 ENCOUNTER — APPOINTMENT (OUTPATIENT)
Dept: CT IMAGING | Age: 78
End: 2023-06-25
Payer: MEDICARE

## 2023-06-25 VITALS
SYSTOLIC BLOOD PRESSURE: 154 MMHG | WEIGHT: 245 LBS | BODY MASS INDEX: 33.18 KG/M2 | HEART RATE: 60 BPM | HEIGHT: 72 IN | RESPIRATION RATE: 14 BRPM | OXYGEN SATURATION: 97 % | DIASTOLIC BLOOD PRESSURE: 86 MMHG | TEMPERATURE: 97.5 F
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
## 2023-06-25 LAB
ALBUMIN SERPL-MCNC: 4.1 GM/DL (ref 3.4–5)
ALP BLD-CCNC: 52 IU/L (ref 40–129)
ALT SERPL-CCNC: 23 U/L (ref 10–40)
ANION GAP SERPL CALCULATED.3IONS-SCNC: 13 MMOL/L (ref 4–16)
AST SERPL-CCNC: 23 IU/L (ref 15–37)
BASOPHILS ABSOLUTE: 0 K/CU MM
BASOPHILS RELATIVE PERCENT: 0.3 % (ref 0–1)
BILIRUB SERPL-MCNC: 0.5 MG/DL (ref 0–1)
BUN SERPL-MCNC: 20 MG/DL (ref 6–23)
CALCIUM SERPL-MCNC: 8.8 MG/DL (ref 8.3–10.6)
CHLORIDE BLD-SCNC: 95 MMOL/L (ref 99–110)
CO2: 24 MMOL/L (ref 21–32)
CREAT SERPL-MCNC: 0.9 MG/DL (ref 0.9–1.3)
DIFFERENTIAL TYPE: ABNORMAL
EOSINOPHILS ABSOLUTE: 0 K/CU MM
EOSINOPHILS RELATIVE PERCENT: 0.5 % (ref 0–3)
GFR SERPL CREATININE-BSD FRML MDRD: >60 ML/MIN/1.73M2
GLUCOSE SERPL-MCNC: 95 MG/DL (ref 70–99)
HCT VFR BLD CALC: 39.5 % (ref 42–52)
HEMOGLOBIN: 13.8 GM/DL (ref 13.5–18)
IMMATURE NEUTROPHIL %: 0.6 % (ref 0–0.43)
LYMPHOCYTES ABSOLUTE: 0.3 K/CU MM
LYMPHOCYTES RELATIVE PERCENT: 5.3 % (ref 24–44)
MAGNESIUM: 1.9 MG/DL (ref 1.8–2.4)
MCH RBC QN AUTO: 34.8 PG (ref 27–31)
MCHC RBC AUTO-ENTMCNC: 34.9 % (ref 32–36)
MCV RBC AUTO: 99.5 FL (ref 78–100)
MONOCYTES ABSOLUTE: 0.6 K/CU MM
MONOCYTES RELATIVE PERCENT: 9.2 % (ref 0–4)
NUCLEATED RBC %: 0 %
PDW BLD-RTO: 11.7 % (ref 11.7–14.9)
PLATELET # BLD: 130 K/CU MM (ref 140–440)
PMV BLD AUTO: 9.5 FL (ref 7.5–11.1)
POTASSIUM SERPL-SCNC: 4.9 MMOL/L (ref 3.5–5.1)
RBC # BLD: 3.97 M/CU MM (ref 4.6–6.2)
SEGMENTED NEUTROPHILS ABSOLUTE COUNT: 5.4 K/CU MM
SEGMENTED NEUTROPHILS RELATIVE PERCENT: 84.1 % (ref 36–66)
SODIUM BLD-SCNC: 132 MMOL/L (ref 135–145)
TOTAL IMMATURE NEUTOROPHIL: 0.04 K/CU MM
TOTAL NUCLEATED RBC: 0 K/CU MM
TOTAL PROTEIN: 6.6 GM/DL (ref 6.4–8.2)
WBC # BLD: 6.4 K/CU MM (ref 4–10.5)

## 2023-06-25 PROCEDURE — 93005 ELECTROCARDIOGRAM TRACING: CPT | Performed by: EMERGENCY MEDICINE

## 2023-06-25 PROCEDURE — 85025 COMPLETE CBC W/AUTO DIFF WBC: CPT

## 2023-06-25 PROCEDURE — 70450 CT HEAD/BRAIN W/O DYE: CPT

## 2023-06-25 PROCEDURE — 6370000000 HC RX 637 (ALT 250 FOR IP): Performed by: EMERGENCY MEDICINE

## 2023-06-25 PROCEDURE — 99284 EMERGENCY DEPT VISIT MOD MDM: CPT

## 2023-06-25 PROCEDURE — 83735 ASSAY OF MAGNESIUM: CPT

## 2023-06-25 PROCEDURE — 80053 COMPREHEN METABOLIC PANEL: CPT

## 2023-06-25 RX ORDER — ACETAMINOPHEN 325 MG/1
650 TABLET ORAL ONCE
Status: COMPLETED | OUTPATIENT
Start: 2023-06-25 | End: 2023-06-25

## 2023-06-25 RX ADMIN — ACETAMINOPHEN 650 MG: 325 TABLET ORAL at 15:22

## 2023-06-25 ASSESSMENT — PAIN SCALES - GENERAL
PAINLEVEL_OUTOF10: 0
PAINLEVEL_OUTOF10: 6

## 2023-06-27 ENCOUNTER — HOSPITAL ENCOUNTER (OUTPATIENT)
Dept: PHYSICAL THERAPY | Age: 78
Discharge: HOME | End: 2023-06-27

## 2023-06-28 LAB
EKG ATRIAL RATE: 69 BPM
EKG DIAGNOSIS: NORMAL
EKG P AXIS: 45 DEGREES
EKG P-R INTERVAL: 150 MS
EKG Q-T INTERVAL: 394 MS
EKG QRS DURATION: 90 MS
EKG QTC CALCULATION (BAZETT): 422 MS
EKG R AXIS: 13 DEGREES
EKG T AXIS: 49 DEGREES
EKG VENTRICULAR RATE: 69 BPM

## 2023-06-28 PROCEDURE — 93010 ELECTROCARDIOGRAM REPORT: CPT | Performed by: INTERNAL MEDICINE

## (undated) DEVICE — KENDALL 500 SERIES DIAPHORETIC FOAM MONITORING ELECTRODE - TEAR DROP SHAPE ( 30/PK): Brand: KENDALL

## (undated) DEVICE — TUBING, SUCTION, 3/16" X 6', STRAIGHT: Brand: MEDLINE

## (undated) DEVICE — ACUSNARE POLYPECTOMY SNARE: Brand: ACUSNARE

## (undated) DEVICE — FORCEPS BX L240CM JAW DIA2.8MM L CAP W/ NDL MIC MESH TOOTH

## (undated) DEVICE — JELLY LUBRICATING 3 GM BACTERIOSTATIC

## (undated) DEVICE — LINER SUCT CANSTR 1500CC SEMI RIG W/ POR HYDROPHOBIC SHUT

## (undated) DEVICE — LINE SAMP O2 6.5FT W/FEMALE CONN F/ADULT CAPNOLINE PLUS

## (undated) DEVICE — BW-412T DISP COMBO CLEANING BRUSH: Brand: SINGLE USE COMBINATION CLEANING BRUSH

## (undated) DEVICE — SNARE VASC L240CM LOOP W10MM SHTH DIA2.4MM RND STIFF CLD

## (undated) DEVICE — Z INACTIVE CONVERTED USE 2418596 CONTAINER SPEC 40ML 10% NEUT BUFF FRMLN CLR POLYPR PREFIL

## (undated) DEVICE — THE TORRENT IRRIGATION SCOPE CONNECTOR IS USED WITH THE TORRENT IRRIGATION TUBING TO PROVIDE IRRIGATION FLUIDS SUCH AS STERILE WATER DURING GASTROINTESTINAL ENDOSCOPIC PROCEDURES WHEN USED IN CONJUNCTION WITH AN IRRIGATION PUMP (OR ELECTROSURGICAL UNIT).: Brand: TORRENT